# Patient Record
Sex: FEMALE | Race: WHITE | NOT HISPANIC OR LATINO | Employment: OTHER | URBAN - METROPOLITAN AREA
[De-identification: names, ages, dates, MRNs, and addresses within clinical notes are randomized per-mention and may not be internally consistent; named-entity substitution may affect disease eponyms.]

---

## 2017-02-13 ENCOUNTER — OFFICE VISIT (OUTPATIENT)
Dept: CARDIOLOGY | Facility: CLINIC | Age: 57
End: 2017-02-13

## 2017-02-13 VITALS
HEIGHT: 69 IN | SYSTOLIC BLOOD PRESSURE: 164 MMHG | HEART RATE: 84 BPM | BODY MASS INDEX: 34.36 KG/M2 | WEIGHT: 232 LBS | DIASTOLIC BLOOD PRESSURE: 98 MMHG | RESPIRATION RATE: 18 BRPM

## 2017-02-13 DIAGNOSIS — E78.5 HYPERLIPIDEMIA, UNSPECIFIED HYPERLIPIDEMIA TYPE: ICD-10-CM

## 2017-02-13 DIAGNOSIS — I10 ESSENTIAL HYPERTENSION: ICD-10-CM

## 2017-02-13 DIAGNOSIS — I48.21 PERMANENT ATRIAL FIBRILLATION (HCC): Primary | ICD-10-CM

## 2017-02-13 PROCEDURE — 99204 OFFICE O/P NEW MOD 45 MIN: CPT | Performed by: INTERNAL MEDICINE

## 2017-02-13 PROCEDURE — 93000 ELECTROCARDIOGRAM COMPLETE: CPT | Performed by: INTERNAL MEDICINE

## 2017-02-13 RX ORDER — ATORVASTATIN CALCIUM 20 MG/1
20 TABLET, FILM COATED ORAL DAILY
COMMUNITY
End: 2017-11-07 | Stop reason: SDUPTHER

## 2017-02-13 RX ORDER — METOPROLOL SUCCINATE 50 MG/1
50 TABLET, EXTENDED RELEASE ORAL NIGHTLY
Qty: 90 TABLET | Refills: 3 | Status: SHIPPED | OUTPATIENT
Start: 2017-02-13 | End: 2017-05-12

## 2017-02-13 RX ORDER — FLUTICASONE PROPIONATE 50 MCG
2 SPRAY, SUSPENSION (ML) NASAL DAILY
COMMUNITY
End: 2017-07-03 | Stop reason: SDUPTHER

## 2017-02-13 RX ORDER — ERGOCALCIFEROL 1.25 MG/1
50000 CAPSULE ORAL WEEKLY
COMMUNITY
End: 2018-12-10

## 2017-02-13 RX ORDER — FEXOFENADINE HCL 180 MG/1
180 TABLET ORAL DAILY
COMMUNITY
End: 2017-11-20 | Stop reason: ALTCHOICE

## 2017-02-13 NOTE — PROGRESS NOTES
Date of Office Visit: 2017  Encounter Provider: Mayte Vu MD  Place of Service: [facility] CARDIOLOGY  Patient Name: Claudia Gage  :1960      Patient ID:  Claudia Gage is a 56 y.o. female is here for atrial fibrillation.           History of Present Illness     She is here today to establish care.      She has a history of hypertension and hyperlipidemia and is on treatment for this.  She also has obstructive sleep apnea for which she uses a CPAP.  She has recurrent ear infections in the right ear but the other issue she has is some paroxysmal atrial fibrillation.  She is in atrial fibrillation today.  She is not on any blood thinner.      She used to follow with Dr. Gonzalez.  She had an echocardiogram done there in 2015, which showed ejection fraction of 55% with mild left atrial dilation and normal cardiac follow-up.      She is not exercising and she has gained weight.  She has followed with Dr. Briceno for her medical treatment.      Normally, her blood pressure is better controlled than it is today.  She has had kind of a rough day and so it is high.      She is noted to have a left thyroid nodule.      She has never had heart failure, diabetes, coronary artery disease, myocardial infarction, or rheumatic fever.  She does not smoke.  She is  and works part-time and has four children who are grown.  She has alcohol about once or twice per year.  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      Her mother  with coronary artery disease and she had hypertension and diabetes mellitus.          Past Medical History   Diagnosis Date   • Hyperlipidemia    • Hypertension    • Left thyroid nodule 2016   • Sleep apnea          Past Surgical History   Procedure Laterality Date   •  section     • Appendectomy         Current Outpatient Prescriptions on File Prior to Visit   Medication Sig Dispense Refill   • aspirin 81 MG EC tablet  Take 81 mg by mouth daily.     • losartan-hydrochlorothiazide (HYZAAR) 50-12.5 MG per tablet Take 1 tablet by mouth daily. 90 tablet 2   • sotalol (BETAPACE) 120 MG tablet Take 120 mg by mouth 2 (Two) Times a Day.     • [DISCONTINUED] atorvastatin (LIPITOR) 10 MG tablet      • [DISCONTINUED] ciprofloxacin (CIPRO) 500 MG tablet Take 1 tablet by mouth 2 (two) times a day. 20 tablet 0   • [DISCONTINUED] lisinopril (PRINIVIL,ZESTRIL) 20 MG tablet Take 1 tablet by mouth Daily. 90 tablet 1     Current Facility-Administered Medications on File Prior to Visit   Medication Dose Route Frequency Provider Last Rate Last Dose   • [DISCONTINUED] neomycin-polymyxin-hydrocortisone (CORTISPORIN) 1 % otic solution solution 4 drop  4 drop Both Ears 4x Daily Patrice Briceno MD           Social History     Social History   • Marital status:      Spouse name: N/A   • Number of children: N/A   • Years of education: N/A     Occupational History   • Not on file.     Social History Main Topics   • Smoking status: Never Smoker   • Smokeless tobacco: Never Used   • Alcohol use Yes      Comment: 1-2 yearly   • Drug use: No   • Sexual activity: No     Other Topics Concern   • Not on file     Social History Narrative           Review of Systems   Constitution: Negative.   HENT: Negative for congestion and headaches.    Eyes: Negative for vision loss in left eye and vision loss in right eye.   Respiratory: Negative.  Negative for cough, hemoptysis, shortness of breath, sleep disturbances due to breathing, snoring, sputum production and wheezing.    Endocrine: Negative.    Hematologic/Lymphatic: Negative.    Skin: Negative for poor wound healing and rash.   Musculoskeletal: Negative for falls, gout, muscle cramps and myalgias.   Gastrointestinal: Negative for abdominal pain, diarrhea, dysphagia, hematemesis, melena, nausea and vomiting.   Neurological: Negative for excessive daytime sleepiness, dizziness, light-headedness, loss of balance,  "seizures and vertigo.   Psychiatric/Behavioral: Negative for depression and substance abuse. The patient is not nervous/anxious.        Procedures    ECG 12 Lead  Date/Time: 2/13/2017 3:01 PM  Performed by: RASHAUN JOYCE  Authorized by: RASHAUN JOYCE   Comparison: not compared with previous ECG   Rhythm: atrial fibrillation  Clinical impression: abnormal ECG               Objective:      Vitals:    02/13/17 1435 02/13/17 1438   BP: 158/100 164/98   BP Location: Right arm Left arm   Pulse: 84    Resp: 18    Weight: 232 lb (105 kg)    Height: 68.5\" (174 cm)      Body mass index is 34.76 kg/(m^2).    Physical Exam   Constitutional: She is oriented to person, place, and time. She appears well-developed and well-nourished. No distress.   HENT:   Head: Normocephalic and atraumatic.   Eyes: Conjunctivae are normal. No scleral icterus.   Neck: Neck supple. No JVD present. Carotid bruit is not present. No thyromegaly present.   Cardiovascular: Normal rate, S1 normal, S2 normal, normal heart sounds and intact distal pulses.  An irregularly irregular rhythm present.  No extrasystoles are present. PMI is not displaced.  Exam reveals no gallop.    No murmur heard.  Pulses:       Carotid pulses are 2+ on the right side, and 2+ on the left side.       Radial pulses are 2+ on the right side, and 2+ on the left side.        Dorsalis pedis pulses are 2+ on the right side, and 2+ on the left side.        Posterior tibial pulses are 2+ on the right side, and 2+ on the left side.   Pulmonary/Chest: Effort normal and breath sounds normal. No respiratory distress. She has no wheezes. She has no rhonchi. She has no rales. She exhibits no tenderness.   Abdominal: Soft. Bowel sounds are normal. She exhibits no distension, no abdominal bruit and no mass. There is no tenderness.   Musculoskeletal: She exhibits no edema or deformity.   Lymphadenopathy:     She has no cervical adenopathy.   Neurological: She is alert and oriented " to person, place, and time. No cranial nerve deficit.   Skin: Skin is warm and dry. No rash noted. She is not diaphoretic. No cyanosis. No pallor. Nails show no clubbing.   Psychiatric: She has a normal mood and affect. Judgment normal.   Vitals reviewed.      Lab Review:       Assessment:      Diagnosis Plan   1. Essential hypertension     2. Hyperlipidemia, unspecified hyperlipidemia type        1. Paroxysmal atrial fibrillation.  She is currently in atrial fibrillation.  We will start her on Xarelto 20 mg daily, and discontinue the Sotalol and start her on Toprol XL 50 mg q hs.     2. Hypertension.  We will use Toprol for her hypertension.     3. Hyperlipidemia.   On Atorvastatin.,     4. Obstructive sleep apnea. On CPAP.     5. Recurrent right ear infections.      6. Obesity and sedentary lifestyle.     7. Family history of coronary artery of coronary artery disease in her mother.            Plan:       See back in 3 months.    Atrial Fibrillation and Atrial Flutter  Assessment  • The patient has paroxysmal atrial fibrillation  • This is non-valvular in etiology  • The patient's CHADS2-VASc score is 2  • A [id number] score of 2 or more is considered a high risk for a thromboembolic event  • Rivaroxaban prescribed    Plan  • Continue in atrial fibrillation with rate control  • Add rivaroxaban for antithrombotic therapy  • Continue beta blocker for rate control

## 2017-05-04 DIAGNOSIS — I10 ESSENTIAL HYPERTENSION: ICD-10-CM

## 2017-05-04 RX ORDER — LOSARTAN POTASSIUM AND HYDROCHLOROTHIAZIDE 12.5; 5 MG/1; MG/1
TABLET ORAL
Qty: 90 TABLET | Refills: 1 | Status: SHIPPED | OUTPATIENT
Start: 2017-05-04 | End: 2017-10-11 | Stop reason: SDUPTHER

## 2017-05-12 ENCOUNTER — OFFICE VISIT (OUTPATIENT)
Dept: CARDIOLOGY | Facility: CLINIC | Age: 57
End: 2017-05-12

## 2017-05-12 VITALS
SYSTOLIC BLOOD PRESSURE: 140 MMHG | BODY MASS INDEX: 34.56 KG/M2 | HEART RATE: 117 BPM | HEIGHT: 68 IN | DIASTOLIC BLOOD PRESSURE: 80 MMHG | WEIGHT: 228 LBS

## 2017-05-12 DIAGNOSIS — E78.5 HYPERLIPIDEMIA, UNSPECIFIED HYPERLIPIDEMIA TYPE: ICD-10-CM

## 2017-05-12 DIAGNOSIS — I48.21 PERMANENT ATRIAL FIBRILLATION (HCC): Primary | ICD-10-CM

## 2017-05-12 DIAGNOSIS — I10 ESSENTIAL HYPERTENSION: ICD-10-CM

## 2017-05-12 PROCEDURE — 99214 OFFICE O/P EST MOD 30 MIN: CPT | Performed by: INTERNAL MEDICINE

## 2017-05-12 PROCEDURE — 93000 ELECTROCARDIOGRAM COMPLETE: CPT | Performed by: INTERNAL MEDICINE

## 2017-05-12 RX ORDER — METOPROLOL SUCCINATE 100 MG/1
100 TABLET, EXTENDED RELEASE ORAL NIGHTLY
Qty: 90 TABLET | Refills: 3 | Status: SHIPPED | OUTPATIENT
Start: 2017-05-12 | End: 2017-11-20

## 2017-06-20 RX ORDER — CLOBETASOL PROPIONATE 0.5 MG/G
CREAM TOPICAL
Qty: 60 G | Refills: 1 | Status: SHIPPED | OUTPATIENT
Start: 2017-06-20 | End: 2020-03-10 | Stop reason: SDUPTHER

## 2017-07-03 RX ORDER — FLUTICASONE PROPIONATE 50 MCG
2 SPRAY, SUSPENSION (ML) NASAL DAILY
Qty: 1 EACH | Refills: 11 | Status: SHIPPED | OUTPATIENT
Start: 2017-07-03

## 2017-09-06 ENCOUNTER — OFFICE VISIT (OUTPATIENT)
Dept: INTERNAL MEDICINE | Facility: CLINIC | Age: 57
End: 2017-09-06

## 2017-09-06 VITALS
HEIGHT: 68 IN | DIASTOLIC BLOOD PRESSURE: 80 MMHG | RESPIRATION RATE: 18 BRPM | OXYGEN SATURATION: 99 % | WEIGHT: 228 LBS | SYSTOLIC BLOOD PRESSURE: 150 MMHG | BODY MASS INDEX: 34.56 KG/M2 | HEART RATE: 74 BPM

## 2017-09-06 DIAGNOSIS — I10 ESSENTIAL HYPERTENSION: ICD-10-CM

## 2017-09-06 DIAGNOSIS — H92.02 OTALGIA, LEFT EAR: Primary | ICD-10-CM

## 2017-09-06 PROBLEM — H60.8X3 CHRONIC REACTIVE OTITIS EXTERNA OF BOTH EARS: Status: ACTIVE | Noted: 2017-09-06

## 2017-09-06 PROCEDURE — 99213 OFFICE O/P EST LOW 20 MIN: CPT | Performed by: INTERNAL MEDICINE

## 2017-09-06 RX ORDER — NEOMYCIN SULFATE, POLYMYXIN B SULFATE, HYDROCORTISONE 3.5; 10000; 1 MG/ML; [USP'U]/ML; MG/ML
4 SOLUTION/ DROPS AURICULAR (OTIC) 4 TIMES DAILY
Status: DISCONTINUED | OUTPATIENT
Start: 2017-09-06 | End: 2019-10-15

## 2017-09-06 NOTE — PROGRESS NOTES
Subjective   Claudia Gage is a 57 y.o. female.     History of Present Illness   56 yo female with L otalgia.  She has HTN, HL, Atrial fibrilllation.  She was previously on sotalol.  Now on toprol xl. HTN well contributed. Now seeing Dr. Vu.  She is doing well from CV standpoint.  At home BP is running good. She has not had well exam in a while.        Here for acute Left ear pain starting yesterday after shampoo and conditioner.  She denies fever, but having white debris.  No fever or chills.      No current cp.  Overdue for her routine care.     The following portions of the patient's history were reviewed and updated as appropriate: allergies, current medications, past family history, past medical history, past social history, past surgical history and problem list.    Review of Systems   Constitutional: Negative for appetite change, fatigue, fever and unexpected weight change.   HENT: Positive for ear discharge and ear pain. Negative for congestion, facial swelling, sinus pressure and trouble swallowing.    Respiratory: Negative.  Negative for cough and chest tightness.    Cardiovascular: Negative.  Negative for chest pain, palpitations and leg swelling.   Gastrointestinal: Negative.  Negative for constipation and diarrhea.   Genitourinary: Negative.  Negative for dysuria, frequency, hematuria, pelvic pain and vaginal discharge.   Musculoskeletal: Negative.    Skin: Negative.    Neurological: Negative for dizziness, light-headedness and headaches.   Hematological: Negative.    Psychiatric/Behavioral: Negative.    All other systems reviewed and are negative.      Objective   Physical Exam   Constitutional: She is oriented to person, place, and time. She appears well-developed and well-nourished.   HENT:   Head: Normocephalic and atraumatic.   Right Ear: External ear normal.   Left Ear: External ear normal.   Eyes: EOM are normal. Pupils are equal, round, and reactive to light.   Neck: Normal range of motion.  Neck supple. No JVD present. No tracheal deviation present. No thyromegaly present.   Cardiovascular: Normal rate, regular rhythm and normal heart sounds.  Exam reveals no friction rub.    No murmur heard.  Pulmonary/Chest: Effort normal and breath sounds normal.   Lymphadenopathy:     She has no cervical adenopathy.   Neurological: She is alert and oriented to person, place, and time.   Skin: Skin is warm and dry.   Psychiatric: She has a normal mood and affect. Her behavior is normal.   Vitals reviewed.      Assessment/Plan   Claudia was seen today for ear fullness.    Diagnoses and all orders for this visit:    Otalgia, left ear  -     neomycin-polymyxin-hydrocortisone (CORTISPORIN) 1 % otic solution solution 4 drop; Administer 4 drops into both ears 4 (Four) Times a Day.    Essential hypertension          Wear plugs in the shower  Reiterate eustachian tube dysfunction  Call or return if not better, may need ENT input if continues.

## 2017-09-07 NOTE — TELEPHONE ENCOUNTER
Rx was not sent at time of visit.  Called and left on Tami's VM.  LM on patient's VM advising.    ----- Message from Alycia Schaefer sent at 9/7/2017  3:54 PM EDT -----  Regarding: REFILL  Contact: [phone]  Pt was seen 9/6/17 and was prescribed cortisporin 1% otic solution 4 drops but pharmacy has not received script. Please fax to [facility] [phone]

## 2017-10-11 DIAGNOSIS — I10 ESSENTIAL HYPERTENSION: ICD-10-CM

## 2017-10-11 RX ORDER — LOSARTAN POTASSIUM AND HYDROCHLOROTHIAZIDE 12.5; 5 MG/1; MG/1
TABLET ORAL
Qty: 90 TABLET | Refills: 0 | Status: SHIPPED | OUTPATIENT
Start: 2017-10-11 | End: 2018-01-03 | Stop reason: SDUPTHER

## 2017-11-07 ENCOUNTER — OFFICE VISIT (OUTPATIENT)
Dept: INTERNAL MEDICINE | Facility: CLINIC | Age: 57
End: 2017-11-07

## 2017-11-07 VITALS
OXYGEN SATURATION: 97 % | SYSTOLIC BLOOD PRESSURE: 140 MMHG | BODY MASS INDEX: 34.4 KG/M2 | RESPIRATION RATE: 18 BRPM | HEART RATE: 80 BPM | HEIGHT: 68 IN | DIASTOLIC BLOOD PRESSURE: 78 MMHG | WEIGHT: 227 LBS

## 2017-11-07 DIAGNOSIS — E78.5 HYPERLIPIDEMIA, UNSPECIFIED HYPERLIPIDEMIA TYPE: ICD-10-CM

## 2017-11-07 DIAGNOSIS — Z00.00 ROUTINE ADULT HEALTH MAINTENANCE: Primary | ICD-10-CM

## 2017-11-07 LAB
ALBUMIN SERPL-MCNC: 4.2 G/DL (ref 3.5–5.2)
ALBUMIN/GLOB SERPL: 1.3 G/DL
ALP SERPL-CCNC: 69 U/L (ref 40–129)
ALT SERPL-CCNC: 20 U/L (ref 5–33)
AST SERPL-CCNC: 18 U/L (ref 5–32)
BASOPHILS # BLD AUTO: 0.04 10*3/MM3 (ref 0–0.2)
BASOPHILS NFR BLD AUTO: 0.7 % (ref 0–2)
BILIRUB BLD-MCNC: NEGATIVE MG/DL
BILIRUB SERPL-MCNC: 0.9 MG/DL (ref 0.2–1.2)
BUN SERPL-MCNC: 16 MG/DL (ref 6–20)
BUN/CREAT SERPL: 20.8 (ref 7–25)
CALCIUM SERPL-MCNC: 9.6 MG/DL (ref 8.6–10.5)
CHLORIDE SERPL-SCNC: 100 MMOL/L (ref 98–107)
CHOLEST SERPL-MCNC: 216 MG/DL (ref 0–200)
CLARITY, POC: CLEAR
CO2 SERPL-SCNC: 28.7 MMOL/L (ref 22–29)
COLOR UR: YELLOW
CREAT SERPL-MCNC: 0.77 MG/DL (ref 0.57–1)
EOSINOPHIL # BLD AUTO: 0.11 10*3/MM3 (ref 0.1–0.3)
EOSINOPHIL NFR BLD AUTO: 2 % (ref 0–4)
ERYTHROCYTE [DISTWIDTH] IN BLOOD BY AUTOMATED COUNT: 13.2 % (ref 11.5–14.5)
GFR SERPLBLD CREATININE-BSD FMLA CKD-EPI: 77 ML/MIN/1.73
GFR SERPLBLD CREATININE-BSD FMLA CKD-EPI: 94 ML/MIN/1.73
GLOBULIN SER CALC-MCNC: 3.2 GM/DL
GLUCOSE SERPL-MCNC: 92 MG/DL (ref 65–99)
GLUCOSE UR STRIP-MCNC: NEGATIVE MG/DL
HBA1C MFR BLD: 6.3 % (ref 4.8–5.6)
HCT VFR BLD AUTO: 41.7 % (ref 37–47)
HDLC SERPL-MCNC: 47 MG/DL (ref 40–60)
HGB BLD-MCNC: 13.7 G/DL (ref 12–16)
IMM GRANULOCYTES # BLD: 0.01 10*3/MM3 (ref 0–0.03)
IMM GRANULOCYTES NFR BLD: 0.2 % (ref 0–0.5)
KETONES UR QL: NEGATIVE
LDLC SERPL CALC-MCNC: 143 MG/DL (ref 0–100)
LDLC/HDLC SERPL: 3.04 {RATIO}
LEUKOCYTE EST, POC: NEGATIVE
LYMPHOCYTES # BLD AUTO: 1.82 10*3/MM3 (ref 0.6–4.8)
LYMPHOCYTES NFR BLD AUTO: 32.6 % (ref 20–45)
MCH RBC QN AUTO: 28.4 PG (ref 27–31)
MCHC RBC AUTO-ENTMCNC: 32.9 G/DL (ref 31–37)
MCV RBC AUTO: 86.5 FL (ref 81–99)
MONOCYTES # BLD AUTO: 0.52 10*3/MM3 (ref 0–1)
MONOCYTES NFR BLD AUTO: 9.3 % (ref 3–8)
NEUTROPHILS # BLD AUTO: 3.09 10*3/MM3 (ref 1.5–8.3)
NEUTROPHILS NFR BLD AUTO: 55.2 % (ref 45–70)
NITRITE UR-MCNC: NEGATIVE MG/ML
NRBC BLD AUTO-RTO: 0 /100 WBC (ref 0–0)
PH UR: 5.5 [PH] (ref 5–8)
PLATELET # BLD AUTO: 302 10*3/MM3 (ref 140–500)
POTASSIUM SERPL-SCNC: 4.1 MMOL/L (ref 3.5–5.2)
PROT SERPL-MCNC: 7.4 G/DL (ref 6–8.5)
PROT UR STRIP-MCNC: NEGATIVE MG/DL
RBC # BLD AUTO: 4.82 10*6/MM3 (ref 4.2–5.4)
RBC # UR STRIP: ABNORMAL /UL
SODIUM SERPL-SCNC: 139 MMOL/L (ref 136–145)
SP GR UR: 1.01 (ref 1–1.03)
TRIGL SERPL-MCNC: 130 MG/DL (ref 0–150)
TSH SERPL DL<=0.005 MIU/L-ACNC: 1.31 MIU/ML (ref 0.27–4.2)
UROBILINOGEN UR QL: NORMAL
VLDLC SERPL CALC-MCNC: 26 MG/DL (ref 7–27)
WBC # BLD AUTO: 5.59 10*3/MM3 (ref 4.8–10.8)

## 2017-11-07 PROCEDURE — 99396 PREV VISIT EST AGE 40-64: CPT | Performed by: INTERNAL MEDICINE

## 2017-11-07 PROCEDURE — 81003 URINALYSIS AUTO W/O SCOPE: CPT | Performed by: INTERNAL MEDICINE

## 2017-11-07 RX ORDER — ATORVASTATIN CALCIUM 20 MG/1
20 TABLET, FILM COATED ORAL DAILY
Qty: 90 TABLET | Refills: 2 | Status: SHIPPED | OUTPATIENT
Start: 2017-11-07 | End: 2018-10-02 | Stop reason: SDUPTHER

## 2017-11-07 NOTE — PROGRESS NOTES
Melchor Gage is a 57 y.o. female.     History of Present Illness   58yo female with ppmhx HTN, HL, AF, SIMIN  She is doing well on xarelto, but twisted her ankle and is noticing it is going down.  Fell a few days ago, did have lateral swelling that is getting better.Immediately bore weight.  Using ice and elevation with some relief.    She is rate controlled, seeing dr. Vu, doing well with no cp or palpitations.  She denies any dizziness.   She is taking care of grandchildren.  She just got back from vacation, was an active time, but did not limit her diet. Twisted her ankle at the [place]. Able to bear weight.     The following portions of the patient's history were reviewed and updated as appropriate: allergies, current medications, past family history, past medical history, past social history, past surgical history and problem list.    Review of Systems   HENT: Negative.    Respiratory: Negative.    Cardiovascular: Negative.    Gastrointestinal: Negative.    Genitourinary: Negative.    Neurological: Negative.    Hematological: Negative.    Psychiatric/Behavioral: Negative.    All other systems reviewed and are negative.      Objective   Physical Exam   Constitutional: She is oriented to person, place, and time. She appears well-developed and well-nourished.   HENT:   Head: Normocephalic and atraumatic.   Right Ear: External ear normal.   Left Ear: External ear normal.   Eyes: EOM are normal. Pupils are equal, round, and reactive to light.   Neck: Normal range of motion. Neck supple. No thyromegaly present.   Cardiovascular: Normal rate, regular rhythm and normal heart sounds.    No murmur heard.  Pulmonary/Chest: Effort normal and breath sounds normal.   Abdominal: She exhibits no distension.   Neurological: She is alert and oriented to person, place, and time.   Skin: Skin is warm and dry.   Psychiatric: She has a normal mood and affect. Her behavior is normal.   Vitals  reviewed.      Assessment/Plan      Claudia was seen today for annual exam.    Diagnoses and all orders for this visit:    Routine adult health maintenance  -     Comprehensive metabolic panel  -     Lipid Panel With LDL/HDL Ratio  -     Hemoglobin A1c  -     TSH  -     CBC w AUTO Differential    Hyperlipidemia, unspecified hyperlipidemia type  -     atorvastatin (LIPITOR) 20 MG tablet; Take 1 tablet by mouth Daily.      Work on low carb lifestyle.  She is motivated to lose weight  BP is well controlled. COntinue her losartan hctz and toprol.  AF rate controlled, anticoagulated on xarelto, tolerating well.   HL - stable on lipitor, without side effects.   HM  Flu shot done at pharmacy  She is going to make her mammogram appt.  Thyroid nodule, US in 9/17 - will make appt with Dr. Amin after first of the year.    Reassurance about R ankle sprain, doubt fracture.  Ice and elevate, wrap when out walking. Avoid flip flops, wear supportive shoes.

## 2017-11-17 ENCOUNTER — TELEPHONE (OUTPATIENT)
Dept: INTERNAL MEDICINE | Facility: CLINIC | Age: 57
End: 2017-11-17

## 2017-11-17 NOTE — TELEPHONE ENCOUNTER
----- Message from Patrice Briceno MD sent at 11/15/2017  4:14 PM EST -----  Labs are great, ldl still not at goal, will increase her statin if not better in 6 months. Want her to think about HMR diet at milestone or on line.  Diabetes number controlled.    Pt given lab results info.dg

## 2017-11-20 ENCOUNTER — OFFICE VISIT (OUTPATIENT)
Dept: CARDIOLOGY | Facility: CLINIC | Age: 57
End: 2017-11-20

## 2017-11-20 VITALS
HEIGHT: 68 IN | SYSTOLIC BLOOD PRESSURE: 120 MMHG | DIASTOLIC BLOOD PRESSURE: 70 MMHG | BODY MASS INDEX: 34.56 KG/M2 | HEART RATE: 94 BPM | WEIGHT: 228 LBS

## 2017-11-20 DIAGNOSIS — I10 ESSENTIAL HYPERTENSION: Primary | ICD-10-CM

## 2017-11-20 DIAGNOSIS — E78.5 HYPERLIPIDEMIA, UNSPECIFIED HYPERLIPIDEMIA TYPE: ICD-10-CM

## 2017-11-20 DIAGNOSIS — I48.21 PERMANENT ATRIAL FIBRILLATION (HCC): ICD-10-CM

## 2017-11-20 PROCEDURE — 93000 ELECTROCARDIOGRAM COMPLETE: CPT | Performed by: INTERNAL MEDICINE

## 2017-11-20 PROCEDURE — 99214 OFFICE O/P EST MOD 30 MIN: CPT | Performed by: INTERNAL MEDICINE

## 2017-11-20 RX ORDER — METOPROLOL SUCCINATE 100 MG/1
150 TABLET, EXTENDED RELEASE ORAL NIGHTLY
Qty: 135 TABLET | Refills: 3 | Status: SHIPPED | OUTPATIENT
Start: 2017-11-20 | End: 2018-12-10

## 2017-11-20 NOTE — PROGRESS NOTES
Date of Office Visit: 2017  Encounter Provider: Mayte Vu MD  Place of Service: [facility] CARDIOLOGY  Patient Name: Claudia Gage  :1960      Patient ID:  Claudia Gage is a 57 y.o. female is here for  followup for hypertension and atrial fibrillation.         History of Present Illness    She has a history of hypertension and hyperlipidemia and is on treatment for this. She also has obstructive sleep apnea for which she uses a CPAP. She has recurrent ear infections in the right ear but the other issue she has is some paroxysmal atrial fibrillation. She is in atrial fibrillation today. She is not on any blood thinner.       She used to follow with Dr. Gonzalez. She had an echocardiogram done there in 2015, which showed ejection fraction of 55% with mild left atrial dilation and normal cardiac valves.       She is not exercising and she has gained weight. She has followed with Dr. Briceno for her medical treatment.       She is noted to have a left thyroid nodule.       She does not smoke. She is  and works part-time and has four children who are grown. She has alcohol about once or twice per year. 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.       Her mother  with coronary artery disease and she had hypertension and diabetes mellitus.       She is doing well.  Her heart rate is high here today.  She did a lot of hiking this summer.  She's had no tachycardia that she can feel but she does have dyspnea with exertion.  She's had no dizziness or syncope.  She has no chest pain or pressure.  She is tolerating her medications.  She's taking them as directed.    Past Medical History:   Diagnosis Date   • Hyperlipidemia    • Hypertension    • Left thyroid nodule 2016   • Otalgia, left ear 2017   • Routine adult health maintenance 2017   • Sleep apnea          Past Surgical History:   Procedure Laterality Date   • APPENDECTOMY     •   SECTION         Current Outpatient Prescriptions on File Prior to Visit   Medication Sig Dispense Refill   • atorvastatin (LIPITOR) 20 MG tablet Take 1 tablet by mouth Daily. 90 tablet 2   • clobetasol (TEMOVATE) 0.05 % cream APPLY TWICE DAILY AS DIRECTED 60 g 1   • fluticasone (FLONASE) 50 MCG/ACT nasal spray 2 sprays into each nostril Daily. 1 each 11   • losartan-hydrochlorothiazide (HYZAAR) 50-12.5 MG per tablet TAKE ONE TABLET BY MOUTH DAILY 90 tablet 0   • metoprolol succinate XL (TOPROL-XL) 100 MG 24 hr tablet Take 1 tablet by mouth Every Night. 90 tablet 3   • Nutritional Supplements (ESTROVEN PO) Take  by mouth Daily.     • rivaroxaban (XARELTO) 20 MG tablet Take 1 tablet by mouth Daily. 90 tablet 3   • vitamin D (ERGOCALCIFEROL) 46550 UNITS capsule capsule Take 50,000 Units by mouth 1 (One) Time Per Week.     • [DISCONTINUED] aspirin 81 MG EC tablet Take 81 mg by mouth daily.     • [DISCONTINUED] fexofenadine (ALLEGRA) 180 MG tablet Take 180 mg by mouth Daily.       Current Facility-Administered Medications on File Prior to Visit   Medication Dose Route Frequency Provider Last Rate Last Dose   • neomycin-polymyxin-hydrocortisone (CORTISPORIN) 1 % otic solution solution 4 drop  4 drop Both Ears 4x Daily Patrice Briceno MD           Social History     Social History   • Marital status:      Spouse name: N/A   • Number of children: N/A   • Years of education: N/A     Occupational History   • Not on file.     Social History Main Topics   • Smoking status: Never Smoker   • Smokeless tobacco: Never Used   • Alcohol use Yes      Comment: 1-2 yearly   • Drug use: No   • Sexual activity: No     Other Topics Concern   • Not on file     Social History Narrative           Review of Systems   Constitution: Negative.   HENT: Negative for congestion.    Eyes: Negative for vision loss in left eye and vision loss in right eye.   Respiratory: Negative.  Negative for cough, hemoptysis, shortness of breath,  "sleep disturbances due to breathing, snoring, sputum production and wheezing.    Endocrine: Negative.    Hematologic/Lymphatic: Negative.    Skin: Negative for poor wound healing and rash.   Musculoskeletal: Negative for falls, gout, muscle cramps and myalgias.   Gastrointestinal: Negative for abdominal pain, diarrhea, dysphagia, hematemesis, melena, nausea and vomiting.   Neurological: Negative for excessive daytime sleepiness, dizziness, headaches, light-headedness, loss of balance, seizures and vertigo.   Psychiatric/Behavioral: Negative for depression and substance abuse. The patient is not nervous/anxious.        Procedures    ECG 12 Lead  Date/Time: 11/20/2017 2:21 PM  Performed by: RASHAUN JOYCE  Authorized by: RASHAUN JOYCE   Comparison: compared with previous ECG   Similar to previous ECG  Rhythm: atrial fibrillation  T flattening: all  Clinical impression: abnormal ECG               Objective:      Vitals:    11/20/17 1415   BP: 120/70   BP Location: Right arm   Patient Position: Sitting   Pulse: 94   Weight: 228 lb (103 kg)   Height: 68\" (172.7 cm)     Body mass index is 34.67 kg/(m^2).    Physical Exam   Constitutional: She is oriented to person, place, and time. She appears well-developed and well-nourished. No distress.   HENT:   Head: Normocephalic and atraumatic.   Eyes: Conjunctivae are normal. No scleral icterus.   Neck: Neck supple. No JVD present. Carotid bruit is not present. No thyromegaly present.   Cardiovascular: Normal rate, S1 normal, S2 normal, normal heart sounds and intact distal pulses.  An irregularly irregular rhythm present.  No extrasystoles are present. PMI is not displaced.  Exam reveals no gallop.    No murmur heard.  Pulses:       Carotid pulses are 2+ on the right side, and 2+ on the left side.       Radial pulses are 2+ on the right side, and 2+ on the left side.        Dorsalis pedis pulses are 2+ on the right side, and 2+ on the left side.        Posterior " tibial pulses are 2+ on the right side, and 2+ on the left side.   Pulmonary/Chest: Effort normal and breath sounds normal. No respiratory distress. She has no wheezes. She has no rhonchi. She has no rales. She exhibits no tenderness.   Abdominal: Soft. Bowel sounds are normal. She exhibits no distension, no abdominal bruit and no mass. There is no tenderness.   Musculoskeletal: She exhibits no edema or deformity.   Lymphadenopathy:     She has no cervical adenopathy.   Neurological: She is alert and oriented to person, place, and time. No cranial nerve deficit.   Skin: Skin is warm and dry. No rash noted. She is not diaphoretic. No cyanosis. No pallor. Nails show no clubbing.   Psychiatric: She has a normal mood and affect. Judgment normal.   Vitals reviewed.      Lab Review:       Assessment:      Diagnosis Plan   1. Essential hypertension     2. Permanent atrial fibrillation     3. Hyperlipidemia, unspecified hyperlipidemia type       1. Paroxysmal atrial fibrillation, on Xarelto 20 mg daily, and increase Toprol  mg q hs.   2. Hypertension. We will use Toprol for her hypertension.   3. Hyperlipidemia. On Atorvastatin.,   4. Obstructive sleep apnea. On CPAP.   5. Recurrent right ear infections.   6. Obesity and sedentary lifestyle.   7. Family history of coronary artery of coronary artery disease in her mother.          Plan:       See back in 6 months. Heart rate high so increase meds.    Atrial Fibrillation and Atrial Flutter  Assessment  • The patient has permanent atrial fibrillation  • This is non-valvular in etiology  • The patient's CHADS2-VASc score is 2  • A [id number] score of 2 or more is considered a high risk for a thromboembolic event  • Rivaroxaban prescribed    Plan  • Continue in atrial fibrillation with rate control  • Continue rivaroxaban for antithrombotic therapy, bleeding issues discussed  • Continue beta blocker for rate control

## 2018-01-03 DIAGNOSIS — I10 ESSENTIAL HYPERTENSION: ICD-10-CM

## 2018-01-04 RX ORDER — LOSARTAN POTASSIUM AND HYDROCHLOROTHIAZIDE 12.5; 5 MG/1; MG/1
TABLET ORAL
Qty: 90 TABLET | Refills: 0 | Status: SHIPPED | OUTPATIENT
Start: 2018-01-04 | End: 2018-04-02 | Stop reason: SDUPTHER

## 2018-01-04 RX ORDER — RIVAROXABAN 20 MG/1
TABLET, FILM COATED ORAL
Qty: 90 TABLET | Refills: 2 | Status: SHIPPED | OUTPATIENT
Start: 2018-01-04 | End: 2018-10-17 | Stop reason: SDUPTHER

## 2018-01-09 ENCOUNTER — OFFICE VISIT (OUTPATIENT)
Dept: SLEEP MEDICINE | Facility: HOSPITAL | Age: 58
End: 2018-01-09
Attending: INTERNAL MEDICINE

## 2018-01-09 VITALS
HEART RATE: 73 BPM | WEIGHT: 230 LBS | DIASTOLIC BLOOD PRESSURE: 76 MMHG | SYSTOLIC BLOOD PRESSURE: 124 MMHG | HEIGHT: 67 IN | BODY MASS INDEX: 36.1 KG/M2

## 2018-01-09 DIAGNOSIS — G47.33 OBSTRUCTIVE SLEEP APNEA: Primary | ICD-10-CM

## 2018-01-09 DIAGNOSIS — E66.9 OBESITY (BMI 30-39.9): ICD-10-CM

## 2018-01-09 PROCEDURE — G0463 HOSPITAL OUTPT CLINIC VISIT: HCPCS

## 2018-01-09 NOTE — PROGRESS NOTES
[facility] SLEEP MEDICINE  [address]  3rd Floor  [facility] [address]  [phone]    PCP: Patrice Briceno MD    Reason for visit:  Sleep disorders: SIMIN    Claudia Gage is a 57 y.o.female who was seen in the Sleep Disorders Center today. Last seen Jan 2016. She had mild SIMIN (RDI criteria only) and setup with CPAP. She was doing well with it on last check. She feels the device is very helpful. Her compliance is 94%. Using nasal mask, changes regularly. Sleeps from 12MN to 8AM. Wakes up rested in AM. No EDS. ESS is 9.    Current Medications:    Current Outpatient Prescriptions:   •  atorvastatin (LIPITOR) 20 MG tablet, Take 1 tablet by mouth Daily., Disp: 90 tablet, Rfl: 2  •  clobetasol (TEMOVATE) 0.05 % cream, APPLY TWICE DAILY AS DIRECTED, Disp: 60 g, Rfl: 1  •  fluticasone (FLONASE) 50 MCG/ACT nasal spray, 2 sprays into each nostril Daily., Disp: 1 each, Rfl: 11  •  Loratadine (CLARITIN PO), Take 1 tablet by mouth Daily., Disp: , Rfl:   •  losartan-hydrochlorothiazide (HYZAAR) 50-12.5 MG per tablet, TAKE ONE TABLET BY MOUTH DAILY, Disp: 90 tablet, Rfl: 0  •  metoprolol succinate XL (TOPROL-XL) 100 MG 24 hr tablet, Take 1.5 tablets by mouth Every Night., Disp: 135 tablet, Rfl: 3  •  Nutritional Supplements (ESTROVEN PO), Take  by mouth Daily., Disp: , Rfl:   •  vitamin D (ERGOCALCIFEROL) 35024 UNITS capsule capsule, Take 50,000 Units by mouth 1 (One) Time Per Week., Disp: , Rfl:   •  XARELTO 20 MG tablet, TAKE 1 TABLET EVERY DAY, Disp: 90 tablet, Rfl: 2    Current Facility-Administered Medications:   •  neomycin-polymyxin-hydrocortisone (CORTISPORIN) 1 % otic solution solution 4 drop, 4 drop, Both Ears, 4x Daily, Patrice Briceno MD   also entered in Sleep Questionnaire    Patient  has a past medical history of Hyperlipidemia; Hypertension; Left thyroid nodule (9/13/2016); Otalgia, left ear (9/6/2017); Routine adult health maintenance (11/7/2017); and Sleep apnea.   I have reviewed the Past  "Medical History, Past Surgical History, Social History and Family History in EPIC and Sleep Questionnaire.    Pertinent Positive Review of Systems of nasal congestion, ear pain  Rest of Review of Systems was negative as recorded in Sleep Questionnaire.        Vital Signs: /76  Pulse 73  Ht 170.2 cm (67\")  Wt 104 kg (230 lb)  BMI 36.02 kg/m2        General: Alert. Cooperative. Well developed. No acute distress.             Head:  Normocephalic. Symmetrical. Atraumatic.              Nose: No septal deviation. No drainage.          Throat: No oral lesions. No thrush. Moist mucous membranes.    Tongue is normal and dentition is intact       Pharynx: Posterior pharyngeal pillars are narrow with Mallampati score of Class III     Chest Wall:  Normal shape. Symmetric expansion with respiration. No tenderness.             Neck:  Trachea midline.           Lungs:  Clear to auscultation bilaterally. No wheezes. No rhonchi. No rales. Respirations regular, even and unlabored.            Heart:  Regular rhythm and normal rate. Normal S1 and S2. No murmur.     Abdomen:  Soft, non-tender and non-distended. Normal bowel sounds. No masses.  Extremities:  Moves all extremities well. No edema.    Psychiatric: Normal mood and affect.    Study:  · 10/12/15 diagnostic  Diagnostic study from 10:38 p.m. to 5:55 a.m. Sleep efficiency was  poor at 63%. Per patient's request, Ambien 5 mg administered at lights out.  Total sleep time was 4.63 hours. Despite the Ambien, sleep efficiency was poor  and patient had a very prolonged awake after 4 a.m. Sleep distribution was  normal for sleep time at 4% stage I sleep, 48% stage II sleep, 28% slow-wave  sleep and 19% REM sleep. The apnea/hypopnea index 0.9 with RDI of 6.5. In 25  minutes of supine sleep RDI 21.6 and in 53 minutes of REM sleep, RDI 5.6.  Oxygen saturation remained above 90%. Arousal index 17 and patient had 40% time  snoring.     Testing:  · 90 day compliance is 94%.  Average " usage 6 hours.  AHI 3.5.  Average CPAP pressure is 9-11 cm.  Set auto CPAP pressure is 5-15 cm.    [facility] [employer]: "[employer]"    Impression:  1. Obstructive sleep apnea    2. Obesity (BMI 30-39.9)        Plan:  Patient has only mild obstructive sleep apnea by RDI criteria for study done in 2015.  Despite this she benefits greatly from the machine.  She has underlying atrial fibrillation.  It is therefore important for her to continue to use the same.  She is benefiting from regular usage and change his supplies regularly.    I reiterated the importance of effective treatment of obstructive sleep apnea with PAP machine.  Cardiovascular health risks of untreated sleep apnea were again reviewed.  Patient was asked to remain cautious if there is persistent hypersomnolence.    Change of PAP supplies regularly is important for effective use.  Change of cushion on the mask or plugs on nasal pillows along with disposable filters once every month and change of mask frame, tubing, headgear and Velcro straps every 6 months at the minimum was reiterated.    This patient is compliant with PAP machine and benefits from its use.  Apnea hypopneas index is corrected/improved.  Daytime hypersomnolence has resolved.     Patient will follow up in this clinic in 1 year    Thank you for allowing me to participate in your patient's care.    Honorio Tavares MD  [facility] SLEEP MEDICINE  [address] [place] [address]  3rd Floor  [facility] [address]  Dept: [phone]  Dept Fax: [phone]  Loc: [phone]    Part of this note may be an electronic transcription/translation of spoken language to printed text using the Dragon Dictation System.

## 2018-04-02 DIAGNOSIS — I10 ESSENTIAL HYPERTENSION: ICD-10-CM

## 2018-04-02 RX ORDER — LOSARTAN POTASSIUM AND HYDROCHLOROTHIAZIDE 12.5; 5 MG/1; MG/1
TABLET ORAL
Qty: 90 TABLET | Refills: 0 | Status: SHIPPED | OUTPATIENT
Start: 2018-04-02 | End: 2018-04-13 | Stop reason: SDUPTHER

## 2018-04-13 DIAGNOSIS — I10 ESSENTIAL HYPERTENSION: ICD-10-CM

## 2018-04-16 RX ORDER — LOSARTAN POTASSIUM AND HYDROCHLOROTHIAZIDE 12.5; 5 MG/1; MG/1
TABLET ORAL
Qty: 90 TABLET | Refills: 0 | Status: SHIPPED | OUTPATIENT
Start: 2018-04-16 | End: 2018-10-23 | Stop reason: SDUPTHER

## 2018-10-02 DIAGNOSIS — E78.5 HYPERLIPIDEMIA, UNSPECIFIED HYPERLIPIDEMIA TYPE: ICD-10-CM

## 2018-10-02 RX ORDER — ATORVASTATIN CALCIUM 20 MG/1
TABLET, FILM COATED ORAL
Qty: 90 TABLET | Refills: 0 | Status: SHIPPED | OUTPATIENT
Start: 2018-10-02 | End: 2018-12-30 | Stop reason: SDUPTHER

## 2018-10-17 RX ORDER — RIVAROXABAN 20 MG/1
TABLET, FILM COATED ORAL
Qty: 90 TABLET | Refills: 0 | Status: SHIPPED | OUTPATIENT
Start: 2018-10-17 | End: 2019-01-14 | Stop reason: SDUPTHER

## 2018-10-23 DIAGNOSIS — I10 ESSENTIAL HYPERTENSION: ICD-10-CM

## 2018-10-23 RX ORDER — LOSARTAN POTASSIUM AND HYDROCHLOROTHIAZIDE 12.5; 5 MG/1; MG/1
TABLET ORAL
Qty: 90 TABLET | Refills: 0 | Status: SHIPPED | OUTPATIENT
Start: 2018-10-23 | End: 2019-03-05 | Stop reason: SDUPTHER

## 2018-12-10 ENCOUNTER — OFFICE VISIT (OUTPATIENT)
Dept: CARDIOLOGY | Facility: CLINIC | Age: 58
End: 2018-12-10

## 2018-12-10 VITALS
BODY MASS INDEX: 35.04 KG/M2 | SYSTOLIC BLOOD PRESSURE: 112 MMHG | DIASTOLIC BLOOD PRESSURE: 70 MMHG | WEIGHT: 231.2 LBS | HEART RATE: 63 BPM | HEIGHT: 68 IN

## 2018-12-10 DIAGNOSIS — E78.5 HYPERLIPIDEMIA, UNSPECIFIED HYPERLIPIDEMIA TYPE: ICD-10-CM

## 2018-12-10 DIAGNOSIS — I48.0 PAROXYSMAL ATRIAL FIBRILLATION (HCC): Primary | ICD-10-CM

## 2018-12-10 DIAGNOSIS — I10 ESSENTIAL HYPERTENSION: ICD-10-CM

## 2018-12-10 PROCEDURE — 99214 OFFICE O/P EST MOD 30 MIN: CPT | Performed by: INTERNAL MEDICINE

## 2018-12-10 PROCEDURE — 93000 ELECTROCARDIOGRAM COMPLETE: CPT | Performed by: INTERNAL MEDICINE

## 2018-12-10 RX ORDER — METOPROLOL SUCCINATE 100 MG/1
150 TABLET, EXTENDED RELEASE ORAL NIGHTLY
Qty: 135 TABLET | Refills: 3 | Status: SHIPPED | OUTPATIENT
Start: 2018-12-10 | End: 2019-12-16 | Stop reason: SDUPTHER

## 2018-12-10 RX ORDER — ERGOCALCIFEROL 1.25 MG/1
50000 CAPSULE ORAL WEEKLY
Qty: 10 CAPSULE | Refills: 0 | COMMUNITY
Start: 2018-12-10 | End: 2020-03-13

## 2018-12-10 NOTE — PROGRESS NOTES
Date of Office Visit: 12/10/2018  Encounter Provider: Mayte Vu MD  Place of Service: [facility] CARDIOLOGY  Patient Name: Claudia Gage  :1960      Patient ID:  Claudia Gage is a 58 y.o. female is here for  followup for PAF.         History of Present Illness    She has a history of hypertension and hyperlipidemia and is on treatment for this. She also has obstructive sleep apnea for which she uses a CPAP. She has recurrent ear infections in the right ear but the other issue she has is some paroxysmal atrial fibrillation. She is in atrial fibrillation today. She is not on any blood thinner.       She used to follow with Dr. Gonzalez. She had an echocardiogram done there in 2015, which showed ejection fraction of 55% with mild left atrial dilation and normal cardiac valves.       She is not exercising and she has gained weight. She has followed with Dr. Briceno for her medical treatment.       She is noted to have a left thyroid nodule.       She does not smoke. She is  and works part-time and has four children who are grown. She has alcohol about once or twice per year. 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       Her mother  with coronary artery disease and she had hypertension and diabetes mellitus.     She is doing well.  She doesn't feel her heart racing or skipping.  She has no chest pain or pressure.  She has no exertional dyspnea.  She has not lost any weight.  She is taking her medications as directed.  Overall, things are stable.    Past Medical History:   Diagnosis Date   • Hyperlipidemia    • Hypertension    • Left thyroid nodule 2016   • Otalgia, left ear 2017   • Routine adult health maintenance 2017   • Sleep apnea          Past Surgical History:   Procedure Laterality Date   • APPENDECTOMY     •  SECTION         Current Outpatient Medications on File Prior to Visit   Medication Sig Dispense Refill   •  atorvastatin (LIPITOR) 20 MG tablet TAKE 1 TABLET BY MOUTH ONE TIME A DAY  90 tablet 0   • clobetasol (TEMOVATE) 0.05 % cream APPLY TWICE DAILY AS DIRECTED 60 g 1   • fluticasone (FLONASE) 50 MCG/ACT nasal spray 2 sprays into each nostril Daily. 1 each 11   • Loratadine (CLARITIN PO) Take 1 tablet by mouth Daily.     • losartan-hydrochlorothiazide (HYZAAR) 50-12.5 MG per tablet TAKE ONE TABLET BY MOUTH DAILY 90 tablet 0   • vitamin D (ERGOCALCIFEROL) 16676 units capsule capsule Take 1 capsule by mouth 1 (One) Time Per Week. 10 capsule 0   • XARELTO 20 MG tablet TAKE 1 TABLET EVERY DAY 90 tablet 0   • [DISCONTINUED] metoprolol succinate XL (TOPROL-XL) 100 MG 24 hr tablet Take 1.5 tablets by mouth Every Night. 135 tablet 3   • [DISCONTINUED] vitamin D (ERGOCALCIFEROL) 33811 UNITS capsule capsule Take 50,000 Units by mouth 1 (One) Time Per Week.     • [DISCONTINUED] Nutritional Supplements (ESTROVEN PO) Take  by mouth Daily.       Current Facility-Administered Medications on File Prior to Visit   Medication Dose Route Frequency Provider Last Rate Last Dose   • neomycin-polymyxin-hydrocortisone (CORTISPORIN) 1 % otic solution solution 4 drop  4 drop Both Ears 4x Daily Patrice Briceno MD           Social History     Socioeconomic History   • Marital status:      Spouse name: Not on file   • Number of children: Not on file   • Years of education: Not on file   • Highest education level: Not on file   Social Needs   • Financial resource strain: Not on file   • Food insecurity - worry: Not on file   • Food insecurity - inability: Not on file   • Transportation needs - medical: Not on file   • Transportation needs - non-medical: Not on file   Occupational History   • Not on file   Tobacco Use   • Smoking status: Never Smoker   • Smokeless tobacco: Never Used   Substance and Sexual Activity   • Alcohol use: Yes     Comment: 1-2 yearly   • Drug use: No   • Sexual activity: No   Other Topics Concern   • Not on file  "  Social History Narrative   • Not on file           Review of Systems   Constitution: Negative.   HENT: Negative for congestion.    Eyes: Negative for vision loss in left eye and vision loss in right eye.   Respiratory: Negative.  Negative for cough, hemoptysis, shortness of breath, sleep disturbances due to breathing, snoring, sputum production and wheezing.    Endocrine: Negative.    Hematologic/Lymphatic: Negative.    Skin: Negative for poor wound healing and rash.   Musculoskeletal: Negative for falls, gout, muscle cramps and myalgias.   Gastrointestinal: Negative for abdominal pain, diarrhea, dysphagia, hematemesis, melena, nausea and vomiting.   Neurological: Negative for excessive daytime sleepiness, dizziness, headaches, light-headedness, loss of balance, seizures and vertigo.   Psychiatric/Behavioral: Negative for depression and substance abuse. The patient is not nervous/anxious.        Procedures    ECG 12 Lead  Date/Time: 12/10/2018 2:20 PM  Performed by: Mayte Vu MD  Authorized by: Mayte Vu MD   Comparison: compared with previous ECG   Comparison to previous ECG: No a fib  Rhythm: sinus rhythm  Clinical impression: normal ECG                Objective:      Vitals:    12/10/18 1403   BP: 112/70   BP Location: Right arm   Patient Position: Sitting   Pulse: 63   Weight: 105 kg (231 lb 3.2 oz)   Height: 172.7 cm (68\")     Body mass index is 35.15 kg/m².    Physical Exam   Constitutional: She is oriented to person, place, and time. She appears well-developed and well-nourished. No distress.   HENT:   Head: Normocephalic and atraumatic.   Eyes: Conjunctivae are normal. No scleral icterus.   Neck: Neck supple. No JVD present. Carotid bruit is not present. No thyromegaly present.   Cardiovascular: Normal rate, regular rhythm, S1 normal, S2 normal, normal heart sounds and intact distal pulses.  No extrasystoles are present. PMI is not displaced. Exam reveals no gallop.   No murmur " heard.  Pulses:       Carotid pulses are 2+ on the right side, and 2+ on the left side.       Radial pulses are 2+ on the right side, and 2+ on the left side.        Dorsalis pedis pulses are 2+ on the right side, and 2+ on the left side.        Posterior tibial pulses are 2+ on the right side, and 2+ on the left side.   Pulmonary/Chest: Effort normal and breath sounds normal. No respiratory distress. She has no wheezes. She has no rhonchi. She has no rales. She exhibits no tenderness.   Abdominal: Soft. Bowel sounds are normal. She exhibits no distension, no abdominal bruit and no mass. There is no tenderness.   Musculoskeletal: She exhibits no edema or deformity.   Lymphadenopathy:     She has no cervical adenopathy.   Neurological: She is alert and oriented to person, place, and time. No cranial nerve deficit.   Skin: Skin is warm and dry. No rash noted. She is not diaphoretic. No cyanosis. No pallor. Nails show no clubbing.   Psychiatric: She has a normal mood and affect. Judgment normal.   Vitals reviewed.      Lab Review:       Assessment:      Diagnosis Plan   1. Paroxysmal atrial fibrillation (CMS/HCC)     2. Essential hypertension  ECG 12 Lead   3. Hyperlipidemia, unspecified hyperlipidemia type       1. Paroxysmal atrial fibrillation, on Xarelto 20 mg daily, and Toprol  mg q hs.   2. Hypertension. Controlled.   3. Hyperlipidemia. On Atorvastatin.,   4. Obstructive sleep apnea. On CPAP.   5. Recurrent right ear infections.   6. Obesity and sedentary lifestyle.   7. Family history of coronary artery of coronary artery disease in her mother.      Plan:       See back in 1 year, no chagnes.  Doing well.     Atrial Fibrillation and Atrial Flutter  Assessment  • The patient has paroxysmal atrial fibrillation  • This is non-valvular in etiology  • The patient's CHADS2-VASc score is 2  • A [id number] score of 2 or more is considered a high risk for a thromboembolic event  • Rivaroxaban  prescribed    Plan  • Attempt to maintain sinus rhythm  • Continue rivaroxaban for antithrombotic therapy, bleeding issues discussed  • Continue beta blocker for rate control

## 2018-12-26 DIAGNOSIS — E78.5 HYPERLIPIDEMIA, UNSPECIFIED HYPERLIPIDEMIA TYPE: ICD-10-CM

## 2018-12-27 RX ORDER — ATORVASTATIN CALCIUM 20 MG/1
TABLET, FILM COATED ORAL
Qty: 90 TABLET | Refills: 0 | OUTPATIENT
Start: 2018-12-27

## 2018-12-30 DIAGNOSIS — E78.5 HYPERLIPIDEMIA, UNSPECIFIED HYPERLIPIDEMIA TYPE: ICD-10-CM

## 2018-12-31 RX ORDER — ATORVASTATIN CALCIUM 20 MG/1
TABLET, FILM COATED ORAL
Qty: 90 TABLET | Refills: 0 | Status: SHIPPED | OUTPATIENT
Start: 2018-12-31 | End: 2019-03-29 | Stop reason: SDUPTHER

## 2019-01-14 RX ORDER — RIVAROXABAN 20 MG/1
TABLET, FILM COATED ORAL
Qty: 90 TABLET | Refills: 2 | Status: SHIPPED | OUTPATIENT
Start: 2019-01-14 | End: 2019-08-09 | Stop reason: SDUPTHER

## 2019-01-22 DIAGNOSIS — I10 ESSENTIAL HYPERTENSION: ICD-10-CM

## 2019-01-22 RX ORDER — LOSARTAN POTASSIUM AND HYDROCHLOROTHIAZIDE 12.5; 5 MG/1; MG/1
TABLET ORAL
Qty: 90 TABLET | Refills: 0 | OUTPATIENT
Start: 2019-01-22

## 2019-01-26 DIAGNOSIS — I10 ESSENTIAL HYPERTENSION: ICD-10-CM

## 2019-01-28 RX ORDER — LOSARTAN POTASSIUM AND HYDROCHLOROTHIAZIDE 12.5; 5 MG/1; MG/1
TABLET ORAL
Qty: 90 TABLET | Refills: 0 | OUTPATIENT
Start: 2019-01-28

## 2019-03-05 DIAGNOSIS — I10 ESSENTIAL HYPERTENSION: ICD-10-CM

## 2019-03-05 RX ORDER — LOSARTAN POTASSIUM AND HYDROCHLOROTHIAZIDE 12.5; 5 MG/1; MG/1
1 TABLET ORAL DAILY
Qty: 90 TABLET | Refills: 2 | Status: SHIPPED | OUTPATIENT
Start: 2019-03-05 | End: 2019-06-14

## 2019-03-29 DIAGNOSIS — E78.5 HYPERLIPIDEMIA, UNSPECIFIED HYPERLIPIDEMIA TYPE: ICD-10-CM

## 2019-03-29 RX ORDER — ATORVASTATIN CALCIUM 20 MG/1
TABLET, FILM COATED ORAL
Qty: 90 TABLET | Refills: 0 | Status: SHIPPED | OUTPATIENT
Start: 2019-03-29 | End: 2019-10-15 | Stop reason: SDUPTHER

## 2019-06-14 ENCOUNTER — TELEPHONE (OUTPATIENT)
Dept: CARDIOLOGY | Facility: CLINIC | Age: 59
End: 2019-06-14

## 2019-06-14 RX ORDER — VALSARTAN AND HYDROCHLOROTHIAZIDE 160; 12.5 MG/1; MG/1
1 TABLET, FILM COATED ORAL DAILY
Qty: 90 TABLET | Refills: 3 | Status: SHIPPED | OUTPATIENT
Start: 2019-06-14 | End: 2020-05-22

## 2019-06-14 NOTE — TELEPHONE ENCOUNTER
Pt called. She said that  Losartan-hydrochlorothiazide was on the recall list. She would like to know if you would send in a different Rx for her to the [facility] in [place].     Thanks

## 2019-06-27 DIAGNOSIS — E78.5 HYPERLIPIDEMIA, UNSPECIFIED HYPERLIPIDEMIA TYPE: ICD-10-CM

## 2019-06-27 RX ORDER — ATORVASTATIN CALCIUM 20 MG/1
TABLET, FILM COATED ORAL
Qty: 90 TABLET | Refills: 0 | OUTPATIENT
Start: 2019-06-27

## 2019-08-09 RX ORDER — RIVAROXABAN 20 MG/1
TABLET, FILM COATED ORAL
Qty: 90 TABLET | Refills: 2 | Status: SHIPPED | OUTPATIENT
Start: 2019-08-09 | End: 2020-05-22

## 2019-08-31 DIAGNOSIS — E78.5 HYPERLIPIDEMIA, UNSPECIFIED HYPERLIPIDEMIA TYPE: ICD-10-CM

## 2019-09-03 RX ORDER — ATORVASTATIN CALCIUM 20 MG/1
TABLET, FILM COATED ORAL
Qty: 90 TABLET | Refills: 0 | OUTPATIENT
Start: 2019-09-03

## 2019-10-15 ENCOUNTER — HOSPITAL ENCOUNTER (OUTPATIENT)
Dept: GENERAL RADIOLOGY | Facility: HOSPITAL | Age: 59
Discharge: HOME | End: 2019-10-15
Admitting: NURSE PRACTITIONER

## 2019-10-15 ENCOUNTER — OFFICE VISIT (OUTPATIENT)
Dept: INTERNAL MEDICINE | Facility: CLINIC | Age: 59
End: 2019-10-15

## 2019-10-15 VITALS
TEMPERATURE: 98.6 F | BODY MASS INDEX: 35.16 KG/M2 | HEIGHT: 68 IN | RESPIRATION RATE: 16 BRPM | OXYGEN SATURATION: 98 % | WEIGHT: 232 LBS | DIASTOLIC BLOOD PRESSURE: 80 MMHG | SYSTOLIC BLOOD PRESSURE: 124 MMHG | HEART RATE: 69 BPM

## 2019-10-15 DIAGNOSIS — I48.0 PAROXYSMAL ATRIAL FIBRILLATION (HCC): ICD-10-CM

## 2019-10-15 DIAGNOSIS — G47.30 SLEEP APNEA, UNSPECIFIED TYPE: ICD-10-CM

## 2019-10-15 DIAGNOSIS — E04.9 GOITER: ICD-10-CM

## 2019-10-15 DIAGNOSIS — E55.9 VITAMIN D DEFICIENCY: ICD-10-CM

## 2019-10-15 DIAGNOSIS — Z12.39 SCREENING FOR MALIGNANT NEOPLASM OF BREAST: ICD-10-CM

## 2019-10-15 DIAGNOSIS — E78.5 HYPERLIPIDEMIA, UNSPECIFIED HYPERLIPIDEMIA TYPE: ICD-10-CM

## 2019-10-15 DIAGNOSIS — J31.0 CHRONIC RHINITIS: ICD-10-CM

## 2019-10-15 DIAGNOSIS — M25.512 ACUTE PAIN OF LEFT SHOULDER: Primary | ICD-10-CM

## 2019-10-15 DIAGNOSIS — I10 ESSENTIAL HYPERTENSION: ICD-10-CM

## 2019-10-15 DIAGNOSIS — E04.1 LEFT THYROID NODULE: ICD-10-CM

## 2019-10-15 PROCEDURE — 99214 OFFICE O/P EST MOD 30 MIN: CPT | Performed by: NURSE PRACTITIONER

## 2019-10-15 PROCEDURE — 73030 X-RAY EXAM OF SHOULDER: CPT

## 2019-10-15 RX ORDER — ATORVASTATIN CALCIUM 20 MG/1
20 TABLET, FILM COATED ORAL DAILY
Qty: 90 TABLET | Refills: 3 | Status: SHIPPED | OUTPATIENT
Start: 2019-10-15 | End: 2020-08-06

## 2019-10-15 RX ORDER — PREDNISONE 20 MG/1
40 TABLET ORAL DAILY
Qty: 10 TABLET | Refills: 0 | Status: SHIPPED | OUTPATIENT
Start: 2019-10-15 | End: 2019-10-20

## 2019-10-15 NOTE — PROGRESS NOTES
"Subjective    Claudia Gage is a 59 y.o. female presenting today for   Chief Complaint   Patient presents with   • Establish Care   • Hypertension   • Shoulder Pain     left       History of Present Illness     Claudia Gage presents today as a new patient to me to establish care.   Prior PCP was Patrice Briceno and her current/chronic health conditions include:    Patient Active Problem List   Diagnosis   • HTN (hypertension)   • HLD (hyperlipidemia)   • Left thyroid nodule   • Paroxysmal atrial fibrillation (CMS/HCC)   • Goiter   • Vitamin D deficiency   • Sleep apnea   • Chronic rhinitis     She sees Dr. Vu for A fib and tolerates Xarelto w/o complications.    HTN - appears to be under good control w/ current therapy. She denies CP, SOB, HA, or dizziness.    HLD - out of statin for a couple of days but tolerating this well w/o myalgias.    Sleep apnea - tolerating  Cpap but must take antihistamine and nasal spray    Thyroid nodule and goiter - had previously been seeing Ирина, she would like to establish with a different Endo, last US and FNA was 2016, FNA was neg, she reports a sense of \"fullness\" in her neck and occas discomfort with swallowing    New complaints today include:  L shoulder pain - new, onset 2 weeks ago, unchanged, worse with movement, radiating down the LUE, denies injury, no numbness or tingling, constant ache w/ intermittent sharper pain, OTCs include Ibuprofen and Tylenol with little to mild relief    The following portions of the patient's history were reviewed and updated as appropriate: allergies, current medications, past family history, past medical history, past social history, past surgical history and problem list.    Review of Systems   Respiratory: Negative for shortness of breath.    Cardiovascular: Negative for chest pain and palpitations.   Gastrointestinal: Negative for diarrhea, nausea and vomiting.   Musculoskeletal: Positive for arthralgias. Negative for myalgias. " "  Neurological: Negative for dizziness, light-headedness and headache.   All other systems reviewed and are negative.      Objective    Vitals:    10/15/19 1129   BP: 124/80   BP Location: Left arm   Patient Position: Sitting   Cuff Size: Adult   Pulse: 69   Resp: 16   Temp: 98.6 °F (37 °C)   TempSrc: Oral   SpO2: 98%   Weight: 105 kg (232 lb)   Height: 172.7 cm (68\")     Nursing notes and vitals reviewed.    Physical Exam   Constitutional: She is oriented to person, place, and time. She appears well-developed and well-nourished.   HENT:   Head: Normocephalic.   Right Ear: Hearing, tympanic membrane, external ear and ear canal normal.   Left Ear: Hearing, tympanic membrane, external ear and ear canal normal.   Nose: Nose normal. No mucosal edema or rhinorrhea.   Mouth/Throat: Uvula is midline, oropharynx is clear and moist and mucous membranes are normal. No tonsillar exudate.   Eyes: Conjunctivae, EOM and lids are normal. Pupils are equal, round, and reactive to light.   Neck: Normal range of motion. Neck supple. No thyroid mass and no thyromegaly present.   Cardiovascular: Regular rhythm, S1 normal, S2 normal and normal pulses. Exam reveals no gallop and no friction rub.   No murmur heard.  Pulmonary/Chest: Effort normal and breath sounds normal. She has no wheezes. She has no rhonchi. She has no rales.   Abdominal: Soft. Normal appearance and bowel sounds are normal. There is no hepatosplenomegaly. There is no tenderness. There is no guarding. No hernia.   Musculoskeletal: Normal range of motion. She exhibits no edema or deformity.        Left shoulder: She exhibits tenderness. She exhibits no crepitus and normal strength.   Lymphadenopathy:     She has no cervical adenopathy.   Neurological: She is alert and oriented to person, place, and time. She has normal strength and normal reflexes. No cranial nerve deficit or sensory deficit.   Skin: Skin is warm, dry and intact. No lesion and no rash noted. "   Psychiatric: She has a normal mood and affect. Her speech is normal and behavior is normal. Thought content normal. She is attentive.       Assessment and Plan    Claudia was seen today for establish care, hypertension and shoulder pain.    Diagnoses and all orders for this visit:    Acute pain of left shoulder  -     XR Shoulder 2+ View Left  -     predniSONE (DELTASONE) 20 MG tablet; Take 2 tablets by mouth Daily for 5 days.    Essential hypertension  -     CBC & Differential; Future  -     Comprehensive Metabolic Panel; Future    Hyperlipidemia, unspecified hyperlipidemia type  -     atorvastatin (LIPITOR) 20 MG tablet; Take 1 tablet by mouth Daily.  -     CBC & Differential; Future  -     Comprehensive Metabolic Panel; Future  -     Lipid Panel With / Chol / HDL Ratio; Future    Paroxysmal atrial fibrillation (CMS/HCC)    Sleep apnea, unspecified type    Chronic rhinitis    Vitamin D deficiency  -     Vitamin D 25 Hydroxy; Future    Left thyroid nodule  -     Thyroid Cascade Profile; Future    Goiter  -     Thyroid Cascade Profile; Future    Screening for malignant neoplasm of breast  -     Mammo Screening Bilateral With CAD      Return in about 3 weeks (around 11/5/2019) for Annual Physical with PAP.

## 2019-10-20 ENCOUNTER — RESULTS ENCOUNTER (OUTPATIENT)
Dept: INTERNAL MEDICINE | Facility: CLINIC | Age: 59
End: 2019-10-20

## 2019-10-20 DIAGNOSIS — I10 ESSENTIAL HYPERTENSION: ICD-10-CM

## 2019-10-20 DIAGNOSIS — E78.5 HYPERLIPIDEMIA, UNSPECIFIED HYPERLIPIDEMIA TYPE: ICD-10-CM

## 2019-10-20 DIAGNOSIS — E04.9 GOITER: ICD-10-CM

## 2019-10-20 DIAGNOSIS — E55.9 VITAMIN D DEFICIENCY: ICD-10-CM

## 2019-10-20 DIAGNOSIS — E04.1 LEFT THYROID NODULE: ICD-10-CM

## 2019-10-29 ENCOUNTER — HOSPITAL ENCOUNTER (OUTPATIENT)
Dept: MAMMOGRAPHY | Facility: HOSPITAL | Age: 59
Discharge: HOME | End: 2019-10-29
Admitting: NURSE PRACTITIONER

## 2019-10-29 PROCEDURE — 77063 BREAST TOMOSYNTHESIS BI: CPT

## 2019-10-29 PROCEDURE — 77067 SCR MAMMO BI INCL CAD: CPT

## 2019-10-30 LAB
25(OH)D3+25(OH)D2 SERPL-MCNC: 37 NG/ML (ref 30–100)
ALBUMIN SERPL-MCNC: 4.1 G/DL (ref 3.5–5.5)
ALBUMIN/GLOB SERPL: 1.5 {RATIO} (ref 1.2–2.2)
ALP SERPL-CCNC: 62 IU/L (ref 39–117)
ALT SERPL-CCNC: 23 IU/L (ref 0–32)
AST SERPL-CCNC: 21 IU/L (ref 0–40)
BASOPHILS # BLD AUTO: 0 X10E3/UL (ref 0–0.2)
BASOPHILS NFR BLD AUTO: 1 %
BILIRUB SERPL-MCNC: 0.6 MG/DL (ref 0–1.2)
BUN SERPL-MCNC: 14 MG/DL (ref 6–24)
BUN/CREAT SERPL: 16 (ref 9–23)
CALCIUM SERPL-MCNC: 9.4 MG/DL (ref 8.7–10.2)
CHLORIDE SERPL-SCNC: 102 MMOL/L (ref 96–106)
CHOLEST SERPL-MCNC: 259 MG/DL (ref 100–199)
CHOLEST/HDLC SERPL: 6.3 RATIO (ref 0–4.4)
CO2 SERPL-SCNC: 23 MMOL/L (ref 20–29)
CREAT SERPL-MCNC: 0.88 MG/DL (ref 0.57–1)
EOSINOPHIL # BLD AUTO: 0.1 X10E3/UL (ref 0–0.4)
EOSINOPHIL NFR BLD AUTO: 2 %
ERYTHROCYTE [DISTWIDTH] IN BLOOD BY AUTOMATED COUNT: 13.8 % (ref 12.3–15.4)
GLOBULIN SER CALC-MCNC: 2.8 G/DL (ref 1.5–4.5)
GLUCOSE SERPL-MCNC: 123 MG/DL (ref 65–99)
HCT VFR BLD AUTO: 39.9 % (ref 34–46.6)
HDLC SERPL-MCNC: 41 MG/DL
HGB BLD-MCNC: 13.3 G/DL (ref 11.1–15.9)
IMM GRANULOCYTES # BLD AUTO: 0 X10E3/UL (ref 0–0.1)
IMM GRANULOCYTES NFR BLD AUTO: 0 %
LDLC SERPL CALC-MCNC: 172 MG/DL (ref 0–99)
LYMPHOCYTES # BLD AUTO: 1.7 X10E3/UL (ref 0.7–3.1)
LYMPHOCYTES NFR BLD AUTO: 28 %
MCH RBC QN AUTO: 28.1 PG (ref 26.6–33)
MCHC RBC AUTO-ENTMCNC: 33.3 G/DL (ref 31.5–35.7)
MCV RBC AUTO: 84 FL (ref 79–97)
MONOCYTES # BLD AUTO: 0.7 X10E3/UL (ref 0.1–0.9)
MONOCYTES NFR BLD AUTO: 11 %
NEUTROPHILS # BLD AUTO: 3.5 X10E3/UL (ref 1.4–7)
NEUTROPHILS NFR BLD AUTO: 58 %
PLATELET # BLD AUTO: 331 X10E3/UL (ref 150–450)
POTASSIUM SERPL-SCNC: 4.9 MMOL/L (ref 3.5–5.2)
PROT SERPL-MCNC: 6.9 G/DL (ref 6–8.5)
RBC # BLD AUTO: 4.73 X10E6/UL (ref 3.77–5.28)
SODIUM SERPL-SCNC: 142 MMOL/L (ref 134–144)
TRIGL SERPL-MCNC: 230 MG/DL (ref 0–149)
TSH SERPL DL<=0.005 MIU/L-ACNC: 0.79 UIU/ML (ref 0.45–4.5)
VLDLC SERPL CALC-MCNC: 46 MG/DL (ref 5–40)
WBC # BLD AUTO: 6 X10E3/UL (ref 3.4–10.8)

## 2019-10-31 LAB
HBA1C MFR BLD: 6.7 % (ref 4.8–5.6)
Lab: NORMAL
WRITTEN AUTHORIZATION: NORMAL

## 2019-11-12 ENCOUNTER — OFFICE VISIT (OUTPATIENT)
Dept: INTERNAL MEDICINE | Facility: CLINIC | Age: 59
End: 2019-11-12

## 2019-11-12 VITALS
DIASTOLIC BLOOD PRESSURE: 88 MMHG | RESPIRATION RATE: 16 BRPM | WEIGHT: 231 LBS | HEIGHT: 68 IN | OXYGEN SATURATION: 96 % | HEART RATE: 116 BPM | TEMPERATURE: 98.3 F | BODY MASS INDEX: 35.01 KG/M2 | SYSTOLIC BLOOD PRESSURE: 140 MMHG

## 2019-11-12 DIAGNOSIS — E78.2 MIXED HYPERLIPIDEMIA: ICD-10-CM

## 2019-11-12 DIAGNOSIS — Z00.01 ENCOUNTER FOR ANNUAL GENERAL MEDICAL EXAMINATION WITH ABNORMAL FINDINGS IN ADULT: Primary | ICD-10-CM

## 2019-11-12 DIAGNOSIS — E04.1 LEFT THYROID NODULE: ICD-10-CM

## 2019-11-12 DIAGNOSIS — E55.9 VITAMIN D DEFICIENCY: ICD-10-CM

## 2019-11-12 DIAGNOSIS — I10 ESSENTIAL HYPERTENSION: ICD-10-CM

## 2019-11-12 DIAGNOSIS — I48.0 PAROXYSMAL ATRIAL FIBRILLATION (HCC): ICD-10-CM

## 2019-11-12 DIAGNOSIS — E04.9 GOITER: ICD-10-CM

## 2019-11-12 DIAGNOSIS — E11.9 TYPE 2 DIABETES MELLITUS WITHOUT COMPLICATION, WITHOUT LONG-TERM CURRENT USE OF INSULIN (HCC): ICD-10-CM

## 2019-11-12 PROCEDURE — 99396 PREV VISIT EST AGE 40-64: CPT | Performed by: NURSE PRACTITIONER

## 2019-11-12 PROCEDURE — 93000 ELECTROCARDIOGRAM COMPLETE: CPT | Performed by: NURSE PRACTITIONER

## 2019-11-12 NOTE — PROGRESS NOTES
Procedure     ECG 12 Lead  Date/Time: 11/12/2019 10:13 AM  Performed by: Darlin Kirkland APRN  Authorized by: Darlin Kirkland APRN   Comparison: compared with previous ECG   Rhythm: atrial fibrillation

## 2019-11-12 NOTE — PROGRESS NOTES
"NEGRA Taylor is a 59 y.o. female presenting for Physical With Pap    Her current/chronic health conditions include:  Patient Active Problem List   Diagnosis   • HTN (hypertension)   • HLD (hyperlipidemia)   • Left thyroid nodule   • Paroxysmal atrial fibrillation (CMS/HCC)   • Goiter   • Vitamin D deficiency   • Sleep apnea   • Chronic rhinitis   • Type 2 diabetes mellitus without complication, without long-term current use of insulin (CMS/HCC)     Afib - needs f/u w/ Vu in January. EKG w/ fib today. Denies CP, SOB, HA, or dizziness.    L shoulder pain resolved w/ steroid.    HLD - w/o statin x3 mo, restarted about 2 weeks ago. No SEs w/ med.    Thyroid nodule/Goiter - has contact information for Endo but has not called yet      Screenings:  Last pap date: uncertain  Abnormal pap? none  Mammogram: 10/19  Dexa: n/a  Colonoscopy: past due, she received reminder to schedule w/  but has not called yet  Tob use: never  Qualifies for lung Ca screening? no    Complaints today include: none      The following portions of the patient's history were reviewed and updated as appropriate: allergies, current medications, problem list, past medical history, past family history, past medical history, and past social history.    Review of Systems   Respiratory: Negative for shortness of breath.    Cardiovascular: Positive for palpitations. Negative for chest pain.   Neurological: Negative for dizziness and headaches.   All other systems reviewed and are negative.      OBJECTIVE    /88 (BP Location: Left arm, Patient Position: Sitting, Cuff Size: Adult)   Pulse 116   Temp 98.3 °F (36.8 °C) (Oral)   Resp 16   Ht 172.7 cm (68\")   Wt 105 kg (231 lb)   SpO2 96%   Breastfeeding? No   BMI 35.12 kg/m²   Body mass index is 35.12 kg/m².  Nursing notes and vital signs reviewed.    Physical Exam   Constitutional: She is oriented to person, place, and time. She appears well-developed and well-nourished. No " distress.   HENT:   Head: Normocephalic.   Right Ear: Hearing, tympanic membrane, external ear and ear canal normal.   Left Ear: Hearing, tympanic membrane, external ear and ear canal normal.   Nose: Nose normal. No mucosal edema or rhinorrhea.   Mouth/Throat: Uvula is midline, oropharynx is clear and moist and mucous membranes are normal. No tonsillar exudate.   Eyes: Conjunctivae, EOM and lids are normal. Pupils are equal, round, and reactive to light.   Neck: Normal range of motion. Neck supple. No thyroid mass and no thyromegaly present.   Cardiovascular: Regular rhythm, S1 normal, S2 normal and normal pulses. Exam reveals no gallop and no friction rub.   No murmur heard.  Pulmonary/Chest: Effort normal and breath sounds normal. She has no wheezes. She has no rhonchi. She has no rales. Right breast exhibits no inverted nipple, no mass, no nipple discharge and no skin change. Left breast exhibits no inverted nipple, no mass, no nipple discharge and no skin change.   Abdominal: Soft. Normal appearance and bowel sounds are normal. There is no hepatosplenomegaly. There is no tenderness. There is no guarding. No hernia.   Genitourinary: Vagina normal and uterus normal. There is no rash or lesion on the right labia. There is no rash or lesion on the left labia. Cervix exhibits no motion tenderness, no discharge and no friability. Right adnexum displays no mass, no tenderness and no fullness. Left adnexum displays no mass, no tenderness and no fullness.   Musculoskeletal: Normal range of motion. She exhibits no edema, tenderness or deformity.   Lymphadenopathy:     She has no cervical adenopathy.   Neurological: She is alert and oriented to person, place, and time. She has normal strength and normal reflexes. No cranial nerve deficit or sensory deficit.   Skin: Skin is warm, dry and intact. No lesion and no rash noted.   Psychiatric: She has a normal mood and affect. Her speech is normal and behavior is normal. She is  attentive.       ASSESSMENT AND PLAN    Claudia was seen today for physical with pap.    Diagnoses and all orders for this visit:    Encounter for annual general medical examination with abnormal findings in adult  -     Pap IG, Rfx HPV ASCU    Type 2 diabetes mellitus without complication, without long-term current use of insulin (CMS/HCC)    Paroxysmal atrial fibrillation (CMS/HCC)    Essential hypertension    Mixed hyperlipidemia    Vitamin D deficiency    Left thyroid nodule    Goiter      DM is a new diagnosis today. Lengthy counseling regarding disease process, sequela, dietary changes, increased exercise, and wgt loss. Will start Metformin and titrate up to 1000mg BID.    She will f/u w/ Cardiology as scheduled.     She has contact info GI and Endo.    Advised to get Shingles vaccine at pharmacy.    Labs reviewed. Preventative counseling completed.    Return in about 12 weeks (around 2/4/2020)., or sooner if needed.

## 2019-11-12 NOTE — PATIENT INSTRUCTIONS
Start Metformin with one tablets at nighttime for one week. Then two tablets at nighttime for one week. Then one tablet in the morning and two tablets at night for one week. Then two tablets in the morning and at night.

## 2019-11-14 LAB
CONV .: NORMAL
CYTOLOGIST CVX/VAG CYTO: NORMAL
CYTOLOGY CVX/VAG DOC CYTO: NORMAL
CYTOLOGY CVX/VAG DOC THIN PREP: NORMAL
DX ICD CODE: NORMAL
HIV 1 & 2 AB SER-IMP: NORMAL
OTHER STN SPEC: NORMAL
STAT OF ADQ CVX/VAG CYTO-IMP: NORMAL

## 2019-11-17 ENCOUNTER — RESULTS ENCOUNTER (OUTPATIENT)
Dept: INTERNAL MEDICINE | Facility: CLINIC | Age: 59
End: 2019-11-17

## 2019-11-17 DIAGNOSIS — E11.9 TYPE 2 DIABETES MELLITUS WITHOUT COMPLICATION, WITHOUT LONG-TERM CURRENT USE OF INSULIN (HCC): ICD-10-CM

## 2019-11-17 DIAGNOSIS — E78.2 MIXED HYPERLIPIDEMIA: ICD-10-CM

## 2019-11-17 DIAGNOSIS — Z00.01 ENCOUNTER FOR ANNUAL GENERAL MEDICAL EXAMINATION WITH ABNORMAL FINDINGS IN ADULT: ICD-10-CM

## 2019-11-17 DIAGNOSIS — I10 ESSENTIAL HYPERTENSION: ICD-10-CM

## 2019-12-17 RX ORDER — METOPROLOL SUCCINATE 100 MG/1
TABLET, EXTENDED RELEASE ORAL
Qty: 135 TABLET | Refills: 0 | Status: SHIPPED | OUTPATIENT
Start: 2019-12-17 | End: 2020-03-02

## 2019-12-23 ENCOUNTER — OFFICE VISIT (OUTPATIENT)
Dept: CARDIOLOGY | Facility: CLINIC | Age: 59
End: 2019-12-23

## 2019-12-23 VITALS
RESPIRATION RATE: 16 BRPM | WEIGHT: 229 LBS | HEIGHT: 68 IN | SYSTOLIC BLOOD PRESSURE: 120 MMHG | DIASTOLIC BLOOD PRESSURE: 78 MMHG | HEART RATE: 85 BPM | BODY MASS INDEX: 34.71 KG/M2 | OXYGEN SATURATION: 96 %

## 2019-12-23 DIAGNOSIS — G47.30 SLEEP APNEA, UNSPECIFIED TYPE: ICD-10-CM

## 2019-12-23 DIAGNOSIS — E11.9 TYPE 2 DIABETES MELLITUS WITHOUT COMPLICATION, WITHOUT LONG-TERM CURRENT USE OF INSULIN (HCC): ICD-10-CM

## 2019-12-23 DIAGNOSIS — I10 ESSENTIAL HYPERTENSION: ICD-10-CM

## 2019-12-23 DIAGNOSIS — E78.2 MIXED HYPERLIPIDEMIA: ICD-10-CM

## 2019-12-23 DIAGNOSIS — I48.0 PAROXYSMAL ATRIAL FIBRILLATION (HCC): Primary | ICD-10-CM

## 2019-12-23 PROCEDURE — 93000 ELECTROCARDIOGRAM COMPLETE: CPT | Performed by: NURSE PRACTITIONER

## 2019-12-23 PROCEDURE — 99214 OFFICE O/P EST MOD 30 MIN: CPT | Performed by: NURSE PRACTITIONER

## 2019-12-23 NOTE — PROGRESS NOTES
Date of Office Visit: 2019  Encounter Provider: QUIN Cordero  Place of Service: [facility] CARDIOLOGY  Patient Name: Claudia Gage  :1960  Primary Cardiologist: Dr. Vu    CC:  Annual cardiac evaluation    Dear Dr. Kirkland    HPI: Claudia Gage is a pleasant 59 y.o. female who presents 2019 for cardiac follow up. She is a new patient to me and I have reviewed her past medical records.  She is a patient of Dr. Vu.    She has a history of hypertension and hyperlipidemia and treated.  She also has obstructive sleep apnea for which she uses a CPAP. She has recurrent ear infections in the right ear.  She also had paroxysmal atrial fibrillation and is on Xarelto.       She used to follow with Dr. Gonzalez. She had an echocardiogram done there in 2015, which showed ejection fraction of 55% with mild left atrial dilation and normal cardiac valves.         She is noted to have a left thyroid nodule.       She does not smoke. She is  and works part-time and has four children who are grown. She has alcohol about once or twice per year. 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       Her mother  with coronary artery disease and she had hypertension and diabetes mellitus.     She denies any palpitations, shortness of air, lower extremity edema.  She is had no episode of chest pain or chest tightness, lightheadedness dizziness.  She denies any fatigue.  She has had no unexplained leg pain, numbness or tingling her upper or lower extremities.  She does have obstructive sleep apnea and uses a CPAP machine.  She is taking her medications as directed.  She denies any unexplained bleeding.  Past Medical History:   Diagnosis Date   • Chronic rhinitis 10/15/2019   • Hyperlipidemia    • Hypertension    • Left thyroid nodule 2016   • Sleep apnea    • Type 2 diabetes mellitus without complication, without long-term current use of insulin (CMS/[facility])  2019       Past Surgical History:   Procedure Laterality Date   • APPENDECTOMY  1971   •  SECTION  1998       Social History     Socioeconomic History   • Marital status:      Spouse name: Not on file   • Number of children: Not on file   • Years of education: Not on file   • Highest education level: Not on file   Tobacco Use   • Smoking status: Never Smoker   • Smokeless tobacco: Never Used   Substance and Sexual Activity   • Alcohol use: Yes     Comment: 1-2 yearly   • Drug use: No   • Sexual activity: Never       Family History   Problem Relation Age of Onset   • Heart disease Mother    • Hypertension Mother    • Diabetes Mother    • Hypertension Father    • Melanoma Father    • Breast cancer Paternal Aunt        The following portion of the patient's history were reviewed and updated as appropriate: past medical history, past surgical history, past social history, past family history, allergies, current medications, and problem list.    Review of Systems   Constitution: Negative for diaphoresis, fever and malaise/fatigue.   HENT: Negative for congestion, hearing loss, hoarse voice, nosebleeds and sore throat.    Eyes: Negative for photophobia, vision loss in left eye, vision loss in right eye and visual disturbance.   Cardiovascular: Positive for irregular heartbeat and palpitations. Negative for chest pain, dyspnea on exertion, leg swelling, near-syncope, orthopnea, paroxysmal nocturnal dyspnea and syncope.   Respiratory: Positive for snoring. Negative for cough, hemoptysis, shortness of breath, sleep disturbances due to breathing, sputum production and wheezing.    Endocrine: Negative for cold intolerance, heat intolerance, polydipsia, polyphagia and polyuria.   Hematologic/Lymphatic: Negative for bleeding problem. Does not bruise/bleed easily.   Skin: Negative for color change, dry skin, poor wound healing, rash and suspicious lesions.   Musculoskeletal: Negative for arthritis, back  "pain, falls, gout, joint pain, joint swelling, muscle cramps, muscle weakness and myalgias.   Gastrointestinal: Negative for bloating, abdominal pain, constipation, diarrhea, dysphagia, melena, nausea and vomiting.   Neurological: Negative for excessive daytime sleepiness, dizziness, headaches, light-headedness, loss of balance, numbness, paresthesias, seizures, vertigo and weakness.   Psychiatric/Behavioral: Negative for depression, memory loss and substance abuse. The patient is not nervous/anxious.        Allergies   Allergen Reactions   • Latex    • Lisinopril Cough   • Sulfa Antibiotics          Current Outpatient Medications:   •  atorvastatin (LIPITOR) 20 MG tablet, Take 1 tablet by mouth Daily., Disp: 90 tablet, Rfl: 3  •  clobetasol (TEMOVATE) 0.05 % cream, APPLY TWICE DAILY AS DIRECTED, Disp: 60 g, Rfl: 1  •  fluticasone (FLONASE) 50 MCG/ACT nasal spray, 2 sprays into each nostril Daily., Disp: 1 each, Rfl: 11  •  Loratadine (CLARITIN PO), Take 1 tablet by mouth Daily., Disp: , Rfl:   •  metFORMIN (GLUCOPHAGE) 500 MG tablet, Take 2 tablets by mouth 2 (Two) Times a Day., Disp: 120 tablet, Rfl: 3  •  metoprolol succinate XL (TOPROL-XL) 100 MG 24 hr tablet, TAKE 1.5 TABLETS BY MOUTH ONCE NIGHTLY, Disp: 135 tablet, Rfl: 0  •  valsartan-hydrochlorothiazide (DIOVAN HCT) 160-12.5 MG per tablet, Take 1 tablet by mouth Daily., Disp: 90 tablet, Rfl: 3  •  vitamin D (ERGOCALCIFEROL) 91138 units capsule capsule, Take 1 capsule by mouth 1 (One) Time Per Week., Disp: 10 capsule, Rfl: 0  •  XARELTO 20 MG tablet, TAKE 1 TABLET BY MOUTH EVERY DAY, Disp: 90 tablet, Rfl: 2        Objective:     Vitals:    12/23/19 1416   BP: 120/78   BP Location: Left arm   Patient Position: Sitting   Cuff Size: Large Adult   Pulse: 85   Resp: 16   SpO2: 96%   Weight: 104 kg (229 lb)   Height: 172.7 cm (68\")     Body mass index is 34.82 kg/m².      Physical Exam   Constitutional: She is oriented to person, place, and time. She appears " well-developed and well-nourished. No distress.   HENT:   Head: Normocephalic and atraumatic.   Right Ear: External ear normal.   Left Ear: External ear normal.   Nose: Nose normal.   Eyes: Pupils are equal, round, and reactive to light. Conjunctivae are normal. Right eye exhibits no discharge. Left eye exhibits no discharge.   Neck: Normal range of motion. Neck supple. No JVD present. No tracheal deviation present. No thyromegaly present.   Cardiovascular: Normal rate, regular rhythm, normal heart sounds and intact distal pulses. Exam reveals no gallop and no friction rub.   No murmur heard.  Pulmonary/Chest: Effort normal and breath sounds normal. No respiratory distress. She has no wheezes. She has no rales. She exhibits no tenderness.   Abdominal: Soft. Bowel sounds are normal. She exhibits no distension. There is no tenderness.   Musculoskeletal: Normal range of motion. She exhibits no edema, tenderness or deformity.   Lymphadenopathy:     She has no cervical adenopathy.   Neurological: She is alert and oriented to person, place, and time. Coordination normal.   Skin: Skin is warm and dry. No rash noted. No erythema.   Psychiatric: She has a normal mood and affect. Her behavior is normal. Judgment and thought content normal.             ECG 12 Lead  Date/Time: 12/23/2019 2:24 PM  Performed by: Mayte Miranda APRN  Authorized by: Mayte Miranda APRN   Comparison: compared with previous ECG from 11/12/2019  Rhythm: sinus rhythm  Rate: normal  Conduction: conduction normal  ST Segments: ST segments normal  T Waves: T waves normal  QRS axis: normal    Clinical impression: non-specific ECG              Assessment:       Diagnosis Plan   1. Paroxysmal atrial fibrillation (CMS/HCC)  Adult Transthoracic Echo Complete W/ Cont if Necessary Per Protocol   2. Essential hypertension     3. Mixed hyperlipidemia     4. Sleep apnea, unspecified type     5. Type 2 diabetes mellitus without complication, without long-term  "current use of insulin (CMS/[facility])            Plan:       1. PAF - SR at time of visit, but states she was in Afib earlier today. On BB and Xarelto.  Check echo, last 6/15.    2. HTN - controlled    3.  Hyperlipidemia - continue lipid lowering therapy.  She is on atorvastatin 20 mg.    4. SIMIN - states she uses her CPAP \"almost all the time\".  Encouraged compliance.    5. DM Type II - per primary    6.  Obesity - she would benefit from a weight loss program.     Echo    RTO 1 year with RM     As always, it has been a pleasure to participate in your patient's care. Thank you.       Sincerely,       QUIN Cordero        Current Outpatient Medications:   •  atorvastatin (LIPITOR) 20 MG tablet, Take 1 tablet by mouth Daily., Disp: 90 tablet, Rfl: 3  •  clobetasol (TEMOVATE) 0.05 % cream, APPLY TWICE DAILY AS DIRECTED, Disp: 60 g, Rfl: 1  •  fluticasone (FLONASE) 50 MCG/ACT nasal spray, 2 sprays into each nostril Daily., Disp: 1 each, Rfl: 11  •  Loratadine (CLARITIN PO), Take 1 tablet by mouth Daily., Disp: , Rfl:   •  metFORMIN (GLUCOPHAGE) 500 MG tablet, Take 2 tablets by mouth 2 (Two) Times a Day., Disp: 120 tablet, Rfl: 3  •  metoprolol succinate XL (TOPROL-XL) 100 MG 24 hr tablet, TAKE 1.5 TABLETS BY MOUTH ONCE NIGHTLY, Disp: 135 tablet, Rfl: 0  •  valsartan-hydrochlorothiazide (DIOVAN HCT) 160-12.5 MG per tablet, Take 1 tablet by mouth Daily., Disp: 90 tablet, Rfl: 3  •  vitamin D (ERGOCALCIFEROL) 88944 units capsule capsule, Take 1 capsule by mouth 1 (One) Time Per Week., Disp: 10 capsule, Rfl: 0  •  XARELTO 20 MG tablet, TAKE 1 TABLET BY MOUTH EVERY DAY, Disp: 90 tablet, Rfl: 2  Dictated utilizing Dragon dictation        "

## 2020-02-18 DIAGNOSIS — I48.0 PAROXYSMAL ATRIAL FIBRILLATION (HCC): Primary | ICD-10-CM

## 2020-02-19 LAB
ALBUMIN SERPL-MCNC: 4.1 G/DL (ref 3.5–5.2)
ALBUMIN/GLOB SERPL: 1.4 G/DL
ALP SERPL-CCNC: 65 U/L (ref 39–117)
ALT SERPL-CCNC: 20 U/L (ref 1–33)
AST SERPL-CCNC: 17 U/L (ref 1–32)
BASOPHILS # BLD AUTO: 0.08 10*3/MM3 (ref 0–0.2)
BASOPHILS NFR BLD AUTO: 1.3 % (ref 0–1.5)
BILIRUB SERPL-MCNC: 0.7 MG/DL (ref 0.2–1.2)
BUN SERPL-MCNC: 14 MG/DL (ref 6–20)
BUN/CREAT SERPL: 16.1 (ref 7–25)
CALCIUM SERPL-MCNC: 9.4 MG/DL (ref 8.6–10.5)
CHLORIDE SERPL-SCNC: 101 MMOL/L (ref 98–107)
CHOLEST SERPL-MCNC: 188 MG/DL (ref 0–200)
CHOLEST/HDLC SERPL: 3.84 {RATIO}
CO2 SERPL-SCNC: 27.6 MMOL/L (ref 22–29)
CREAT SERPL-MCNC: 0.87 MG/DL (ref 0.57–1)
EOSINOPHIL # BLD AUTO: 0.29 10*3/MM3 (ref 0–0.4)
EOSINOPHIL NFR BLD AUTO: 4.7 % (ref 0.3–6.2)
ERYTHROCYTE [DISTWIDTH] IN BLOOD BY AUTOMATED COUNT: 13.1 % (ref 12.3–15.4)
GLOBULIN SER CALC-MCNC: 2.9 GM/DL
GLUCOSE SERPL-MCNC: 115 MG/DL (ref 65–99)
HBA1C MFR BLD: 6.5 % (ref 4.8–5.6)
HCT VFR BLD AUTO: 41.6 % (ref 34–46.6)
HCV AB S/CO SERPL IA: 0.1 S/CO RATIO (ref 0–0.9)
HDLC SERPL-MCNC: 49 MG/DL (ref 40–60)
HGB BLD-MCNC: 13.9 G/DL (ref 12–15.9)
IMM GRANULOCYTES # BLD AUTO: 0 10*3/MM3 (ref 0–0.05)
IMM GRANULOCYTES NFR BLD AUTO: 0 % (ref 0–0.5)
LDLC SERPL CALC-MCNC: 99 MG/DL (ref 0–100)
LYMPHOCYTES # BLD AUTO: 1.83 10*3/MM3 (ref 0.7–3.1)
LYMPHOCYTES NFR BLD AUTO: 29.8 % (ref 19.6–45.3)
MCH RBC QN AUTO: 28.5 PG (ref 26.6–33)
MCHC RBC AUTO-ENTMCNC: 33.4 G/DL (ref 31.5–35.7)
MCV RBC AUTO: 85.2 FL (ref 79–97)
MONOCYTES # BLD AUTO: 0.55 10*3/MM3 (ref 0.1–0.9)
MONOCYTES NFR BLD AUTO: 9 % (ref 5–12)
NEUTROPHILS # BLD AUTO: 3.39 10*3/MM3 (ref 1.7–7)
NEUTROPHILS NFR BLD AUTO: 55.2 % (ref 42.7–76)
NRBC BLD AUTO-RTO: 0 /100 WBC (ref 0–0.2)
PLATELET # BLD AUTO: 283 10*3/MM3 (ref 140–450)
POTASSIUM SERPL-SCNC: 4.4 MMOL/L (ref 3.5–5.2)
PROT SERPL-MCNC: 7 G/DL (ref 6–8.5)
RBC # BLD AUTO: 4.88 10*6/MM3 (ref 3.77–5.28)
SODIUM SERPL-SCNC: 140 MMOL/L (ref 136–145)
TRIGL SERPL-MCNC: 200 MG/DL (ref 0–150)
VLDLC SERPL CALC-MCNC: 40 MG/DL
WBC # BLD AUTO: 6.14 10*3/MM3 (ref 3.4–10.8)

## 2020-03-02 RX ORDER — METOPROLOL SUCCINATE 100 MG/1
TABLET, EXTENDED RELEASE ORAL
Qty: 135 TABLET | Refills: 2 | Status: SHIPPED | OUTPATIENT
Start: 2020-03-02 | End: 2020-11-05

## 2020-03-10 ENCOUNTER — OFFICE VISIT (OUTPATIENT)
Dept: INTERNAL MEDICINE | Facility: CLINIC | Age: 60
End: 2020-03-10

## 2020-03-10 ENCOUNTER — HOSPITAL ENCOUNTER (OUTPATIENT)
Dept: CARDIOLOGY | Facility: HOSPITAL | Age: 60
Discharge: HOME | End: 2020-03-10
Admitting: NURSE PRACTITIONER

## 2020-03-10 VITALS
BODY MASS INDEX: 34.1 KG/M2 | HEIGHT: 68 IN | HEART RATE: 75 BPM | WEIGHT: 225 LBS | SYSTOLIC BLOOD PRESSURE: 137 MMHG | OXYGEN SATURATION: 100 % | DIASTOLIC BLOOD PRESSURE: 62 MMHG

## 2020-03-10 VITALS
TEMPERATURE: 98.2 F | DIASTOLIC BLOOD PRESSURE: 72 MMHG | HEART RATE: 82 BPM | RESPIRATION RATE: 16 BRPM | WEIGHT: 226 LBS | SYSTOLIC BLOOD PRESSURE: 122 MMHG | OXYGEN SATURATION: 99 % | BODY MASS INDEX: 34.25 KG/M2 | HEIGHT: 68 IN

## 2020-03-10 DIAGNOSIS — I10 ESSENTIAL HYPERTENSION: Primary | ICD-10-CM

## 2020-03-10 DIAGNOSIS — E11.9 TYPE 2 DIABETES MELLITUS WITHOUT COMPLICATION, WITHOUT LONG-TERM CURRENT USE OF INSULIN (HCC): ICD-10-CM

## 2020-03-10 DIAGNOSIS — I48.0 PAROXYSMAL ATRIAL FIBRILLATION (HCC): ICD-10-CM

## 2020-03-10 DIAGNOSIS — E04.9 GOITER: ICD-10-CM

## 2020-03-10 DIAGNOSIS — E04.1 LEFT THYROID NODULE: ICD-10-CM

## 2020-03-10 DIAGNOSIS — E55.9 VITAMIN D DEFICIENCY: ICD-10-CM

## 2020-03-10 DIAGNOSIS — M53.3 PAIN OF RIGHT SACROILIAC JOINT: ICD-10-CM

## 2020-03-10 DIAGNOSIS — G47.30 SLEEP APNEA, UNSPECIFIED TYPE: ICD-10-CM

## 2020-03-10 DIAGNOSIS — E78.2 MIXED HYPERLIPIDEMIA: ICD-10-CM

## 2020-03-10 LAB
AORTIC DIMENSIONLESS INDEX: 0.5 (DI)
BH CV ECHO MEAS - ACS: 1.6 CM
BH CV ECHO MEAS - AO ARCH DIAM (PROXIMAL TRANS.): 2.4 CM
BH CV ECHO MEAS - AO MAX PG: 20 MMHG
BH CV ECHO MEAS - AO MEAN PG (FULL): 5.8 MMHG
BH CV ECHO MEAS - AO MEAN PG: 7.8 MMHG
BH CV ECHO MEAS - AO ROOT AREA (BSA CORRECTED): 1.3
BH CV ECHO MEAS - AO ROOT AREA: 6.6 CM^2
BH CV ECHO MEAS - AO ROOT DIAM: 2.9 CM
BH CV ECHO MEAS - AO V2 MAX: 224 CM/SEC
BH CV ECHO MEAS - AO V2 MEAN: 127.6 CM/SEC
BH CV ECHO MEAS - AO V2 VTI: 39.5 CM
BH CV ECHO MEAS - ASC AORTA: 2.5 CM
BH CV ECHO MEAS - AVA(I,A): 1.7 CM^2
BH CV ECHO MEAS - AVA(I,D): 1.7 CM^2
BH CV ECHO MEAS - BSA(HAYCOCK): 2.3 M^2
BH CV ECHO MEAS - BSA: 2.1 M^2
BH CV ECHO MEAS - BZI_BMI: 34.2 KILOGRAMS/M^2
BH CV ECHO MEAS - BZI_METRIC_HEIGHT: 172.7 CM
BH CV ECHO MEAS - BZI_METRIC_WEIGHT: 102.1 KG
BH CV ECHO MEAS - DESC AORTA: 1.6 CM
BH CV ECHO MEAS - EDV(CUBED): 104.5 ML
BH CV ECHO MEAS - EDV(MOD-SP2): 98.8 ML
BH CV ECHO MEAS - EDV(MOD-SP4): 56.5 ML
BH CV ECHO MEAS - EDV(TEICH): 102.9 ML
BH CV ECHO MEAS - EF(CUBED): 63.7 %
BH CV ECHO MEAS - EF(MOD-BP): 69 %
BH CV ECHO MEAS - EF(MOD-SP2): 69.1 %
BH CV ECHO MEAS - EF(MOD-SP4): 67.4 %
BH CV ECHO MEAS - EF(TEICH): 55.2 %
BH CV ECHO MEAS - ESV(CUBED): 37.9 ML
BH CV ECHO MEAS - ESV(MOD-SP2): 30.5 ML
BH CV ECHO MEAS - ESV(MOD-SP4): 18.4 ML
BH CV ECHO MEAS - ESV(TEICH): 46.1 ML
BH CV ECHO MEAS - FS: 28.7 %
BH CV ECHO MEAS - IVS/LVPW: 1.2
BH CV ECHO MEAS - IVSD: 0.99 CM
BH CV ECHO MEAS - LA DIMENSION: 3.7 CM
BH CV ECHO MEAS - LA/AO: 1.3
BH CV ECHO MEAS - LAT PEAK E' VEL: 10 CM/SEC
BH CV ECHO MEAS - LV DIASTOLIC VOL/BSA (35-75): 26.3 ML/M^2
BH CV ECHO MEAS - LV MASS(C)D: 147.5 GRAMS
BH CV ECHO MEAS - LV MASS(C)DI: 68.6 GRAMS/M^2
BH CV ECHO MEAS - LV MAX PG: 4 MMHG
BH CV ECHO MEAS - LV MEAN PG: 2 MMHG
BH CV ECHO MEAS - LV SYSTOLIC VOL/BSA (12-30): 8.6 ML/M^2
BH CV ECHO MEAS - LV V1 MAX: 101 CM/SEC
BH CV ECHO MEAS - LV V1 MEAN: 69 CM/SEC
BH CV ECHO MEAS - LV V1 VTI: 24.1 CM
BH CV ECHO MEAS - LVIDD: 4.7 CM
BH CV ECHO MEAS - LVIDS: 3.4 CM
BH CV ECHO MEAS - LVLD AP2: 8 CM
BH CV ECHO MEAS - LVLD AP4: 7.8 CM
BH CV ECHO MEAS - LVLS AP2: 6.3 CM
BH CV ECHO MEAS - LVLS AP4: 6.2 CM
BH CV ECHO MEAS - LVOT AREA (M): 2.8 CM^2
BH CV ECHO MEAS - LVOT AREA: 2.8 CM^2
BH CV ECHO MEAS - LVOT DIAM: 1.9 CM
BH CV ECHO MEAS - LVPWD: 0.86 CM
BH CV ECHO MEAS - MED PEAK E' VEL: 9 CM/SEC
BH CV ECHO MEAS - MV A DUR: 0.15 SEC
BH CV ECHO MEAS - MV A MAX VEL: 60.4 CM/SEC
BH CV ECHO MEAS - MV DEC SLOPE: 569 CM/SEC^2
BH CV ECHO MEAS - MV DEC TIME: 190 SEC
BH CV ECHO MEAS - MV E MAX VEL: 94.7 CM/SEC
BH CV ECHO MEAS - MV E/A: 1.6
BH CV ECHO MEAS - MV MAX PG: 4 MMHG
BH CV ECHO MEAS - MV MEAN PG: 1 MMHG
BH CV ECHO MEAS - MV P1/2T MAX VEL: 106 CM/SEC
BH CV ECHO MEAS - MV P1/2T: 54.6 MSEC
BH CV ECHO MEAS - MV V2 MEAN: 55.6 CM/SEC
BH CV ECHO MEAS - MV V2 VTI: 27.1 CM
BH CV ECHO MEAS - MVA P1/2T LCG: 2.1 CM^2
BH CV ECHO MEAS - MVA(P1/2T): 4 CM^2
BH CV ECHO MEAS - MVA(VTI): 2.5 CM^2
BH CV ECHO MEAS - PA ACC TIME: 0.07 SEC
BH CV ECHO MEAS - PA MAX PG: 5.2 MMHG
BH CV ECHO MEAS - PA PR(ACCEL): 47.1 MMHG
BH CV ECHO MEAS - PA V2 MAX: 114 CM/SEC
BH CV ECHO MEAS - PULM A REVS DUR: 0.13 SEC
BH CV ECHO MEAS - PULM A REVS VEL: 45.4 CM/SEC
BH CV ECHO MEAS - PULM DIAS VEL: 50.1 CM/SEC
BH CV ECHO MEAS - PULM S/D: 0.71
BH CV ECHO MEAS - PULM SYS VEL: 35.6 CM/SEC
BH CV ECHO MEAS - QP/QS: 0.62
BH CV ECHO MEAS - RAP SYSTOLE: 3 MMHG
BH CV ECHO MEAS - RV MAX PG: 2 MMHG
BH CV ECHO MEAS - RV MEAN PG: 1 MMHG
BH CV ECHO MEAS - RV V1 MAX: 0.7 CM/SEC
BH CV ECHO MEAS - RV V1 MEAN: 41.5 CM/SEC
BH CV ECHO MEAS - RV V1 VTI: 13.4 CM
BH CV ECHO MEAS - RVOT AREA: 3.1 CM^2
BH CV ECHO MEAS - RVOT DIAM: 2 CM
BH CV ECHO MEAS - RVSP: 21 MMHG
BH CV ECHO MEAS - SI(AO): 121.4 ML/M^2
BH CV ECHO MEAS - SI(CUBED): 31 ML/M^2
BH CV ECHO MEAS - SI(LVOT): 31.8 ML/M^2
BH CV ECHO MEAS - SI(MOD-SP2): 31.8 ML/M^2
BH CV ECHO MEAS - SI(MOD-SP4): 17.7 ML/M^2
BH CV ECHO MEAS - SI(TEICH): 26.4 ML/M^2
BH CV ECHO MEAS - SV(AO): 260.8 ML
BH CV ECHO MEAS - SV(CUBED): 66.6 ML
BH CV ECHO MEAS - SV(LVOT): 68.3 ML
BH CV ECHO MEAS - SV(MOD-SP2): 68.3 ML
BH CV ECHO MEAS - SV(MOD-SP4): 38.1 ML
BH CV ECHO MEAS - SV(RVOT): 42.1 ML
BH CV ECHO MEAS - SV(TEICH): 56.8 ML
BH CV ECHO MEAS - TAPSE (>1.6): 2.4 CM
BH CV ECHO MEAS - TR MAX VEL: 213 CM/SEC
BH CV ECHO MEASUREMENTS AVERAGE E/E' RATIO: 9.97
BH CV VAS BP RIGHT ARM: NORMAL MMHG
BH CV XLRA - RV BASE: 3.5 CM
BH CV XLRA - RV LENGTH: 8.2 CM
BH CV XLRA - RV MID: 2.1 CM
BH CV XLRA - TDI S': 13 CM/SEC
LEFT ATRIUM VOLUME INDEX: 17 ML/M2
LEFT ATRIUM VOLUME: 36 CM3
MAXIMAL PREDICTED HEART RATE: 161 BPM
STRESS TARGET HR: 137 BPM

## 2020-03-10 PROCEDURE — 93306 TTE W/DOPPLER COMPLETE: CPT

## 2020-03-10 PROCEDURE — 99214 OFFICE O/P EST MOD 30 MIN: CPT | Performed by: NURSE PRACTITIONER

## 2020-03-10 PROCEDURE — 93306 TTE W/DOPPLER COMPLETE: CPT | Performed by: INTERNAL MEDICINE

## 2020-03-10 RX ORDER — CLOBETASOL PROPIONATE 0.5 MG/G
CREAM TOPICAL 2 TIMES DAILY
Qty: 60 G | Refills: 1 | Status: SHIPPED | OUTPATIENT
Start: 2020-03-10

## 2020-03-10 NOTE — PROGRESS NOTES
"Subjective   Claudia Gage is a 59 y.o. female presenting today for follow up of   Chief Complaint   Patient presents with   • Follow-up     3 x month f/u, lab results   • Hypertension   • Hyperlipidemia   • Hip Pain     off/on, R hip to R pelvis       History of Present Illness     Claudia presents for f/u of her chronic health conditions including HTN, HLD, aFib, DM, Vit D def, SIMIN.    She is feeling well and is tolerating her medication regimen w/o difficulty.    Saw Cardiology in Jan. She is scheduled to get an updated echo later today. Last echo was approx 5 yrs ago.    Has not set up an appt w/ Endo r/t thyroid nodules.    Has not called GI to set up colonoscopy.    She is not able to do much in terms of walking or other exercise d/t a subjective limb discrepancy and R sided SI joint pain. She takes Ibu 800mg at least once each day.    The following portions of the patient's history were reviewed and updated as appropriate: allergies, current medications, past family history, past medical history, past social history, past surgical history and problem list.    Review of Systems   Constitutional: Negative.    HENT: Negative.    Eyes: Negative.    Respiratory: Negative.    Cardiovascular: Negative.    Gastrointestinal: Negative.    Endocrine: Negative.    Genitourinary: Negative.    Musculoskeletal: Positive for arthralgias.   Skin: Negative.    Neurological: Negative.    Hematological: Negative.    Psychiatric/Behavioral: Negative.        Objective   Vitals:    03/10/20 1005   BP: 122/72   BP Location: Left arm   Patient Position: Sitting   Cuff Size: Large Adult   Pulse: 82   Resp: 16   Temp: 98.2 °F (36.8 °C)   TempSrc: Oral   SpO2: 99%   Weight: 103 kg (226 lb)   Height: 172.7 cm (68\")     Body mass index is 34.36 kg/m².  Nursing notes and vital reviewed.    Physical Exam   Constitutional: She is oriented to person, place, and time. She appears well-developed and well-nourished. No distress.   Neck: Neck supple. " No thyroid mass and no thyromegaly present.   Cardiovascular: An irregular rhythm present.   No murmur heard.  Pulmonary/Chest: Effort normal and breath sounds normal.   Lymphadenopathy:     She has no cervical adenopathy.   Neurological: She is alert and oriented to person, place, and time.   Psychiatric: She has a normal mood and affect. Her behavior is normal. Thought content normal. She is attentive.         Assessment/Plan   Diagnoses and all orders for this visit:    1. Essential hypertension (Primary)    2. Mixed hyperlipidemia    3. Paroxysmal atrial fibrillation (CMS/HCC)    4. Sleep apnea, unspecified type    5. Vitamin D deficiency    6. Left thyroid nodule    7. Goiter    8. Type 2 diabetes mellitus without complication, without long-term current use of insulin (CMS/HCC)    Other orders  -     clobetasol (TEMOVATE) 0.05 % cream; Apply  topically to the appropriate area as directed 2 (Two) Times a Day.  Dispense: 60 g; Refill: 1    Labs reviewed.  Except as noted above, pt will continue current medications as noted in the medication list.  Patient counseled regarding therapeutic lifestyle changes including improved nutrition, increased exercise, and weight loss.  Again gave contact info and encouraged to set up colonoscopy.  Gave contact info for ortho.   Discussed appropriate Ibuprofen use.    Return in about 6 months (around 9/10/2020).

## 2020-03-12 DIAGNOSIS — E11.9 TYPE 2 DIABETES MELLITUS WITHOUT COMPLICATION, WITHOUT LONG-TERM CURRENT USE OF INSULIN (HCC): ICD-10-CM

## 2020-03-13 RX ORDER — ERGOCALCIFEROL 1.25 MG/1
CAPSULE ORAL
Qty: 15 CAPSULE | Refills: 3 | Status: SHIPPED | OUTPATIENT
Start: 2020-03-13 | End: 2021-05-04

## 2020-03-15 ENCOUNTER — RESULTS ENCOUNTER (OUTPATIENT)
Dept: INTERNAL MEDICINE | Facility: CLINIC | Age: 60
End: 2020-03-15

## 2020-03-15 DIAGNOSIS — E11.9 TYPE 2 DIABETES MELLITUS WITHOUT COMPLICATION, WITHOUT LONG-TERM CURRENT USE OF INSULIN (HCC): ICD-10-CM

## 2020-03-15 DIAGNOSIS — E55.9 VITAMIN D DEFICIENCY: ICD-10-CM

## 2020-03-15 DIAGNOSIS — E78.2 MIXED HYPERLIPIDEMIA: ICD-10-CM

## 2020-03-15 DIAGNOSIS — I10 ESSENTIAL HYPERTENSION: ICD-10-CM

## 2020-05-22 RX ORDER — RIVAROXABAN 20 MG/1
TABLET, FILM COATED ORAL
Qty: 90 TABLET | Refills: 2 | Status: SHIPPED | OUTPATIENT
Start: 2020-05-22 | End: 2021-01-07

## 2020-05-22 RX ORDER — VALSARTAN AND HYDROCHLOROTHIAZIDE 160; 12.5 MG/1; MG/1
TABLET, FILM COATED ORAL
Qty: 90 TABLET | Refills: 2 | Status: SHIPPED | OUTPATIENT
Start: 2020-05-22 | End: 2021-01-07

## 2020-08-06 DIAGNOSIS — E78.5 HYPERLIPIDEMIA, UNSPECIFIED HYPERLIPIDEMIA TYPE: ICD-10-CM

## 2020-08-06 RX ORDER — ATORVASTATIN CALCIUM 20 MG/1
TABLET, FILM COATED ORAL
Qty: 90 TABLET | Refills: 1 | Status: SHIPPED | OUTPATIENT
Start: 2020-08-06 | End: 2021-01-07

## 2020-11-05 RX ORDER — METOPROLOL SUCCINATE 100 MG/1
TABLET, EXTENDED RELEASE ORAL
Qty: 135 TABLET | Refills: 0 | Status: SHIPPED | OUTPATIENT
Start: 2020-11-05 | End: 2020-12-21 | Stop reason: SDUPTHER

## 2020-11-05 NOTE — TELEPHONE ENCOUNTER
"Next appointment 12/21/2020.    Assessment:        Diagnosis Plan   1. Paroxysmal atrial fibrillation (CMS/[facility])  Adult Transthoracic Echo Complete W/ Cont if Necessary Per Protocol   2. Essential hypertension      3. Mixed hyperlipidemia      4. Sleep apnea, unspecified type      5. Type 2 diabetes mellitus without complication, without long-term current use of insulin (CMS/[facility])               Plan:       1. PAF - SR at time of visit, but states she was in Afib earlier today. On BB and Xarelto.  Check echo, last 6/15.     2. HTN - controlled     3.  Hyperlipidemia - continue lipid lowering therapy.  She is on atorvastatin 20 mg.     4. SIMIN - states she uses her CPAP \"almost all the time\".  Encouraged compliance.     5. DM Type II - per primary     6.  Obesity - she would benefit from a weight loss program.     Echo     RTO 1 year with RM     "

## 2020-12-21 ENCOUNTER — OFFICE VISIT (OUTPATIENT)
Dept: CARDIOLOGY | Facility: CLINIC | Age: 60
End: 2020-12-21

## 2020-12-21 VITALS
HEIGHT: 68 IN | SYSTOLIC BLOOD PRESSURE: 142 MMHG | DIASTOLIC BLOOD PRESSURE: 80 MMHG | BODY MASS INDEX: 34.24 KG/M2 | WEIGHT: 225.9 LBS | HEART RATE: 90 BPM

## 2020-12-21 DIAGNOSIS — E78.2 MIXED HYPERLIPIDEMIA: ICD-10-CM

## 2020-12-21 DIAGNOSIS — I48.0 PAROXYSMAL ATRIAL FIBRILLATION (HCC): ICD-10-CM

## 2020-12-21 DIAGNOSIS — E11.9 TYPE 2 DIABETES MELLITUS WITHOUT COMPLICATION, WITHOUT LONG-TERM CURRENT USE OF INSULIN (HCC): ICD-10-CM

## 2020-12-21 DIAGNOSIS — I10 ESSENTIAL HYPERTENSION: Primary | ICD-10-CM

## 2020-12-21 PROCEDURE — 99214 OFFICE O/P EST MOD 30 MIN: CPT | Performed by: INTERNAL MEDICINE

## 2020-12-21 PROCEDURE — 93000 ELECTROCARDIOGRAM COMPLETE: CPT | Performed by: INTERNAL MEDICINE

## 2020-12-21 RX ORDER — METOPROLOL SUCCINATE 200 MG/1
200 TABLET, EXTENDED RELEASE ORAL NIGHTLY
Qty: 90 TABLET | Refills: 3 | Status: SHIPPED | OUTPATIENT
Start: 2020-12-21 | End: 2022-01-24

## 2020-12-21 NOTE — PROGRESS NOTES
Date of Office Visit: 2020  Encounter Provider: Mayte Vu MD  Place of Service: [facility] CARDIOLOGY  Patient Name: Claudia Gage  :1960      Patient ID:  Claudia Gage is a 60 y.o. female is here for  followup for PAF.         History of Present Illness    She has a history of hypertension and hyperlipidemia and is on treatment for this. She also has obstructive sleep apnea for which she uses a CPAP. She has recurrent ear infections in the right ear but the other issue she has is some paroxysmal atrial fibrillation. She is in atrial fibrillation today. She is not on any blood thinner.       She used to follow with Dr. Gonzalez. She had an echocardiogram done there in 2015, which showed ejection fraction of 55% with mild left atrial dilation and normal cardiac valves.       She is not exercising and she has gained weight. She has followed with Dr. Briceno for her medical treatment.       She is noted to have a left thyroid nodule.       She does not smoke. She is  and works part-time and has four children who are grown. She has alcohol about once or twice per year. 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       Her mother  with coronary artery disease and she had hypertension and diabetes mellitus.     labs done 2020 showed glucose 115, otherwise normal CMP, hemoglobin A1c 6.5, HDL 49, LDL 99, triglycerides 200, total cholesterol 188, normal CBC.  Cardiogram done 3/10/2020 showed ejection fraction 69%, normal diastolic function, normal cardiac chamber sizes, aortic valve calcification but no significant insufficiency or stenosis.  He saw Donell Ruth in 2019 and was doing well.    Last week, she started to feel her heart skipping.  She thought she was back in atrial fibrillation.  She had no dizziness or syncope.  She has had no chest pain or pressure.  She has no orthopnea or PND.  She is taking her medications as directed without  difficulty.  She has had no fevers, chills or cough.    Past Medical History:   Diagnosis Date   • Chronic rhinitis 10/15/2019   • Hyperlipidemia    • Hypertension    • Left thyroid nodule 2016   • Sleep apnea    • Type 2 diabetes mellitus without complication, without long-term current use of insulin (CMS/[facility]) 2019         Past Surgical History:   Procedure Laterality Date   • APPENDECTOMY  1971   •  SECTION  1998       Current Outpatient Medications on File Prior to Visit   Medication Sig Dispense Refill   • atorvastatin (LIPITOR) 20 MG tablet TAKE 1 TABLET BY MOUTH EVERY DAY 90 tablet 1   • clobetasol (TEMOVATE) 0.05 % cream Apply  topically to the appropriate area as directed 2 (Two) Times a Day. 60 g 1   • fluticasone (FLONASE) 50 MCG/ACT nasal spray 2 sprays into each nostril Daily. 1 each 11   • Loratadine (CLARITIN PO) Take 1 tablet by mouth Daily.     • metFORMIN (GLUCOPHAGE) 500 MG tablet TAKE 2 TABLETS BY MOUTH TWICE DAILY 360 tablet 3   • metoprolol succinate XL (TOPROL-XL) 100 MG 24 hr tablet TAKE 1.5 TABLETS BY MOUTH ONCE NIGHTLY 135 tablet 0   • valsartan-hydrochlorothiazide (DIOVAN-HCT) 160-12.5 MG per tablet TAKE 1 TABLET BY MOUTH EVERY DAY 90 tablet 2   • vitamin D (ERGOCALCIFEROL) 1.25 MG (10984 UT) capsule capsule TAKE ONE CAPSULE BY MOUTH ONE TIME PER WEEK 15 capsule 3   • XARELTO 20 MG tablet TAKE 1 TABLET BY MOUTH EVERY DAY 90 tablet 2     No current facility-administered medications on file prior to visit.        Social History     Socioeconomic History   • Marital status:      Spouse name: Not on file   • Number of children: Not on file   • Years of education: Not on file   • Highest education level: Not on file   Tobacco Use   • Smoking status: Never Smoker   • Smokeless tobacco: Never Used   Substance and Sexual Activity   • Alcohol use: Not Currently     Comment: Caffeine use: None   • Drug use: No   • Sexual activity: Never           Review of Systems  "  Constitution: Negative.   HENT: Negative for congestion.    Eyes: Negative for vision loss in left eye and vision loss in right eye.   Respiratory: Negative.  Negative for cough, hemoptysis, shortness of breath, sleep disturbances due to breathing, snoring, sputum production and wheezing.    Endocrine: Negative.    Hematologic/Lymphatic: Negative.    Skin: Negative for poor wound healing and rash.   Musculoskeletal: Negative for falls, gout, muscle cramps and myalgias.   Gastrointestinal: Negative for abdominal pain, diarrhea, dysphagia, hematemesis, melena, nausea and vomiting.   Neurological: Negative for excessive daytime sleepiness, dizziness, headaches, light-headedness, loss of balance, seizures and vertigo.   Psychiatric/Behavioral: Negative for depression and substance abuse. The patient is not nervous/anxious.        Procedures    ECG 12 Lead    Date/Time: 12/21/2020 11:42 AM  Performed by: Mayte Vu MD  Authorized by: Mayte Vu MD   Comparison: compared with previous ECG   Comparison to previous ECG: Now a fib  Rhythm: atrial fibrillation  T flattening: all    Clinical impression: abnormal EKG                Objective:      Vitals:    12/21/20 1126   BP: 142/80   BP Location: Right arm   Pulse: 90   Weight: 102 kg (225 lb 14.4 oz)   Height: 172.7 cm (68\")     Body mass index is 34.35 kg/m².    Vitals signs reviewed.   Constitutional:       General: Not in acute distress.     Appearance: Well-developed. Not diaphoretic.   Eyes:      General: No scleral icterus.     Conjunctiva/sclera: Conjunctivae normal.   HENT:      Head: Normocephalic and atraumatic.   Neck:      Musculoskeletal: Neck supple.      Thyroid: No thyromegaly.      Vascular: No carotid bruit or JVD.      Lymphadenopathy: No cervical adenopathy.   Pulmonary:      Effort: Pulmonary effort is normal. No respiratory distress.      Breath sounds: Normal breath sounds. No wheezing. No rhonchi. No rales.   Chest:      Chest " wall: Not tender to palpatation.   Cardiovascular:      Normal rate. Irregularly irregular rhythm.      Murmurs: There is no murmur.      No gallop.   Pulses:     Intact distal pulses.   Edema:     Peripheral edema absent.   Abdominal:      General: Bowel sounds are normal. There is no distension or abdominal bruit.      Palpations: Abdomen is soft. There is no abdominal mass.      Tenderness: There is no abdominal tenderness.   Musculoskeletal:         General: No deformity.      Extremities: No clubbing present.  Skin:     General: Skin is warm and dry. There is no cyanosis.      Coloration: Skin is not pale.      Findings: No rash.   Neurological:      Mental Status: Alert and oriented to person, place, and time.      Cranial Nerves: No cranial nerve deficit.   Psychiatric:         Judgment: Judgment normal.         Lab Review:       Assessment:      Diagnosis Plan   1. Essential hypertension     2. Mixed hyperlipidemia     3. Paroxysmal atrial fibrillation (CMS/[facility])     4. Type 2 diabetes mellitus without complication, without long-term current use of insulin (CMS/[facility])          1. Paroxysmal atrial fibrillation, on Xarelto 20 mg daily, increase Toprol-XL to 200 mg nightly.  2. Hypertension. Controlled.   3. Hyperlipidemia. On Atorvastatin.,   4. Obstructive sleep apnea. On CPAP.   5. Recurrent right ear infections.   6. Obesity and sedentary lifestyle.   7. Family history of coronary artery of coronary artery disease in her mother.   8.  Diabetes mellitus type 2.     Plan:       See Donell in 1 year, no other testing at this time.

## 2021-01-06 DIAGNOSIS — E78.5 HYPERLIPIDEMIA, UNSPECIFIED HYPERLIPIDEMIA TYPE: ICD-10-CM

## 2021-01-06 DIAGNOSIS — E11.9 TYPE 2 DIABETES MELLITUS WITHOUT COMPLICATION, WITHOUT LONG-TERM CURRENT USE OF INSULIN (HCC): ICD-10-CM

## 2021-01-07 RX ORDER — VALSARTAN AND HYDROCHLOROTHIAZIDE 160; 12.5 MG/1; MG/1
TABLET, FILM COATED ORAL
Qty: 90 TABLET | Refills: 3 | Status: SHIPPED | OUTPATIENT
Start: 2021-01-07 | End: 2022-01-24

## 2021-01-07 RX ORDER — ATORVASTATIN CALCIUM 20 MG/1
TABLET, FILM COATED ORAL
Qty: 30 TABLET | Refills: 0 | Status: SHIPPED | OUTPATIENT
Start: 2021-01-07 | End: 2021-01-18

## 2021-01-07 RX ORDER — RIVAROXABAN 20 MG/1
TABLET, FILM COATED ORAL
Qty: 90 TABLET | Refills: 3 | Status: SHIPPED | OUTPATIENT
Start: 2021-01-07 | End: 2022-01-24

## 2021-01-07 NOTE — TELEPHONE ENCOUNTER
Please call pt. I have refilled for 30 days. I have not seen her since last March. We need to get her on the schedule for fasting labs and CPE.

## 2021-01-15 DIAGNOSIS — E78.5 HYPERLIPIDEMIA, UNSPECIFIED HYPERLIPIDEMIA TYPE: ICD-10-CM

## 2021-01-18 RX ORDER — ATORVASTATIN CALCIUM 20 MG/1
TABLET, FILM COATED ORAL
Qty: 90 TABLET | Refills: 1 | Status: SHIPPED | OUTPATIENT
Start: 2021-01-18 | End: 2021-02-10 | Stop reason: SDUPTHER

## 2021-02-03 RX ORDER — METOPROLOL SUCCINATE 100 MG/1
TABLET, EXTENDED RELEASE ORAL
Qty: 135 TABLET | Refills: 0 | OUTPATIENT
Start: 2021-02-03

## 2021-02-10 DIAGNOSIS — E78.5 HYPERLIPIDEMIA, UNSPECIFIED HYPERLIPIDEMIA TYPE: ICD-10-CM

## 2021-02-11 RX ORDER — ATORVASTATIN CALCIUM 20 MG/1
20 TABLET, FILM COATED ORAL DAILY
Qty: 30 TABLET | Refills: 0 | Status: SHIPPED | OUTPATIENT
Start: 2021-02-11 | End: 2021-03-08

## 2021-02-21 DIAGNOSIS — E11.9 TYPE 2 DIABETES MELLITUS WITHOUT COMPLICATION, WITHOUT LONG-TERM CURRENT USE OF INSULIN (HCC): ICD-10-CM

## 2021-02-22 NOTE — TELEPHONE ENCOUNTER
Please call patient. She has not had OV since March and no labs in over one yr. Will approve 30 day RF for now but will not be able further refills. Please schedule fasting labs and CPE.

## 2021-03-06 DIAGNOSIS — E78.5 HYPERLIPIDEMIA, UNSPECIFIED HYPERLIPIDEMIA TYPE: ICD-10-CM

## 2021-03-08 ENCOUNTER — TELEPHONE (OUTPATIENT)
Dept: INTERNAL MEDICINE | Facility: CLINIC | Age: 61
End: 2021-03-08

## 2021-03-08 RX ORDER — ATORVASTATIN CALCIUM 20 MG/1
TABLET, FILM COATED ORAL
Qty: 14 TABLET | Refills: 0 | Status: SHIPPED | OUTPATIENT
Start: 2021-03-08 | End: 2022-01-22

## 2021-03-08 NOTE — TELEPHONE ENCOUNTER
LM requesting patient contact office to schedule a physical appt with PCP and fasting labs the week prior to physical.  Left info on message that a 14-day fill had been approved but cannot fill again appts are set.

## 2021-03-08 NOTE — TELEPHONE ENCOUNTER
I approved a 30 day supply last month w/ message to MA to get pt scheduled for fasting labs and CPE. I do not see that pt is schedule for either. I am approving a 14 day supply. This will be the last refill I can approve w/ fasting labs and CPE. Please call pt to schedule. Julissa, I am copying you just as an FYI.

## 2021-04-03 DIAGNOSIS — E11.9 TYPE 2 DIABETES MELLITUS WITHOUT COMPLICATION, WITHOUT LONG-TERM CURRENT USE OF INSULIN (HCC): ICD-10-CM

## 2021-05-04 RX ORDER — ERGOCALCIFEROL 1.25 MG/1
CAPSULE ORAL
Qty: 12 CAPSULE | Refills: 4 | Status: SHIPPED | OUTPATIENT
Start: 2021-05-04 | End: 2022-07-21

## 2021-05-06 LAB
25(OH)D3+25(OH)D2 SERPL-MCNC: 53.3 NG/ML (ref 30–100)
ALBUMIN SERPL-MCNC: 4.4 G/DL (ref 3.5–5.2)
ALBUMIN/GLOB SERPL: 1.6 G/DL
ALP SERPL-CCNC: 75 U/L (ref 39–117)
ALT SERPL-CCNC: 17 U/L (ref 1–33)
AST SERPL-CCNC: 21 U/L (ref 1–32)
BILIRUB SERPL-MCNC: 0.9 MG/DL (ref 0–1.2)
BUN SERPL-MCNC: 17 MG/DL (ref 8–23)
BUN/CREAT SERPL: 17.7 (ref 7–25)
CALCIUM SERPL-MCNC: 10.4 MG/DL (ref 8.6–10.5)
CHLORIDE SERPL-SCNC: 99 MMOL/L (ref 98–107)
CHOLEST SERPL-MCNC: 203 MG/DL (ref 0–200)
CHOLEST/HDLC SERPL: 3.69 {RATIO}
CO2 SERPL-SCNC: 27.2 MMOL/L (ref 22–29)
CREAT SERPL-MCNC: 0.96 MG/DL (ref 0.57–1)
GLOBULIN SER CALC-MCNC: 2.8 GM/DL
GLUCOSE SERPL-MCNC: 117 MG/DL (ref 65–99)
HBA1C MFR BLD: 6.1 % (ref 4.8–5.6)
HDLC SERPL-MCNC: 55 MG/DL (ref 40–60)
LDLC SERPL CALC-MCNC: 114 MG/DL (ref 0–100)
POTASSIUM SERPL-SCNC: 4.4 MMOL/L (ref 3.5–5.2)
PROT SERPL-MCNC: 7.2 G/DL (ref 6–8.5)
SODIUM SERPL-SCNC: 138 MMOL/L (ref 136–145)
TRIGL SERPL-MCNC: 196 MG/DL (ref 0–150)
VLDLC SERPL CALC-MCNC: 34 MG/DL (ref 5–40)

## 2021-05-11 DIAGNOSIS — E11.9 TYPE 2 DIABETES MELLITUS WITHOUT COMPLICATION, WITHOUT LONG-TERM CURRENT USE OF INSULIN (HCC): ICD-10-CM

## 2021-05-14 ENCOUNTER — OFFICE VISIT (OUTPATIENT)
Dept: INTERNAL MEDICINE | Facility: CLINIC | Age: 61
End: 2021-05-14

## 2021-05-14 VITALS
OXYGEN SATURATION: 99 % | DIASTOLIC BLOOD PRESSURE: 66 MMHG | BODY MASS INDEX: 34.1 KG/M2 | HEIGHT: 68 IN | TEMPERATURE: 98.6 F | WEIGHT: 225 LBS | HEART RATE: 70 BPM | SYSTOLIC BLOOD PRESSURE: 108 MMHG | RESPIRATION RATE: 16 BRPM

## 2021-05-14 DIAGNOSIS — E11.9 TYPE 2 DIABETES MELLITUS WITHOUT COMPLICATION, WITHOUT LONG-TERM CURRENT USE OF INSULIN (HCC): ICD-10-CM

## 2021-05-14 DIAGNOSIS — Z12.4 ENCOUNTER FOR PAPANICOLAOU SMEAR FOR CERVICAL CANCER SCREENING: ICD-10-CM

## 2021-05-14 DIAGNOSIS — I10 ESSENTIAL HYPERTENSION: ICD-10-CM

## 2021-05-14 DIAGNOSIS — G89.29 CHRONIC RIGHT HIP PAIN: ICD-10-CM

## 2021-05-14 DIAGNOSIS — Z12.31 ENCOUNTER FOR SCREENING MAMMOGRAM FOR MALIGNANT NEOPLASM OF BREAST: ICD-10-CM

## 2021-05-14 DIAGNOSIS — Z00.00 ENCOUNTER FOR WELLNESS EXAMINATION IN ADULT: Primary | ICD-10-CM

## 2021-05-14 DIAGNOSIS — Z12.11 SCREENING FOR MALIGNANT NEOPLASM OF COLON: ICD-10-CM

## 2021-05-14 DIAGNOSIS — I48.0 PAROXYSMAL ATRIAL FIBRILLATION (HCC): ICD-10-CM

## 2021-05-14 DIAGNOSIS — E55.9 VITAMIN D DEFICIENCY: ICD-10-CM

## 2021-05-14 DIAGNOSIS — R20.0 NUMBNESS OF RIGHT LOWER EXTREMITY: ICD-10-CM

## 2021-05-14 DIAGNOSIS — E78.2 MIXED HYPERLIPIDEMIA: ICD-10-CM

## 2021-05-14 DIAGNOSIS — M25.551 CHRONIC RIGHT HIP PAIN: ICD-10-CM

## 2021-05-14 LAB
BILIRUB BLD-MCNC: NEGATIVE MG/DL
CLARITY, POC: ABNORMAL
COLOR UR: YELLOW
GLUCOSE UR STRIP-MCNC: NEGATIVE MG/DL
KETONES UR QL: NEGATIVE
LEUKOCYTE EST, POC: NEGATIVE
NITRITE UR-MCNC: NEGATIVE MG/ML
PH UR: 6 [PH] (ref 5–8)
POC CREATININE URINE: 200
POC MICROALBUMIN URINE: 10
PROT UR STRIP-MCNC: NEGATIVE MG/DL
RBC # UR STRIP: ABNORMAL /UL
SP GR UR: 1.01 (ref 1–1.03)
UROBILINOGEN UR QL: NORMAL

## 2021-05-14 PROCEDURE — 81003 URINALYSIS AUTO W/O SCOPE: CPT | Performed by: NURSE PRACTITIONER

## 2021-05-14 PROCEDURE — 82044 UR ALBUMIN SEMIQUANTITATIVE: CPT | Performed by: NURSE PRACTITIONER

## 2021-05-14 PROCEDURE — 99396 PREV VISIT EST AGE 40-64: CPT | Performed by: NURSE PRACTITIONER

## 2021-05-14 PROCEDURE — 99213 OFFICE O/P EST LOW 20 MIN: CPT | Performed by: NURSE PRACTITIONER

## 2021-05-14 NOTE — PROGRESS NOTES
NEGRA Taylor is a 60 y.o. female presenting for Annual Exam, Hypertension, Hyperlipidemia, Atrial Fibrillation, Diabetes, and Vitamin D Deficiency    Her current/chronic health conditions include:  Patient Active Problem List   Diagnosis   • HTN (hypertension)   • HLD (hyperlipidemia)   • Left thyroid nodule   • Paroxysmal atrial fibrillation (CMS/HCC)   • Goiter   • Vitamin D deficiency   • Sleep apnea   • Chronic rhinitis   • Type 2 diabetes mellitus without complication, without long-term current use of insulin (CMS/HCC)       Current Outpatient Medications on File Prior to Visit   Medication Sig   • atorvastatin (LIPITOR) 20 MG tablet TAKE 1 TABLET BY MOUTH EVERY DAY   • clobetasol (TEMOVATE) 0.05 % cream Apply  topically to the appropriate area as directed 2 (Two) Times a Day.   • fluticasone (FLONASE) 50 MCG/ACT nasal spray 2 sprays into each nostril Daily.   • Loratadine (CLARITIN PO) Take 1 tablet by mouth Daily.   • metFORMIN (GLUCOPHAGE) 500 MG tablet TAKE 2 TABLETS BY MOUTH TWICE A DAY   • metoprolol succinate XL (TOPROL-XL) 200 MG 24 hr tablet Take 1 tablet by mouth Every Night.   • valsartan-hydrochlorothiazide (DIOVAN-HCT) 160-12.5 MG per tablet TAKE 1 TABLET BY MOUTH EVERY DAY   • vitamin D (ERGOCALCIFEROL) 1.25 MG (25261 UT) capsule capsule TAKE 1 CAPSULE BY MOUTH ONE TIME PER WEEK   • Xarelto 20 MG tablet TAKE 1 TABLET BY MOUTH EVERY DAY     No current facility-administered medications on file prior to visit.     I last saw  Ms. Gage 03/20/2020. She has been strictly quarantining due to COVID-19 pandemic.    Afib - this is f/b Dr. Harris. She is UTD to f/u. She is consistent w/ Xarelto.    HTN - she is currently taking Diovan and Toprol. She does not self monitor. Denies CP, palpitations, SOB, HA, or dizziness.    HLD - she continues to take Lipitor and tolerates this well.    DM - She does not self monitor. She continues to take Metformin.    Vit D def - she takes weekly supp    Health  "Habits:  Nutrition:  Exercise: 2 times/week.  Current exercise activities include: walking    Screenings:  Dental: UTD  Eye Exam: She sts \"I need to make an appt for that.\"  PAP: today  Mammogram: will order today  Dexa: n/a  Colon Cancer: last CLS was 20 years ago, no polyps, prevalent FH of polyps  Tob use: never      Complaints today include:  Pt notes R hip pain. This has been going on for approx 2 years. She notes an alteration in her gait. She has never had x-rays. She takes Ibu or Tylenol PRN. There is intermittent numbness throughout her RLE      The following portions of the patient's history were reviewed and updated as appropriate: allergies, current medications, problem list, past medical history, past family history, past medical history, and past social history.    Review of Systems   Constitutional: Negative.    HENT: Negative.    Eyes: Negative.    Respiratory: Negative.    Cardiovascular: Negative.    Gastrointestinal: Negative.    Endocrine: Negative.    Genitourinary: Negative.    Musculoskeletal: Negative.    Skin: Negative.    Allergic/Immunologic: Negative.    Neurological: Negative.    Hematological: Negative.    Psychiatric/Behavioral: Negative.        OBJECTIVE    Vitals:    05/14/21 1023   BP: 108/66   BP Location: Left arm   Patient Position: Sitting   Cuff Size: Adult   Pulse: 70   Resp: 16   Temp: 98.6 °F (37 °C)   TempSrc: Temporal   SpO2: 99%   Weight: 102 kg (225 lb)   Height: 172.7 cm (68\")       BP Readings from Last 3 Encounters:   05/14/21 108/66   12/21/20 142/80   03/10/20 137/62        Wt Readings from Last 3 Encounters:   05/14/21 102 kg (225 lb)   12/21/20 102 kg (225 lb 14.4 oz)   03/10/20 102 kg (225 lb)        Body mass index is 34.21 kg/m².  Nursing notes and vital signs reviewed.      Physical Exam  Constitutional:       General: She is not in acute distress.     Appearance: Normal appearance. She is well-developed.   HENT:      Head: Normocephalic.      Right Ear: " Hearing, tympanic membrane, ear canal and external ear normal.      Left Ear: Hearing, tympanic membrane, ear canal and external ear normal.      Nose: Nose normal. No mucosal edema or rhinorrhea.      Mouth/Throat:      Mouth: Mucous membranes are moist.      Pharynx: Oropharynx is clear. Uvula midline.   Eyes:      General: Lids are normal.      Extraocular Movements: Extraocular movements intact.      Conjunctiva/sclera: Conjunctivae normal.      Pupils: Pupils are equal, round, and reactive to light.   Neck:      Thyroid: No thyroid mass or thyromegaly.   Cardiovascular:      Rate and Rhythm: Rhythm irregular.      Pulses: Normal pulses.      Heart sounds: S1 normal and S2 normal. No murmur heard.   No friction rub. No gallop.    Pulmonary:      Effort: Pulmonary effort is normal.      Breath sounds: Normal breath sounds. No wheezing, rhonchi or rales.   Chest:      Breasts: Breasts are symmetrical.         Right: Normal.         Left: Normal.   Abdominal:      General: Bowel sounds are normal.      Palpations: Abdomen is soft.      Tenderness: There is no abdominal tenderness. There is no guarding.      Hernia: No hernia is present. There is no hernia in the left inguinal area or right inguinal area.   Genitourinary:     Pubic Area: No rash.       Labia:         Right: No rash, tenderness or lesion.         Left: No rash, tenderness or lesion.       Vagina: Normal.      Cervix: Normal.      Uterus: Normal.       Adnexa: Right adnexa normal and left adnexa normal.   Musculoskeletal:         General: No deformity. Normal range of motion.      Cervical back: Normal range of motion and neck supple.   Lymphadenopathy:      Cervical: No cervical adenopathy.      Upper Body:      Right upper body: No supraclavicular, axillary or pectoral adenopathy.      Left upper body: No supraclavicular, axillary or pectoral adenopathy.      Lower Body: No right inguinal adenopathy. No left inguinal adenopathy.   Skin:     General:  Skin is warm and dry.      Findings: No lesion or rash.   Neurological:      General: No focal deficit present.      Mental Status: She is alert and oriented to person, place, and time.      Cranial Nerves: No cranial nerve deficit.      Sensory: No sensory deficit.      Motor: Motor function is intact.      Coordination: Coordination is intact.      Gait: Gait normal.      Deep Tendon Reflexes: Reflexes are normal and symmetric.   Psychiatric:         Attention and Perception: She is attentive.         Mood and Affect: Mood and affect normal.         Speech: Speech normal.         Behavior: Behavior normal.         Thought Content: Thought content normal.         Recent Results (from the past 672 hour(s))   Comprehensive Metabolic Panel    Collection Time: 05/06/21  8:52 AM   Result Value Ref Range    Glucose 117 (H) 65 - 99 mg/dL    BUN 17 8 - 23 mg/dL    Creatinine 0.96 0.57 - 1.00 mg/dL    eGFR Non African Am 59 (L) >60 mL/min/1.73    eGFR African Am 72 >60 mL/min/1.73    BUN/Creatinine Ratio 17.7 7.0 - 25.0    Sodium 138 136 - 145 mmol/L    Potassium 4.4 3.5 - 5.2 mmol/L    Chloride 99 98 - 107 mmol/L    Total CO2 27.2 22.0 - 29.0 mmol/L    Calcium 10.4 8.6 - 10.5 mg/dL    Total Protein 7.2 6.0 - 8.5 g/dL    Albumin 4.40 3.50 - 5.20 g/dL    Globulin 2.8 gm/dL    A/G Ratio 1.6 g/dL    Total Bilirubin 0.9 0.0 - 1.2 mg/dL    Alkaline Phosphatase 75 39 - 117 U/L    AST (SGOT) 21 1 - 32 U/L    ALT (SGPT) 17 1 - 33 U/L   Lipid Panel With / Chol / HDL Ratio    Collection Time: 05/06/21  8:52 AM   Result Value Ref Range    Total Cholesterol 203 (H) 0 - 200 mg/dL    Triglycerides 196 (H) 0 - 150 mg/dL    HDL Cholesterol 55 40 - 60 mg/dL    VLDL Cholesterol Jean 34 5 - 40 mg/dL    LDL Chol Calc (NIH) 114 (H) 0 - 100 mg/dL    Chol/HDL Ratio 3.69    Hemoglobin A1c    Collection Time: 05/06/21  8:52 AM   Result Value Ref Range    Hemoglobin A1C 6.10 (H) 4.80 - 5.60 %   Vitamin D 25 Hydroxy    Collection Time: 05/06/21  8:52  AM   Result Value Ref Range    25 Hydroxy, Vitamin D 53.3 30.0 - 100.0 ng/ml   POCT urinalysis dipstick, automated    Collection Time: 05/14/21 10:57 AM    Specimen: Urine   Result Value Ref Range    Color Yellow Yellow, Straw, Dark Yellow, Mireya    Clarity, UA Cloudy (A) Clear    Specific Gravity  1.015 1.005 - 1.030    pH, Urine 6.0 5.0 - 8.0    Leukocytes Negative Negative    Nitrite, UA Negative Negative    Protein, POC Negative Negative mg/dL    Glucose, UA Negative Negative, 1000 mg/dL (3+) mg/dL    Ketones, UA Negative Negative    Urobilinogen, UA Normal Normal    Bilirubin Negative Negative    Blood, UA Small (A) Negative   POCT microalbumin    Collection Time: 05/14/21 10:58 AM    Specimen: Urine   Result Value Ref Range    Microalbumin, Urine 10     Creatinine, Urine 200      Each of these results were discussed individually in detail with the patient.      ASSESSMENT AND PLAN    Diagnoses and all orders for this visit:    1. Encounter for wellness examination in adult (Primary)  -     POCT urinalysis dipstick, automated  -     POCT microalbumin    2. Encounter for Papanicolaou smear for cervical cancer screening  -     IgP, Aptima HPV; Future    3. Encounter for screening mammogram for malignant neoplasm of breast  -     Mammo Screening Digital Tomosynthesis Bilateral With CAD    4. Screening for malignant neoplasm of colon  -     Ambulatory Referral to Gastroenterology    5. Paroxysmal atrial fibrillation (CMS/HCC)  Comments:  - controlled and anticoagulated    6. Essential hypertension  Comments:  - controlled  Orders:  -     Comprehensive Metabolic Panel; Future    7. Mixed hyperlipidemia  Comments:  - uncontrolled  - focus on TLCs  Orders:  -     Comprehensive Metabolic Panel; Future  -     Lipid Panel With / Chol / HDL Ratio; Future    8. Type 2 diabetes mellitus without complication, without long-term current use of insulin (CMS/HCC)  Comments:  - controlled w/ A1c of 6.1  Orders:  -     POCT  urinalysis dipstick, automated  -     POCT microalbumin  -     Comprehensive Metabolic Panel; Future  -     Hemoglobin A1c; Future    9. Vitamin D deficiency  Comments:  - controlled    10. Chronic right hip pain  -     XR Hip With or Without Pelvis 2 - 3 View Right  -     Ambulatory Referral to Physical Therapy    11. Numbness of right lower extremity  -     XR Spine Lumbar 4+ View  -     Ambulatory Referral to Physical Therapy        Preventative counseling completed including relevant screenings, appropriate vaccinations, healthy nutrition, and appropriate physical activity.  Patient's Body mass index is 34.21 kg/m². indicating that she is obese (BMI >30). Obesity-related health conditions include the following: hypertension, diabetes mellitus and dyslipidemias. Obesity is unchanged. BMI is is above average; BMI management plan is completed. We discussed portion control and increasing exercise..      Except as noted above, pt will continue current medications as noted in the medication list. I will continue to authorize refills as needed.      Medications, including side effects, were discussed with the patient. Patient verbalized understanding.  The plan of care was discussed. All questions were answered. Patient verbalized understanding.        Return in about 3 months (around 8/14/2021) for w/ fasting labs prior to f/u., or sooner if needed.

## 2021-05-19 LAB
CYTOLOGIST CVX/VAG CYTO: NORMAL
CYTOLOGY CVX/VAG DOC CYTO: NORMAL
CYTOLOGY CVX/VAG DOC THIN PREP: NORMAL
DX ICD CODE: NORMAL
HIV 1 & 2 AB SER-IMP: NORMAL
HPV I/H RISK 4 DNA CVX QL PROBE+SIG AMP: NEGATIVE
OTHER STN SPEC: NORMAL
PATHOLOGIST CVX/VAG CYTO: NORMAL
STAT OF ADQ CVX/VAG CYTO-IMP: NORMAL

## 2021-05-26 ENCOUNTER — HOSPITAL ENCOUNTER (OUTPATIENT)
Dept: GENERAL RADIOLOGY | Facility: HOSPITAL | Age: 61
Discharge: HOME | End: 2021-05-26

## 2021-05-26 ENCOUNTER — HOSPITAL ENCOUNTER (OUTPATIENT)
Dept: MAMMOGRAPHY | Facility: HOSPITAL | Age: 61
Discharge: HOME | End: 2021-05-26

## 2021-05-26 PROCEDURE — 77067 SCR MAMMO BI INCL CAD: CPT

## 2021-05-26 PROCEDURE — 73502 X-RAY EXAM HIP UNI 2-3 VIEWS: CPT

## 2021-05-26 PROCEDURE — 77063 BREAST TOMOSYNTHESIS BI: CPT

## 2021-05-26 PROCEDURE — 72110 X-RAY EXAM L-2 SPINE 4/>VWS: CPT

## 2021-06-02 ENCOUNTER — APPOINTMENT (OUTPATIENT)
Dept: PHYSICAL THERAPY | Facility: HOSPITAL | Age: 61
End: 2021-06-02

## 2021-06-04 DIAGNOSIS — E11.9 TYPE 2 DIABETES MELLITUS WITHOUT COMPLICATION, WITHOUT LONG-TERM CURRENT USE OF INSULIN (HCC): ICD-10-CM

## 2021-07-02 ENCOUNTER — OFFICE VISIT (OUTPATIENT)
Dept: INTERNAL MEDICINE | Facility: CLINIC | Age: 61
End: 2021-07-02

## 2021-07-02 VITALS
SYSTOLIC BLOOD PRESSURE: 120 MMHG | TEMPERATURE: 96.6 F | OXYGEN SATURATION: 99 % | WEIGHT: 215.6 LBS | HEIGHT: 68 IN | HEART RATE: 73 BPM | BODY MASS INDEX: 32.67 KG/M2 | RESPIRATION RATE: 18 BRPM | DIASTOLIC BLOOD PRESSURE: 76 MMHG

## 2021-07-02 DIAGNOSIS — R19.7 DIARRHEA OF PRESUMED INFECTIOUS ORIGIN: Primary | ICD-10-CM

## 2021-07-02 DIAGNOSIS — R11.0 NAUSEA: ICD-10-CM

## 2021-07-02 PROCEDURE — 99214 OFFICE O/P EST MOD 30 MIN: CPT | Performed by: INTERNAL MEDICINE

## 2021-07-02 RX ORDER — ONDANSETRON 8 MG/1
8 TABLET, ORALLY DISINTEGRATING ORAL EVERY 8 HOURS PRN
Qty: 20 TABLET | Refills: 0 | Status: SHIPPED | OUTPATIENT
Start: 2021-07-02 | End: 2022-01-26

## 2021-07-02 NOTE — PROGRESS NOTES
Claudia Gage is a 60 y.o. female, who presents with a chief complaint of   Chief Complaint   Patient presents with   • Diarrhea     x week    • Abdominal Pain     x week            HPI   Pt has been having diarrhea since the end of her vacation.  She has now been having sx about 8 days.  Eating or drinking makes sx worse.  No fever.  No abd pain.  No hx diverticulitis.  She has not had a c-scope.  No past abd imaging.  No new meds.  She has bene on metformin for over a year.  No recent antibiotics.  She has had some nausea but no emesis.  She took some imodium yesterday.  bm's are loose/watery.  No blood in stool.  She is trying to stay well hydrated.        The following portions of the patient's history were reviewed and updated as appropriate: allergies, current medications, past family history, past medical history, past social history, past surgical history and problem list.    Allergies: Latex, Lisinopril, and Sulfa antibiotics    Review of Systems   Constitutional: Negative for fatigue and fever.   HENT: Negative.    Eyes: Negative.    Respiratory: Negative.  Negative for cough.    Cardiovascular: Negative for leg swelling.   Gastrointestinal: Positive for diarrhea and nausea. Negative for abdominal pain and blood in stool.   Endocrine: Negative.    Genitourinary: Negative.    Musculoskeletal: Negative.    Skin: Negative.    Allergic/Immunologic: Negative.    Neurological: Negative for dizziness.   Hematological: Negative.    Psychiatric/Behavioral: Negative.    All other systems reviewed and are negative.            Wt Readings from Last 3 Encounters:   07/02/21 97.8 kg (215 lb 9.6 oz)   05/14/21 102 kg (225 lb)   12/21/20 102 kg (225 lb 14.4 oz)     Temp Readings from Last 3 Encounters:   07/02/21 96.6 °F (35.9 °C) (Temporal)   05/14/21 98.6 °F (37 °C) (Temporal)   03/10/20 98.2 °F (36.8 °C) (Oral)     BP Readings from Last 3 Encounters:   07/02/21 120/76   05/14/21 108/66   12/21/20 142/80     Pulse  Readings from Last 3 Encounters:   07/02/21 73   05/14/21 70   12/21/20 90     Body mass index is 32.79 kg/m².  SpO2 Readings from Last 3 Encounters:   07/02/21 99%   05/14/21 99%   03/10/20 100%          Physical Exam  Vitals and nursing note reviewed.   Constitutional:       General: She is not in acute distress.     Appearance: She is well-developed.   HENT:      Head: Normocephalic and atraumatic.      Right Ear: External ear normal.      Left Ear: External ear normal.      Nose: Nose normal.   Eyes:      Conjunctiva/sclera: Conjunctivae normal.      Pupils: Pupils are equal, round, and reactive to light.   Cardiovascular:      Rate and Rhythm: Normal rate and regular rhythm.      Heart sounds: Normal heart sounds.   Pulmonary:      Effort: Pulmonary effort is normal. No respiratory distress.      Breath sounds: Normal breath sounds. No wheezing.   Abdominal:      General: There is no distension.      Palpations: Abdomen is soft. There is no mass.      Tenderness: There is no abdominal tenderness.   Musculoskeletal:         General: Normal range of motion.      Cervical back: Normal range of motion and neck supple.      Comments: Normal gait   Skin:     General: Skin is warm and dry.   Neurological:      Mental Status: She is alert and oriented to person, place, and time.   Psychiatric:         Behavior: Behavior normal.         Thought Content: Thought content normal.         Judgment: Judgment normal.         Results for orders placed or performed in visit on 05/14/21   POCT urinalysis dipstick, automated    Specimen: Urine   Result Value Ref Range    Color Yellow Yellow, Straw, Dark Yellow, Mireya    Clarity, UA Cloudy (A) Clear    Specific Gravity  1.015 1.005 - 1.030    pH, Urine 6.0 5.0 - 8.0    Leukocytes Negative Negative    Nitrite, UA Negative Negative    Protein, POC Negative Negative mg/dL    Glucose, UA Negative Negative, 1000 mg/dL (3+) mg/dL    Ketones, UA Negative Negative    Urobilinogen, UA  Normal Normal    Bilirubin Negative Negative    Blood, UA Small (A) Negative   POCT microalbumin    Specimen: Urine   Result Value Ref Range    Microalbumin, Urine 10     Creatinine, Urine 200    IgP, Aptima HPV    Specimen: Cervix; ThinPrep Vial   Result Value Ref Range    Diagnosis Comment     Specimen adequacy: Comment     Clinician Provided ICD-10: Comment     Performed by: Comment     Electronically signed by: Comment     . .     Note: Comment     Method: Comment     HPV Aptima Negative Negative     Result Review :                  Assessment and Plan    Diagnoses and all orders for this visit:    1. Diarrhea of presumed infectious origin (Primary)  -     Clostridium Difficile Toxin, PCR - Stool, Per Rectum  -     Stool Culture (Reference Lab) - Stool, Per Rectum; Future  -     Giardia / Cryptosporidium Screen - Stool, Per Rectum; Future    2. Nausea  -     ondansetron ODT (ZOFRAN-ODT) 8 MG disintegrating tablet; Place 1 tablet on the tongue Every 8 (Eight) Hours As Needed for Nausea or Vomiting.  Dispense: 20 tablet; Refill: 0                  Outpatient Medications Prior to Visit   Medication Sig Dispense Refill   • atorvastatin (LIPITOR) 20 MG tablet TAKE 1 TABLET BY MOUTH EVERY DAY 14 tablet 0   • clobetasol (TEMOVATE) 0.05 % cream Apply  topically to the appropriate area as directed 2 (Two) Times a Day. 60 g 1   • fluticasone (FLONASE) 50 MCG/ACT nasal spray 2 sprays into each nostril Daily. 1 each 11   • Loratadine (CLARITIN PO) Take 1 tablet by mouth Daily.     • metFORMIN (GLUCOPHAGE) 500 MG tablet TAKE 2 TABLETS BY MOUTH TWICE A  tablet 2   • metoprolol succinate XL (TOPROL-XL) 200 MG 24 hr tablet Take 1 tablet by mouth Every Night. 90 tablet 3   • valsartan-hydrochlorothiazide (DIOVAN-HCT) 160-12.5 MG per tablet TAKE 1 TABLET BY MOUTH EVERY DAY 90 tablet 3   • vitamin D (ERGOCALCIFEROL) 1.25 MG (16880 UT) capsule capsule TAKE 1 CAPSULE BY MOUTH ONE TIME PER WEEK 12 capsule 4   • Xarelto 20 MG  tablet TAKE 1 TABLET BY MOUTH EVERY DAY 90 tablet 3     No facility-administered medications prior to visit.     New Medications Ordered This Visit   Medications   • ondansetron ODT (ZOFRAN-ODT) 8 MG disintegrating tablet     Sig: Place 1 tablet on the tongue Every 8 (Eight) Hours As Needed for Nausea or Vomiting.     Dispense:  20 tablet     Refill:  0     [unfilled]  There are no discontinued medications.      Return if symptoms worsen or fail to improve.    Patient was given instructions and counseling regarding her condition or for health maintenance advice. Please see specific information pulled into the AVS if appropriate.

## 2021-07-06 LAB
BACTERIA SPEC CULT: NORMAL
BACTERIA SPEC CULT: NORMAL
C DIFF TOX GENS STL QL NAA+PROBE: NEGATIVE
CAMPYLOBACTER STL CULT: NORMAL
CRYPTOSP AG STL QL IA: NEGATIVE
E COLI SXT STL QL IA: NEGATIVE
G LAMBLIA AG STL QL IA: NEGATIVE
SALM + SHIG STL CULT: NORMAL

## 2021-07-07 ENCOUNTER — TELEPHONE (OUTPATIENT)
Dept: INTERNAL MEDICINE | Facility: CLINIC | Age: 61
End: 2021-07-07

## 2021-07-07 NOTE — TELEPHONE ENCOUNTER
PATIENT IS CALLING FOR LAB RESULTS FROM 07/02/21. SHE DOES NOT HAVE ACCESS TO [facility] AT THIS TIME SO COULD YOU PLEASE CALL HER.    PLEASE ADVISE   [phone]

## 2021-07-12 ENCOUNTER — OFFICE VISIT (OUTPATIENT)
Dept: INTERNAL MEDICINE | Facility: CLINIC | Age: 61
End: 2021-07-12

## 2021-07-12 VITALS
HEIGHT: 68 IN | DIASTOLIC BLOOD PRESSURE: 70 MMHG | BODY MASS INDEX: 32.16 KG/M2 | OXYGEN SATURATION: 96 % | WEIGHT: 212.2 LBS | TEMPERATURE: 96.9 F | SYSTOLIC BLOOD PRESSURE: 120 MMHG | RESPIRATION RATE: 16 BRPM | HEART RATE: 80 BPM

## 2021-07-12 DIAGNOSIS — A09 TRAVELER'S DIARRHEA: Primary | ICD-10-CM

## 2021-07-12 PROCEDURE — 99214 OFFICE O/P EST MOD 30 MIN: CPT | Performed by: NURSE PRACTITIONER

## 2021-07-12 RX ORDER — METRONIDAZOLE 500 MG/1
500 TABLET ORAL 3 TIMES DAILY
Qty: 21 TABLET | Refills: 0 | Status: SHIPPED | OUTPATIENT
Start: 2021-07-12 | End: 2021-07-19

## 2021-07-12 RX ORDER — CIPROFLOXACIN 250 MG/1
250 TABLET, FILM COATED ORAL 2 TIMES DAILY
Qty: 14 TABLET | Refills: 0 | Status: SHIPPED | OUTPATIENT
Start: 2021-07-12 | End: 2021-07-19

## 2021-07-12 NOTE — PROGRESS NOTES
"Claudia Gage is a 60 y.o. female presenting today for   Chief Complaint   Patient presents with   • Diarrhea       Subjective    Diarrhea   This is a new problem. Episode onset: 2.5 weeks ago on the evening of 06/23. The problem occurs more than 10 times per day. The problem has been unchanged. The stool consistency is described as watery (no blood or mucous). The patient states that diarrhea awakens her from sleep. Pertinent negatives include no chills, fever or vomiting. Risk factors: began while she was traveling. She has tried anti-motility drug for the symptoms. The treatment provided no relief.        The following portions of the patient's history were reviewed and updated as appropriate: allergies, current medications, problem list, past medical history, past surgical history, family history, and social history.    Review of Systems   Constitutional: Positive for diaphoresis and fatigue. Negative for chills and fever.   Gastrointestinal: Positive for diarrhea and nausea. Negative for anal bleeding, blood in stool and vomiting.   Genitourinary: Negative for dysuria.         Objective    Vitals:    07/12/21 1547   BP: 120/70   Pulse: 80   Resp: 16   Temp: 96.9 °F (36.1 °C)   TempSrc: Temporal   SpO2: 96%   Weight: 96.3 kg (212 lb 3.2 oz)   Height: 172.7 cm (67.99\")     Body mass index is 32.27 kg/m².  Nursing notes and vitals reviewed.    Physical Exam  Constitutional:       General: She is not in acute distress.     Appearance: She is well-developed.   Cardiovascular:      Rate and Rhythm: Regular rhythm.      Heart sounds: S1 normal and S2 normal.   Pulmonary:      Effort: Pulmonary effort is normal.      Breath sounds: Normal breath sounds.   Abdominal:      General: Bowel sounds are increased.      Palpations: Abdomen is soft.   Neurological:      Mental Status: She is alert and oriented to person, place, and time.   Psychiatric:         Attention and Perception: She is attentive.         Behavior: Behavior " normal.         Thought Content: Thought content normal.       Stool Culture (Reference Lab) - , (07/02/2021 12:31)  Giardia / Cryptosporidium Screen - , (07/02/2021 12:31)  Clostridium Difficile Toxin, PCR - , (07/02/2021 12:31)      Assessment and Plan    Diagnoses and all orders for this visit:    1. Traveler's diarrhea (Primary)  Comments:  - start OTC Florastor after completion of abx  Orders:  -     ciprofloxacin (Cipro) 250 MG tablet; Take 1 tablet by mouth 2 (Two) Times a Day for 7 days.  Dispense: 14 tablet; Refill: 0  -     metroNIDAZOLE (Flagyl) 500 MG tablet; Take 1 tablet by mouth 3 (Three) Times a Day for 7 days.  Dispense: 21 tablet; Refill: 0            Medications, including side effects, were discussed with the patient. Patient verbalized understanding.  The plan of care was discussed. All questions were answered. Patient verbalized understanding.        Return if symptoms worsen or fail to improve iin 2 weeks.

## 2021-07-12 NOTE — TELEPHONE ENCOUNTER
Stool studies were neg.  Ok to try imodium and a probiotic.  Increase fiber in diet.  If sx persist pt needs to f/u with GI.

## 2021-07-26 ENCOUNTER — TELEPHONE (OUTPATIENT)
Dept: INTERNAL MEDICINE | Facility: CLINIC | Age: 61
End: 2021-07-26

## 2021-07-26 NOTE — TELEPHONE ENCOUNTER
Caller: Claudia Gage    Relationship to patient: Self    Best call back number: [phone]    Chief complaint: STILL HAVING DIARRHEA     Type of visit: OFFICE VISIT    Requested date: TODAY    If rescheduling, when is the original appointment:     Additional notes: PATIENT STATES THAT THE MEDICATION ISNT WORKING AND WOULD LIKE TO SEE ELPIDIO

## 2021-07-27 ENCOUNTER — TELEPHONE (OUTPATIENT)
Dept: GASTROENTEROLOGY | Facility: CLINIC | Age: 61
End: 2021-07-27

## 2021-07-27 ENCOUNTER — OFFICE VISIT (OUTPATIENT)
Dept: INTERNAL MEDICINE | Facility: CLINIC | Age: 61
End: 2021-07-27

## 2021-07-27 VITALS
WEIGHT: 215.8 LBS | TEMPERATURE: 98.6 F | DIASTOLIC BLOOD PRESSURE: 67 MMHG | RESPIRATION RATE: 18 BRPM | OXYGEN SATURATION: 99 % | HEIGHT: 68 IN | BODY MASS INDEX: 32.71 KG/M2 | SYSTOLIC BLOOD PRESSURE: 118 MMHG | HEART RATE: 75 BPM

## 2021-07-27 DIAGNOSIS — E04.1 LEFT THYROID NODULE: ICD-10-CM

## 2021-07-27 DIAGNOSIS — R19.7 DIARRHEA, UNSPECIFIED TYPE: Primary | ICD-10-CM

## 2021-07-27 DIAGNOSIS — E11.9 TYPE 2 DIABETES MELLITUS WITHOUT COMPLICATION, WITHOUT LONG-TERM CURRENT USE OF INSULIN (HCC): ICD-10-CM

## 2021-07-27 PROCEDURE — 99214 OFFICE O/P EST MOD 30 MIN: CPT | Performed by: NURSE PRACTITIONER

## 2021-07-27 RX ORDER — DIPHENOXYLATE HYDROCHLORIDE AND ATROPINE SULFATE 2.5; .025 MG/1; MG/1
1 TABLET ORAL 4 TIMES DAILY PRN
Qty: 60 TABLET | Refills: 0 | Status: SHIPPED | OUTPATIENT
Start: 2021-07-27 | End: 2023-01-06

## 2021-07-27 RX ORDER — PREDNISONE 10 MG/1
TABLET ORAL
Qty: 42 TABLET | Refills: 0 | Status: ON HOLD | OUTPATIENT
Start: 2021-07-27 | End: 2021-11-13

## 2021-07-27 NOTE — PROGRESS NOTES
Subjective   Claudia Gage is a 60 y.o. female presenting today for follow up of   Chief Complaint   Patient presents with   • Diarrhea     Pt is still having diarrhea, some days she is better or not as bad but then most days it is not better        History of Present Illness     Patient Active Problem List   Diagnosis   • HTN (hypertension)   • HLD (hyperlipidemia)   • Left thyroid nodule   • Paroxysmal atrial fibrillation (CMS/HCC)   • Goiter   • Vitamin D deficiency   • Sleep apnea   • Chronic rhinitis   • Type 2 diabetes mellitus without complication, without long-term current use of insulin (CMS/HCC)       Current Outpatient Medications on File Prior to Visit   Medication Sig   • atorvastatin (LIPITOR) 20 MG tablet TAKE 1 TABLET BY MOUTH EVERY DAY   • clobetasol (TEMOVATE) 0.05 % cream Apply  topically to the appropriate area as directed 2 (Two) Times a Day.   • fluticasone (FLONASE) 50 MCG/ACT nasal spray 2 sprays into each nostril Daily.   • Loratadine (CLARITIN PO) Take 1 tablet by mouth Daily.   • metFORMIN (GLUCOPHAGE) 500 MG tablet TAKE 2 TABLETS BY MOUTH TWICE A DAY   • metoprolol succinate XL (TOPROL-XL) 200 MG 24 hr tablet Take 1 tablet by mouth Every Night.   • ondansetron ODT (ZOFRAN-ODT) 8 MG disintegrating tablet Place 1 tablet on the tongue Every 8 (Eight) Hours As Needed for Nausea or Vomiting.   • valsartan-hydrochlorothiazide (DIOVAN-HCT) 160-12.5 MG per tablet TAKE 1 TABLET BY MOUTH EVERY DAY   • vitamin D (ERGOCALCIFEROL) 1.25 MG (48271 UT) capsule capsule TAKE 1 CAPSULE BY MOUTH ONE TIME PER WEEK   • Xarelto 20 MG tablet TAKE 1 TABLET BY MOUTH EVERY DAY     No current facility-administered medications on file prior to visit.      Claudia returns for f/u r/t diarrhea.    My notes from her prior visit r/t diarrhea:  Diarrhea   This is a new problem. Episode onset: 2.5 weeks ago on the evening of 06/23. The problem occurs more than 10 times per day. The problem has been unchanged. The stool  "consistency is described as watery (no blood or mucous). The patient states that diarrhea awakens her from sleep. Pertinent negatives include no chills, fever or vomiting. Risk factors: began while she was traveling. She has tried anti-motility drug for the symptoms. The treatment provided no relief.     She also so my partner, Dr. Barboza on 07/02/2021:  Pt has been having diarrhea since the end of her vacation.  She has now been having sx about 8 days.  Eating or drinking makes sx worse.  No fever.  No abd pain.  No hx diverticulitis.  She has not had a c-scope.  No past abd imaging.  No new meds.  She has bene on metformin for over a year.  No recent antibiotics.  She has had some nausea but no emesis.  She took some imodium yesterday.  bm's are loose/watery.  No blood in stool.  She is trying to stay well hydrated.    Neg stool culture, c.diff. No improvement w/ Cipro or Flagyl.      The following portions of the patient's history were reviewed and updated as appropriate: allergies, current medications, past family history, past medical history, past social history, past surgical history and problem list.    Review of Systems   Gastrointestinal: Positive for abdominal pain and diarrhea.       Objective   Vitals:    07/27/21 1058   BP: 118/67   Pulse: 75   Resp: 18   Temp: 98.6 °F (37 °C)   TempSrc: Infrared   SpO2: 99%   Weight: 97.9 kg (215 lb 12.8 oz)   Height: 172.7 cm (67.99\")       BP Readings from Last 3 Encounters:   07/27/21 118/67   07/12/21 120/70   07/02/21 120/76        Wt Readings from Last 3 Encounters:   07/27/21 97.9 kg (215 lb 12.8 oz)   07/12/21 96.3 kg (212 lb 3.2 oz)   07/02/21 97.8 kg (215 lb 9.6 oz)        Body mass index is 32.82 kg/m².  Nursing notes and vitals reviewed.    Physical Exam  Constitutional:       General: She is not in acute distress.     Appearance: She is well-developed.   Cardiovascular:      Rate and Rhythm: Regular rhythm.      Heart sounds: S1 normal and S2 normal. "   Pulmonary:      Effort: Pulmonary effort is normal.      Breath sounds: Normal breath sounds.   Abdominal:      General: Bowel sounds are increased.      Palpations: Abdomen is soft.   Neurological:      Mental Status: She is alert and oriented to person, place, and time.   Psychiatric:         Attention and Perception: She is attentive.         Behavior: Behavior normal.         Thought Content: Thought content normal.         Recent Results (from the past 672 hour(s))   Stool Culture (Reference Lab) - ,    Collection Time: 07/02/21 12:31 PM    ST     CD- [id number]   Result Value Ref Range    Salmonella/Shigella Screen Final report     Result 1 Comment     Campylobacter Culture Final report     Result 1 Comment     E coli, Shiga toxin Assay Negative Negative   Giardia / Cryptosporidium Screen - ,    Collection Time: 07/02/21 12:31 PM    ST     CD- [id number]   Result Value Ref Range    Giardia Ag, Stl Negative Negative    Cryptosporidium Antigen Negative Negative   Clostridium Difficile Toxin, PCR - ,    Collection Time: 07/02/21 12:31 PM    ST     CD- [id number]   Result Value Ref Range    C difficile Toxin Gene NAAT Negative Negative         Assessment/Plan   Diagnoses and all orders for this visit:    1. Diarrhea, unspecified type (Primary)  -     Comprehensive Metabolic Panel  -     Inflammatory Bowel Disease Panel  -     Celiac Disease Panel  -     Helicobacter Pylori, IgA IgG IgM  -     TSH  -     predniSONE (DELTASONE) 10 MG tablet; 6 tabs PO QD x 2days, then 5 tabs QD x2days, then 4 tabs QD x2days, then 3 tabs QD x2days, then 2 tabs QD x2 days, then 1 tab QD x2days  Dispense: 42 tablet; Refill: 0  -     diphenoxylate-atropine (Lomotil) 2.5-0.025 MG per tablet; Take 1 tablet by mouth 4 (Four) Times a Day As Needed for Diarrhea.  Dispense: 60 tablet; Refill: 0  -     Ambulatory Referral to Gastroenterology    2. Type 2 diabetes mellitus without complication, without long-term current use of insulin  (CMS/[facility])  -     Comprehensive Metabolic Panel  -     Hemoglobin A1c    3. Left thyroid nodule  -     TSH        Push fluids.  Keep diet light and bland. No small dense particles.   Try Protein MR shake.      Medications, including side effects, were discussed with the patient. Patient verbalized understanding.  The plan of care was discussed. All questions were answered. Patient verbalized understanding.

## 2021-07-27 NOTE — TELEPHONE ENCOUNTER
FAST TRACK - REFERRAL - LAST COLONOSCOPY,, UNABLE TO LOCATE REPORT - SCREENING & FAMILY HISTORY POLYPS, FATHER - SCHEDULE AT [place].

## 2021-07-28 ENCOUNTER — OFFICE VISIT (OUTPATIENT)
Dept: GASTROENTEROLOGY | Facility: CLINIC | Age: 61
End: 2021-07-28

## 2021-07-28 VITALS
HEIGHT: 68 IN | DIASTOLIC BLOOD PRESSURE: 88 MMHG | SYSTOLIC BLOOD PRESSURE: 138 MMHG | WEIGHT: 217.2 LBS | BODY MASS INDEX: 32.92 KG/M2

## 2021-07-28 DIAGNOSIS — R19.7 DIARRHEA OF PRESUMED INFECTIOUS ORIGIN: ICD-10-CM

## 2021-07-28 DIAGNOSIS — Z12.11 ENCOUNTER FOR SCREENING FOR MALIGNANT NEOPLASM OF COLON: Primary | ICD-10-CM

## 2021-07-28 PROCEDURE — 99212 OFFICE O/P EST SF 10 MIN: CPT | Performed by: NURSE PRACTITIONER

## 2021-07-28 NOTE — PATIENT INSTRUCTIONS
"Colonoscopy in the next 2-3 months with bx. ,  Took a lomotil last night and was able to sleep and only 2 soft bms since then.      Take a daily probiotic twice a day (something with a billion cultures and more different strains is better, examples like \"Probiotic 10\" or probiotic gummies) for your gut as well.  AVOID taking NSAIDS (like ibuprofen, Aleve, Motrin, naproxen, meloxicam, etc) as much as possible and use acetaminophen (Tylenol) instead.    "

## 2021-07-28 NOTE — PROGRESS NOTES
PATIENT INFORMATION  Claudia Gage     - 1960    CHIEF COMPLAINT  Chief Complaint   Patient presents with   • Diarrhea       HISTORY OF PRESENT ILLNESS  started  in the middle of the night severe diarrhea 15-20 times a day for 3 weeks. SS and cdiff checked. All tests were negative and put her on cipro and flagyl 7/20/21 x 10 days, and otc probiotic.  For travelers diarrhea w bms 15-20 times a day.  Still 6-8 times a day until 2 days ago then had 2 soft formed stools for the first time since .  While taking the antibiotic the poop looked coffee grounds.      Her normal bowel pattern is 1-2bms a day soft and formed.  With no prior ibs type symptoms.   Saw Darlin Kirkland QUIN yesterday and was started on zpack.  Just last night felt like she turned the corner.        REVIEWED PERTINENT RESULTS/ LABS  No results found for: CASEREPORT, FINALDX  Lab Results   Component Value Date    HGB 13.9 2020    MCV 85.2 2020     2020    ALT 17 2021    AST 21 2021    HGBA1C 6.10 (H) 2021    TRIG 196 (H) 2021      No results found.    CURRENT MEDICATIONS    Current Outpatient Medications:   •  atorvastatin (LIPITOR) 20 MG tablet, TAKE 1 TABLET BY MOUTH EVERY DAY, Disp: 14 tablet, Rfl: 0  •  clobetasol (TEMOVATE) 0.05 % cream, Apply  topically to the appropriate area as directed 2 (Two) Times a Day., Disp: 60 g, Rfl: 1  •  diphenoxylate-atropine (Lomotil) 2.5-0.025 MG per tablet, Take 1 tablet by mouth 4 (Four) Times a Day As Needed for Diarrhea., Disp: 60 tablet, Rfl: 0  •  fluticasone (FLONASE) 50 MCG/ACT nasal spray, 2 sprays into each nostril Daily., Disp: 1 each, Rfl: 11  •  Loratadine (CLARITIN PO), Take 1 tablet by mouth Daily., Disp: , Rfl:   •  metFORMIN (GLUCOPHAGE) 500 MG tablet, TAKE 2 TABLETS BY MOUTH TWICE A DAY, Disp: 120 tablet, Rfl: 2  •  metoprolol succinate XL (TOPROL-XL) 200 MG 24 hr tablet, Take 1 tablet by mouth Every Night., Disp: 90 tablet, Rfl:  3  •  ondansetron ODT (ZOFRAN-ODT) 8 MG disintegrating tablet, Place 1 tablet on the tongue Every 8 (Eight) Hours As Needed for Nausea or Vomiting., Disp: 20 tablet, Rfl: 0  •  predniSONE (DELTASONE) 10 MG tablet, 6 tabs PO QD x 2days, then 5 tabs QD x2days, then 4 tabs QD x2days, then 3 tabs QD x2days, then 2 tabs QD x2 days, then 1 tab QD x2days, Disp: 42 tablet, Rfl: 0  •  valsartan-hydrochlorothiazide (DIOVAN-HCT) 160-12.5 MG per tablet, TAKE 1 TABLET BY MOUTH EVERY DAY, Disp: 90 tablet, Rfl: 3  •  vitamin D (ERGOCALCIFEROL) 1.25 MG (49912 UT) capsule capsule, TAKE 1 CAPSULE BY MOUTH ONE TIME PER WEEK, Disp: 12 capsule, Rfl: 4  •  Xarelto 20 MG tablet, TAKE 1 TABLET BY MOUTH EVERY DAY, Disp: 90 tablet, Rfl: 3    ALLERGIES  Latex, Lisinopril, and Sulfa antibiotics    REVIEW OF SYSTEMS  ROS: 14 point review of systems was performed and all other systems were reviewed and are negative except for documented findings in HPI and today's encounter.   Review of Systems   Constitutional: Positive for activity change, appetite change and fatigue.   HENT: Negative.    Eyes: Negative.    Respiratory: Negative.    Cardiovascular: Negative.    Gastrointestinal: Positive for abdominal distention, diarrhea and nausea.   Endocrine: Negative.    Genitourinary: Negative.    Musculoskeletal: Negative.    Skin: Negative.    Allergic/Immunologic: Negative.    Neurological: Negative.  Negative for dizziness.   Hematological: Bruises/bleeds easily.   Psychiatric/Behavioral: Negative.          History     Last Reviewed by Cristina Portillo APRN on 7/28/2021 at  3:59 PM    Sections Reviewed    Tobacco, Family, Medical, Surgical      Problem list reviewed by Cristina Portillo APRN on 7/28/2021 at  3:59 PM  Medicines reviewed by Cristina Portillo APRN on 7/28/2021 at  3:59 PM  Allergies reviewed by Cristina Portillo APRN on 7/28/2021 at  3:59 PM      ACTIVE PROBLEMS  Patient Active Problem List    Diagnosis    • Diarrhea of presumed infectious  origin [R19.7]    • Type 2 diabetes mellitus without complication, without long-term current use of insulin (CMS/HCC) [E11.9]    • Sleep apnea [G47.30]    • Chronic rhinitis [J31.0]    • Paroxysmal atrial fibrillation (CMS/HCC) [I48.0]    • Left thyroid nodule [E04.1]    • HTN (hypertension) [I10]    • HLD (hyperlipidemia) [E78.5]    • Vitamin D deficiency [E55.9]    • Goiter [E04.9]          PAST MEDICAL HISTORY  Past Medical History:   Diagnosis Date   • Chronic rhinitis 10/15/2019   • Hyperlipidemia    • Hypertension    • Left thyroid nodule 2016   • Sleep apnea    • Type 2 diabetes mellitus without complication, without long-term current use of insulin (CMS/HCC) 2019         SURGICAL HISTORY  Past Surgical History:   Procedure Laterality Date   • APPENDECTOMY     •  SECTION  1998   • COLONOSCOPY  2004    [facility]          FAMILY HISTORY  Family History   Problem Relation Age of Onset   • Heart disease Mother    • Hypertension Mother    • Diabetes Mother    • Hypertension Father    • Melanoma Father    • Colon polyps Father    • Colon cancer Father    • Breast cancer Paternal Aunt          SOCIAL HISTORY  Social History     Occupational History   • Not on file   Tobacco Use   • Smoking status: Never Smoker   • Smokeless tobacco: Never Used   Substance and Sexual Activity   • Alcohol use: Not Currently     Comment: Caffeine use: None   • Drug use: No   • Sexual activity: Never         LAST RESULTS  See also labs reviewed above.   Orders Only on 2021   Component Date Value Ref Range Status   • Salmonella/Shigella Screen 2021 Final report   Final   • Result 1 2021 Comment   Final    No Salmonella or Shigella recovered.   • Campylobacter Culture 2021 Final report   Final   • Result 1 2021 Comment   Final    No Campylobacter species isolated.   • E coli, Shiga toxin Assay 2021 Negative  Negative Final   • Giardia Ag, Stl 2021  "Negative  Negative Final   • Cryptosporidium Antigen 07/02/2021 Negative  Negative Final   • C difficile Toxin Gene NAAT 07/02/2021 Negative  Negative Final     No results found.    PHYSICAL EXAM  Debilities/Disabilities Identified: None  Emotional Behavior: Appropriate  60 y.o. female  Wt Readings from Last 3 Encounters:   07/28/21 98.5 kg (217 lb 3.2 oz)   07/27/21 97.9 kg (215 lb 12.8 oz)   07/12/21 96.3 kg (212 lb 3.2 oz)     Ht Readings from Last 1 Encounters:   07/28/21 172.7 cm (67.99\")     Body mass index is 33.03 kg/m².  Vitals:    07/28/21 1422   BP: 138/88   BP Location: Left arm   Patient Position: Sitting   Cuff Size: Adult   Weight: 98.5 kg (217 lb 3.2 oz)   Height: 172.7 cm (67.99\")     Vital signs reviewed.          Physical Exam  Vitals and nursing note reviewed.   Constitutional:       Appearance: She is well-developed.   HENT:      Head: Normocephalic and atraumatic.   Eyes:      Conjunctiva/sclera: Conjunctivae normal.      Pupils: Pupils are equal, round, and reactive to light.   Cardiovascular:      Rate and Rhythm: Normal rate and regular rhythm.   Pulmonary:      Effort: Pulmonary effort is normal.      Breath sounds: Normal breath sounds.   Abdominal:      General: Bowel sounds are normal. There is no distension.      Palpations: Abdomen is soft.      Tenderness: There is abdominal tenderness in the left lower quadrant.   Musculoskeletal:         General: Normal range of motion.      Cervical back: Normal range of motion and neck supple.   Lymphadenopathy:      Cervical: No cervical adenopathy.   Skin:     General: Skin is warm and dry.   Neurological:      Mental Status: She is alert and oriented to person, place, and time.   Psychiatric:         Behavior: Behavior normal.           CLINICAL DATA REVIEWED   reviewed previous lab results and integrated with today's visit, reviewed notes from other physicians and/or last GI encounter, reviewed previous endoscopy results and available photos, " "reviewed surgical pathology results from previous biopsies      ASSESSMENT  Diagnoses and all orders for this visit:    Encounter for screening for malignant neoplasm of colon  -     Case Request; Standing  -     Follow Anesthesia Guidelines / Protocol; Future  -     Obtain informed consent; Standing  -     Verify bowel prep was successful; Standing  -     Give tap water enema if bowel prep was insufficient; Standing  -     Case Request    Diarrhea of presumed infectious origin          PLAN  Return if symptoms worsen or fail to improve.     Colonoscopy in the next 2-3 months with bx. ,  Took a lomotil last night and was able to sleep and only 2 soft bms since then.      Take a daily probiotic twice a day (something with a billion cultures and more different strains is better, examples like \"Probiotic 10\" or probiotic gummies) for your gut as well.  AVOID taking NSAIDS (like ibuprofen, Aleve, Motrin, naproxen, meloxicam, etc) as much as possible and use acetaminophen (Tylenol) instead.    I have discussed the above plan with the patient.  They verbalize understanding and are in agreement with the plan.  They have been advised to contact the office for any questions, concerns, or changes related to their health.    30 min spent with patient today >50% spent counseling about natural history and expected course of assessed complaint and reviewed treatment options that have been tried and not tried and those currently available. Questions answered.    "

## 2021-07-29 PROBLEM — Z12.11 ENCOUNTER FOR SCREENING FOR MALIGNANT NEOPLASM OF COLON: Status: ACTIVE | Noted: 2021-07-29

## 2021-07-29 LAB
ALBUMIN SERPL-MCNC: 4.2 G/DL (ref 3.5–5.2)
ALBUMIN/GLOB SERPL: 1.4 G/DL
ALP SERPL-CCNC: 70 U/L (ref 39–117)
ALT SERPL-CCNC: 30 U/L (ref 1–33)
AST SERPL-CCNC: 23 U/L (ref 1–32)
BAKER'S YEAST IGA QN: 20.5 UNITS (ref 0–24.9)
BAKER'S YEAST IGG QN: 48.1 UNITS (ref 0–24.9)
BILIRUB SERPL-MCNC: 0.6 MG/DL (ref 0–1.2)
BUN SERPL-MCNC: 10 MG/DL (ref 8–23)
BUN/CREAT SERPL: 10.9 (ref 7–25)
CALCIUM SERPL-MCNC: 9.5 MG/DL (ref 8.6–10.5)
CHLORIDE SERPL-SCNC: 102 MMOL/L (ref 98–107)
CO2 SERPL-SCNC: 25.6 MMOL/L (ref 22–29)
CREAT SERPL-MCNC: 0.92 MG/DL (ref 0.57–1)
ENDOMYSIUM IGA SER QL: NEGATIVE
GLOBULIN SER CALC-MCNC: 2.9 GM/DL
GLUCOSE SERPL-MCNC: 109 MG/DL (ref 65–99)
H PYLORI IGA SER-ACNC: 12.9 UNITS (ref 0–8.9)
H PYLORI IGG SER IA-ACNC: 5.1 INDEX VALUE (ref 0–0.79)
H PYLORI IGM SER-ACNC: <9 UNITS (ref 0–8.9)
HBA1C MFR BLD: 5.9 % (ref 4.8–5.6)
IGA SERPL-MCNC: 195 MG/DL (ref 87–352)
P-ANCA ATYPICAL TITR SER IF: ABNORMAL TITER
POTASSIUM SERPL-SCNC: 4.9 MMOL/L (ref 3.5–5.2)
PROT SERPL-MCNC: 7.1 G/DL (ref 6–8.5)
SODIUM SERPL-SCNC: 140 MMOL/L (ref 136–145)
TSH SERPL DL<=0.005 MIU/L-ACNC: 0.96 UIU/ML (ref 0.27–4.2)
TTG IGA SER-ACNC: <2 U/ML (ref 0–3)

## 2021-07-30 ENCOUNTER — TELEPHONE (OUTPATIENT)
Dept: INTERNAL MEDICINE | Facility: CLINIC | Age: 61
End: 2021-07-30

## 2021-07-30 RX ORDER — CLARITHROMYCIN 500 MG/1
500 TABLET, COATED ORAL 2 TIMES DAILY
Qty: 28 TABLET | Refills: 0 | Status: SHIPPED | OUTPATIENT
Start: 2021-07-30 | End: 2021-08-13

## 2021-07-30 RX ORDER — AMOXICILLIN 500 MG/1
1000 CAPSULE ORAL 2 TIMES DAILY
Qty: 56 CAPSULE | Refills: 0 | Status: ON HOLD | OUTPATIENT
Start: 2021-07-30 | End: 2021-11-13

## 2021-07-30 NOTE — TELEPHONE ENCOUNTER
----- Message from QUIN Tang sent at 7/29/2021  1:20 PM EDT -----  Hub please inform patients, the results of PIP - some of her labs are abnl. I have sent these on to GI.

## 2021-08-02 ENCOUNTER — TELEPHONE (OUTPATIENT)
Dept: GASTROENTEROLOGY | Facility: CLINIC | Age: 61
End: 2021-08-02

## 2021-08-02 DIAGNOSIS — K21.00 GASTROESOPHAGEAL REFLUX DISEASE WITH ESOPHAGITIS WITHOUT HEMORRHAGE: ICD-10-CM

## 2021-08-02 DIAGNOSIS — A04.8 H. PYLORI INFECTION: Primary | ICD-10-CM

## 2021-08-02 RX ORDER — OMEPRAZOLE 40 MG/1
40 CAPSULE, DELAYED RELEASE ORAL DAILY
Qty: 90 CAPSULE | Refills: 3 | Status: SHIPPED | OUTPATIENT
Start: 2021-08-02 | End: 2022-01-26

## 2021-08-02 NOTE — TELEPHONE ENCOUNTER
PT CALLED AND WANTED TO KNOW THE RESULTS FROM HER LABS, AND THE REASON WHY SHE IS TAKING THE TWO ANTIBIOTICS. HER PHARMACIST ALSO RECOMMENDED THAT SHE BE ON A PPI WHILE TAKING THE TWO ANTIBIOTICS.

## 2021-08-02 NOTE — TELEPHONE ENCOUNTER
----- Message from Lawrence Toussaint MD sent at 7/30/2021  4:07 PM EDT -----  Regarding: RE: Positive Result  OK took me abit so Petrona ordered these, and I dont use that IBD panel and of course im about to look in her colon so it doesn't matter if its positive or not. WRT HP abs yes I would treat these as long as she hasnt been treated for this before(??) Presuming NOT then I sent in two Abx for 14 days to take at the same time and make sure she is on or picks up a PROBIOTIC to make it go abit easier.   ----- Message -----  From: Kell Miller RN  Sent: 7/30/2021  11:57 AM EDT  To: Lawrence Toussaint MD  Subject: Positive Result                                  Please see H Pylori and labs for Claudia Gage.   Please advise I will call pt back .    Thanks  ----- Message -----  From: Madalyn Brown  Sent: 7/30/2021  11:34 AM EDT  To: Kell Miller RN    Do you think Breana will want to review this since Cristina is off?

## 2021-08-04 ENCOUNTER — TELEPHONE (OUTPATIENT)
Dept: GASTROENTEROLOGY | Facility: CLINIC | Age: 61
End: 2021-08-04

## 2021-08-10 ENCOUNTER — PREP FOR SURGERY (OUTPATIENT)
Dept: OTHER | Facility: HOSPITAL | Age: 61
End: 2021-08-10

## 2021-08-20 ENCOUNTER — OFFICE VISIT (OUTPATIENT)
Dept: INTERNAL MEDICINE | Facility: CLINIC | Age: 61
End: 2021-08-20

## 2021-08-20 ENCOUNTER — TELEPHONE (OUTPATIENT)
Dept: GASTROENTEROLOGY | Facility: CLINIC | Age: 61
End: 2021-08-20

## 2021-08-20 VITALS
BODY MASS INDEX: 33.19 KG/M2 | OXYGEN SATURATION: 99 % | SYSTOLIC BLOOD PRESSURE: 126 MMHG | TEMPERATURE: 98.4 F | DIASTOLIC BLOOD PRESSURE: 84 MMHG | WEIGHT: 219 LBS | HEIGHT: 68 IN | HEART RATE: 71 BPM

## 2021-08-20 DIAGNOSIS — R19.7 DIARRHEA, UNSPECIFIED TYPE: Primary | ICD-10-CM

## 2021-08-20 PROCEDURE — 99213 OFFICE O/P EST LOW 20 MIN: CPT | Performed by: NURSE PRACTITIONER

## 2021-08-20 NOTE — TELEPHONE ENCOUNTER
PT STOPPED IN AND STATED THAT SHE FINISHED THE ABX IS STILL ON  AMOXICILLIN AND WAS DOING GOOD AND THEN TODAY STARTED HAVING DIARRHEA AGAIN.  SHE WANTS TO KNOW WHAT SHE SHOULD DO.

## 2021-08-20 NOTE — PROGRESS NOTES
Subjective   Claudia Gage is a 61 y.o. female presenting today for follow up of   Chief Complaint   Patient presents with   • Diarrhea     f/u       History of Present Illness     Patient Active Problem List   Diagnosis   • HTN (hypertension)   • HLD (hyperlipidemia)   • Left thyroid nodule   • Paroxysmal atrial fibrillation (CMS/HCC)   • Goiter   • Vitamin D deficiency   • Sleep apnea   • Chronic rhinitis   • Type 2 diabetes mellitus without complication, without long-term current use of insulin (CMS/HCC)   • Diarrhea of presumed infectious origin   • Encounter for screening for malignant neoplasm of colon       Current Outpatient Medications on File Prior to Visit   Medication Sig   • amoxicillin (AMOXIL) 500 MG capsule Take 2 capsules by mouth 2 (Two) Times a Day.   • atorvastatin (LIPITOR) 20 MG tablet TAKE 1 TABLET BY MOUTH EVERY DAY   • clobetasol (TEMOVATE) 0.05 % cream Apply  topically to the appropriate area as directed 2 (Two) Times a Day.   • fluticasone (FLONASE) 50 MCG/ACT nasal spray 2 sprays into each nostril Daily.   • Loratadine (CLARITIN PO) Take 1 tablet by mouth Daily.   • metFORMIN (GLUCOPHAGE) 500 MG tablet TAKE 2 TABLETS BY MOUTH TWICE A DAY   • metoprolol succinate XL (TOPROL-XL) 200 MG 24 hr tablet Take 1 tablet by mouth Every Night.   • omeprazole (priLOSEC) 40 MG capsule Take 1 capsule by mouth Daily.   • ondansetron ODT (ZOFRAN-ODT) 8 MG disintegrating tablet Place 1 tablet on the tongue Every 8 (Eight) Hours As Needed for Nausea or Vomiting.   • valsartan-hydrochlorothiazide (DIOVAN-HCT) 160-12.5 MG per tablet TAKE 1 TABLET BY MOUTH EVERY DAY   • vitamin D (ERGOCALCIFEROL) 1.25 MG (59110 UT) capsule capsule TAKE 1 CAPSULE BY MOUTH ONE TIME PER WEEK   • Xarelto 20 MG tablet TAKE 1 TABLET BY MOUTH EVERY DAY   • diphenoxylate-atropine (Lomotil) 2.5-0.025 MG per tablet Take 1 tablet by mouth 4 (Four) Times a Day As Needed for Diarrhea.   • predniSONE (DELTASONE) 10 MG tablet 6 tabs PO QD x  "2days, then 5 tabs QD x2days, then 4 tabs QD x2days, then 3 tabs QD x2days, then 2 tabs QD x2 days, then 1 tab QD x2days     No current facility-administered medications on file prior to visit.      Positive IBD panel. Positive H. Pylori. Diarrhea improved while she was taking steroids. She was prescribed dual abx and PPI by GI. Diarrhea resumed right after she completed steroids. She is still in the process of completing abx. She is scheduled for CLS.    The following portions of the patient's history were reviewed and updated as appropriate: allergies, current medications, past family history, past medical history, past social history, past surgical history and problem list.        Objective   Vitals:    08/20/21 1531   BP: 126/84   Pulse: 71   Temp: 98.4 °F (36.9 °C)   TempSrc: Temporal   SpO2: 99%   Weight: 99.3 kg (219 lb)   Height: 172.7 cm (67.99\")       BP Readings from Last 3 Encounters:   08/20/21 126/84   07/28/21 138/88   07/27/21 118/67        Wt Readings from Last 3 Encounters:   08/20/21 99.3 kg (219 lb)   07/28/21 98.5 kg (217 lb 3.2 oz)   07/27/21 97.9 kg (215 lb 12.8 oz)        Body mass index is 33.31 kg/m².  Nursing notes and vitals reviewed.    Physical Exam  Constitutional:       General: She is not in acute distress.     Appearance: She is well-developed.   Pulmonary:      Effort: Pulmonary effort is normal.   Neurological:      Mental Status: She is alert.   Psychiatric:         Attention and Perception: She is attentive.         Speech: Speech normal.         Recent Results (from the past 672 hour(s))   Comprehensive Metabolic Panel    Collection Time: 07/27/21 11:27 AM    Specimen: Blood   Result Value Ref Range    Glucose 109 (H) 65 - 99 mg/dL    BUN 10 8 - 23 mg/dL    Creatinine 0.92 0.57 - 1.00 mg/dL    eGFR Non African Am 62 >60 mL/min/1.73    eGFR African Am 75 >60 mL/min/1.73    BUN/Creatinine Ratio 10.9 7.0 - 25.0    Sodium 140 136 - 145 mmol/L    Potassium 4.9 3.5 - 5.2 mmol/L    " Chloride 102 98 - 107 mmol/L    Total CO2 25.6 22.0 - 29.0 mmol/L    Calcium 9.5 8.6 - 10.5 mg/dL    Total Protein 7.1 6.0 - 8.5 g/dL    Albumin 4.20 3.50 - 5.20 g/dL    Globulin 2.9 gm/dL    A/G Ratio 1.4 g/dL    Total Bilirubin 0.6 0.0 - 1.2 mg/dL    Alkaline Phosphatase 70 39 - 117 U/L    AST (SGOT) 23 1 - 32 U/L    ALT (SGPT) 30 1 - 33 U/L   Hemoglobin A1c    Collection Time: 07/27/21 11:27 AM    Specimen: Blood   Result Value Ref Range    Hemoglobin A1C 5.90 (H) 4.80 - 5.60 %   Inflammatory Bowel Disease Panel    Collection Time: 07/27/21 11:27 AM    Specimen: Blood   Result Value Ref Range    Saccharomyces cerevisiae Ab IgG 48.1 (H) 0.0 - 24.9 Units    Saccharomyces cerevisiae Ab IgA 20.5 0.0 - 24.9 Units    Atypical pANCA <1:20 Neg:<1:20 titer   Celiac Disease Panel    Collection Time: 07/27/21 11:27 AM    Specimen: Blood   Result Value Ref Range    Endomysial IgA Negative Negative    Tissue Transglutaminase IgA <2 0 - 3 U/mL    IgA 195 87 - 352 mg/dL   Helicobacter Pylori, IgA IgG IgM    Collection Time: 07/27/21 11:27 AM    Specimen: Blood   Result Value Ref Range    H. pylori IgG 5.10 (H) 0.00 - 0.79 Index Value    H. pylori, IgA ABS 12.9 (H) 0.0 - 8.9 units    H. Pylori, IgM <9.0 0.0 - 8.9 units   TSH    Collection Time: 07/27/21 11:27 AM    Specimen: Blood   Result Value Ref Range    TSH 0.958 0.270 - 4.200 uIU/mL         Assessment/Plan   Diagnoses and all orders for this visit:    1. Diarrhea, unspecified type (Primary)  Comments:  - pos IBD panel, subsided w/ steroid, but resumed immediately after completion  - needs f/u w/ GI ASAP  - needs scope            The plan of care was discussed. All questions were answered. Patient verbalized understanding.

## 2021-09-15 DIAGNOSIS — E11.9 TYPE 2 DIABETES MELLITUS WITHOUT COMPLICATION, WITHOUT LONG-TERM CURRENT USE OF INSULIN (HCC): ICD-10-CM

## 2021-09-27 ENCOUNTER — TELEPHONE (OUTPATIENT)
Dept: INTERNAL MEDICINE | Facility: CLINIC | Age: 61
End: 2021-09-27

## 2021-09-27 DIAGNOSIS — R20.0 NUMBNESS OF RIGHT LOWER EXTREMITY: ICD-10-CM

## 2021-09-27 DIAGNOSIS — G89.29 CHRONIC RIGHT HIP PAIN: Primary | ICD-10-CM

## 2021-09-27 DIAGNOSIS — M25.551 CHRONIC RIGHT HIP PAIN: Primary | ICD-10-CM

## 2021-09-27 NOTE — TELEPHONE ENCOUNTER
Caller: Claudia Gage    Relationship: Self    Best call back number: [phone]    What orders are you requesting (i.e. lab or imaging): PHYSICAL THERAPY    In what timeframe would the patient need to come in: N/A    Where will you receive your lab/imaging services: N/A    Additional notes: PATIENT STATED HER OLD ORDER FOR PT IS TOO OLD AND NEEDS A NEW ORDER SENT IN [facility].      PATIENT STATED SHE THOUGHT IT WAS April OR MAY WHEN THIS WAS ORIGINALLY ORDERED.

## 2021-09-30 ENCOUNTER — HOSPITAL ENCOUNTER (OUTPATIENT)
Dept: PHYSICAL THERAPY | Facility: HOSPITAL | Age: 61
Setting detail: THERAPIES SERIES
Discharge: HOME | End: 2021-09-30

## 2021-09-30 DIAGNOSIS — M25.551 CHRONIC RIGHT HIP PAIN: Primary | ICD-10-CM

## 2021-09-30 DIAGNOSIS — R20.0 NUMBNESS OF RIGHT LOWER EXTREMITY: ICD-10-CM

## 2021-09-30 DIAGNOSIS — G89.29 CHRONIC RIGHT HIP PAIN: Primary | ICD-10-CM

## 2021-09-30 PROCEDURE — 97161 PT EVAL LOW COMPLEX 20 MIN: CPT

## 2021-10-04 ENCOUNTER — HOSPITAL ENCOUNTER (OUTPATIENT)
Dept: PHYSICAL THERAPY | Facility: HOSPITAL | Age: 61
Setting detail: THERAPIES SERIES
Discharge: HOME | End: 2021-10-04

## 2021-10-04 DIAGNOSIS — M25.551 CHRONIC RIGHT HIP PAIN: Primary | ICD-10-CM

## 2021-10-04 DIAGNOSIS — R20.0 NUMBNESS OF RIGHT LOWER EXTREMITY: ICD-10-CM

## 2021-10-04 DIAGNOSIS — G89.29 CHRONIC RIGHT HIP PAIN: Primary | ICD-10-CM

## 2021-10-04 PROCEDURE — 97110 THERAPEUTIC EXERCISES: CPT

## 2021-10-04 PROCEDURE — 97012 MECHANICAL TRACTION THERAPY: CPT

## 2021-10-04 NOTE — THERAPY TREATMENT NOTE
Outpatient Physical Therapy Ortho Treatment Note  [facility]     Patient Name: Claudia Gage  : 1960  MRN: [id number]  Today's Date: 10/4/2021      Visit Date: 10/04/2021    Visit Dx:    ICD-10-CM ICD-9-CM   1. Chronic right hip pain  M25.551 719.45    G89.29 338.29   2. Numbness of right lower extremity  R20.0 782.0       Patient Active Problem List   Diagnosis   • HTN (hypertension)   • HLD (hyperlipidemia)   • Left thyroid nodule   • Paroxysmal atrial fibrillation (HCC)   • Goiter   • Vitamin D deficiency   • Sleep apnea   • Chronic rhinitis   • Type 2 diabetes mellitus without complication, without long-term current use of insulin ([facility])   • Diarrhea of presumed infectious origin   • Encounter for screening for malignant neoplasm of colon        Past Medical History:   Diagnosis Date   • Chronic rhinitis 10/15/2019   • Hyperlipidemia    • Hypertension    • Left thyroid nodule 2016   • Sleep apnea    • Type 2 diabetes mellitus without complication, without long-term current use of insulin (CMS/HCC) 2019        Past Surgical History:   Procedure Laterality Date   • APPENDECTOMY     •  SECTION  1998   • COLONOSCOPY  2004    [facility]                        PT Assessment/Plan     Row Name 10/04/21 1100          PT Assessment    Assessment Comments  Continued with flexibility exercises today as well as lumbar traction as patient reports feeling better overall.  -AS        PT Plan    PT Plan Comments  Continue with current treatment plan.  -AS       User Key  (r) = Recorded By, (t) = Taken By, (c) = Cosigned By    Initials Name Provider Type    AS Aldair Perdue, PT Physical Therapist          Modalities     Row Name 10/04/21 1100             Traction 58018    Traction Type  Lumbar  -AS      PT Traction Rx Minutes  15  -AS      Duration  Intermittent  -AS      Position  Supine  -AS      Weight  75  -AS      Hold  60  -AS      Relax  20  -AS          "Functional Testing    Outcome Measure Options  Lower Extremity Functional Scale (LEFS)  -AS        User Key  (r) = Recorded By, (t) = Taken By, (c) = Cosigned By    Initials Name Provider Type    AS Aldair Perdue, PT Physical Therapist        OP Exercises     Row Name 10/04/21 1100             Subjective Comments    Subjective Comments  Patient states she is sore from \"cleaning a lot this weekend\". She states overall she is feeling better.  -AS         Exercise 1    Exercise Name 1  R HS Stretch with ADD  -AS      Reps 1  15  -AS      Time 1  10 sec hold each  -AS         Exercise 2    Exercise Name 2  R Piriformis Stretch  -AS      Reps 2  15  -AS      Time 2  10 sec hold each  -AS         Exercise 3    Exercise Name 3  R Sciatic Nerve Glides  -AS      Reps 3  25  -AS         Exercise 4    Exercise Name 4  Prone Press Up  -AS      Reps 4  10  -AS      Time 4  5 sec hold each  -AS        User Key  (r) = Recorded By, (t) = Taken By, (c) = Cosigned By    Initials Name Provider Type    AS Aldair Perdue, PT Physical Therapist                                Outcome Measure Options: Lower Extremity Functional Scale (LEFS)         Time Calculation:   Start Time: 1059  Stop Time: 1130  Time Calculation (min): 31 min  Untimed Charges  PT Traction Rx Minutes: 15  Total Minutes  Untimed Charges Total Minutes: 15   Total Minutes: 15  Therapy Charges for Today     Code Description Service Date Service Provider Modifiers Qty    [id number]  PT THER PROC EA 15 MIN 10/4/2021 Aldair Perdue, PT GP 1    [id number] HC PT TRACTION LUMBAR 10/4/2021 Aldair Perdue, PT GP 1          PT G-Codes  Outcome Measure Options: Lower Extremity Functional Scale (LEFS)         Aldair Perdue, PT  10/4/2021     "

## 2021-10-06 ENCOUNTER — HOSPITAL ENCOUNTER (OUTPATIENT)
Dept: PHYSICAL THERAPY | Facility: HOSPITAL | Age: 61
Setting detail: THERAPIES SERIES
Discharge: HOME | End: 2021-10-06

## 2021-10-06 DIAGNOSIS — M25.551 CHRONIC RIGHT HIP PAIN: Primary | ICD-10-CM

## 2021-10-06 DIAGNOSIS — R20.0 NUMBNESS OF RIGHT LOWER EXTREMITY: ICD-10-CM

## 2021-10-06 DIAGNOSIS — G89.29 CHRONIC RIGHT HIP PAIN: Primary | ICD-10-CM

## 2021-10-06 PROCEDURE — 97110 THERAPEUTIC EXERCISES: CPT

## 2021-10-06 PROCEDURE — 97012 MECHANICAL TRACTION THERAPY: CPT

## 2021-10-06 NOTE — THERAPY TREATMENT NOTE
Outpatient Physical Therapy Ortho Treatment Note  [facility]     Patient Name: Claudia Gage  : 1960  MRN: [id number]  Today's Date: 10/6/2021      Visit Date: 10/06/2021    Visit Dx:    ICD-10-CM ICD-9-CM   1. Chronic right hip pain  M25.551 719.45    G89.29 338.29   2. Numbness of right lower extremity  R20.0 782.0       Patient Active Problem List   Diagnosis   • HTN (hypertension)   • HLD (hyperlipidemia)   • Left thyroid nodule   • Paroxysmal atrial fibrillation (HCC)   • Goiter   • Vitamin D deficiency   • Sleep apnea   • Chronic rhinitis   • Type 2 diabetes mellitus without complication, without long-term current use of insulin ([facility])   • Diarrhea of presumed infectious origin   • Encounter for screening for malignant neoplasm of colon        Past Medical History:   Diagnosis Date   • Chronic rhinitis 10/15/2019   • Hyperlipidemia    • Hypertension    • Left thyroid nodule 2016   • Sleep apnea    • Type 2 diabetes mellitus without complication, without long-term current use of insulin (CMS/HCC) 2019        Past Surgical History:   Procedure Laterality Date   • APPENDECTOMY     •  SECTION  1998   • COLONOSCOPY  2004    [facility]                        PT Assessment/Plan     Row Name 10/06/21 1100          PT Assessment    Assessment Comments  Continued with flexibility exercises today as well as lumbar traction as patient reports feeling better overall and continues to improve at this time.  -AS        PT Plan    PT Plan Comments  Continue with current treatment plan.  -AS       User Key  (r) = Recorded By, (t) = Taken By, (c) = Cosigned By    Initials Name Provider Type    AS Aldair Perdue, PT Physical Therapist          Modalities     Row Name 10/06/21 1100             Traction 05389    Traction Type  Lumbar  -AS      PT Traction Rx Minutes  20  -AS      Duration  Intermittent  -AS      Position  Supine  -AS      Weight  80  -AS      Hold   60  -AS      Relax  10  -AS         Functional Testing    Outcome Measure Options  Lower Extremity Functional Scale (LEFS)  -AS        User Key  (r) = Recorded By, (t) = Taken By, (c) = Cosigned By    Initials Name Provider Type    AS Aldair Perdue, PT Physical Therapist        OP Exercises     Row Name 10/06/21 1100             Subjective Comments    Subjective Comments  Patient states she continues to do well and continues to improve with PT.  -AS         Exercise 1    Exercise Name 1  R HS Stretch with ADD  -AS      Reps 1  15  -AS      Time 1  10 sec hold each  -AS         Exercise 2    Exercise Name 2  R Piriformis Stretch  -AS      Reps 2  15  -AS      Time 2  10 sec hold each  -AS         Exercise 3    Exercise Name 3  R Sciatic Nerve Glides  -AS      Reps 3  25  -AS         Exercise 4    Exercise Name 4  Prone Press Up  -AS      Reps 4  10  -AS      Time 4  5 sec hold each  -AS        User Key  (r) = Recorded By, (t) = Taken By, (c) = Cosigned By    Initials Name Provider Type    AS Aldair Perdue, PT Physical Therapist                                Outcome Measure Options: Lower Extremity Functional Scale (LEFS)         Time Calculation:   Start Time: 1059  Stop Time: 1141  Time Calculation (min): 42 min  Untimed Charges  PT Traction Rx Minutes: 20  Total Minutes  Untimed Charges Total Minutes: 20   Total Minutes: 20  Therapy Charges for Today     Code Description Service Date Service Provider Modifiers Qty    54423530459 HC PT THER PROC EA 15 MIN 10/6/2021 Aldari Perdue, PT GP 1    18020036605 HC PT TRACTION LUMBAR 10/6/2021 Aldair Perdue, PT GP 1          PT G-Codes  Outcome Measure Options: Lower Extremity Functional Scale (LEFS)         Aldair Perdue, PT  10/6/2021

## 2021-10-11 ENCOUNTER — APPOINTMENT (OUTPATIENT)
Dept: PHYSICAL THERAPY | Facility: HOSPITAL | Age: 61
End: 2021-10-11

## 2021-10-13 ENCOUNTER — HOSPITAL ENCOUNTER (OUTPATIENT)
Dept: PHYSICAL THERAPY | Facility: HOSPITAL | Age: 61
Setting detail: THERAPIES SERIES
Discharge: HOME | End: 2021-10-13

## 2021-10-13 DIAGNOSIS — R20.0 NUMBNESS OF RIGHT LOWER EXTREMITY: ICD-10-CM

## 2021-10-13 DIAGNOSIS — M25.551 CHRONIC RIGHT HIP PAIN: Primary | ICD-10-CM

## 2021-10-13 DIAGNOSIS — G89.29 CHRONIC RIGHT HIP PAIN: Primary | ICD-10-CM

## 2021-10-13 PROCEDURE — 97012 MECHANICAL TRACTION THERAPY: CPT

## 2021-10-13 NOTE — THERAPY TREATMENT NOTE
Outpatient Physical Therapy Ortho Treatment Note  [facility]     Patient Name: Claudai Gage  : 1960  MRN: [id number]  Today's Date: 10/13/2021      Visit Date: 10/13/2021    Visit Dx:    ICD-10-CM ICD-9-CM   1. Chronic right hip pain  M25.551 719.45    G89.29 338.29   2. Numbness of right lower extremity  R20.0 782.0       Patient Active Problem List   Diagnosis   • HTN (hypertension)   • HLD (hyperlipidemia)   • Left thyroid nodule   • Paroxysmal atrial fibrillation (HCC)   • Goiter   • Vitamin D deficiency   • Sleep apnea   • Chronic rhinitis   • Type 2 diabetes mellitus without complication, without long-term current use of insulin ([facility])   • Diarrhea of presumed infectious origin   • Encounter for screening for malignant neoplasm of colon        Past Medical History:   Diagnosis Date   • Chronic rhinitis 10/15/2019   • Hyperlipidemia    • Hypertension    • Left thyroid nodule 2016   • Sleep apnea    • Type 2 diabetes mellitus without complication, without long-term current use of insulin (CMS/HCC) 2019        Past Surgical History:   Procedure Laterality Date   • APPENDECTOMY     •  SECTION  1998   • [facility]                         PT Assessment/Plan     Row Name 10/13/21 1100          PT Assessment    Assessment Comments Continued with flexibility exercises today as well as lumbar traction as patient reports feeling better overall and continues to improve at this time.  -AS            PT Plan    PT Plan Comments Continue with current treatment plan.  -AS           User Key  (r) = Recorded By, (t) = Taken By, (c) = Cosigned By    Initials Name Provider Type    AS Aldair Perdue, PT Physical Therapist                 Modalities     Row Name 10/13/21 1100             Traction 29931    Traction Type Lumbar  -AS      PT Traction Rx Minutes 20  -AS      Duration Intermittent  -AS      Position Supine  -AS      Weight 85  -AS       Hold 60  -AS      Relax 10  -AS              Functional Testing    Outcome Measure Options Lower Extremity Functional Scale (LEFS)  -AS            User Key  (r) = Recorded By, (t) = Taken By, (c) = Cosigned By    Initials Name Provider Type    AS Aldair Perdue, PT Physical Therapist               OP Exercises     Row Name 10/13/21 1100             Subjective Comments    Subjective Comments Patient states she is feeling better and the exercises and lumbar traction continue to give her symptom relief.  -AS              Exercise 1    Exercise Name 1 R HS Stretch with ADD  -AS      Reps 1 15  -AS      Time 1 10 sec hold each  -AS              Exercise 2    Exercise Name 2 R Piriformis Stretch  -AS      Reps 2 15  -AS      Time 2 10 sec hold each  -AS              Exercise 3    Exercise Name 3 R Sciatic Nerve Glides  -AS      Reps 3 25  -AS              Exercise 4    Exercise Name 4 Prone Press Up  -AS      Reps 4 10  -AS      Time 4 5 sec hold each  -AS            User Key  (r) = Recorded By, (t) = Taken By, (c) = Cosigned By    Initials Name Provider Type    AS Aldair Perdue, PT Physical Therapist                                      Outcome Measure Options: Lower Extremity Functional Scale (LEFS)         Time Calculation:   Start Time: 1100  Stop Time: 1140  Time Calculation (min): 40 min  Untimed Charges  PT Traction Rx Minutes: 20  Total Minutes  Untimed Charges Total Minutes: 20   Total Minutes: 20  Therapy Charges for Today     Code Description Service Date Service Provider Modifiers Qty    78163060378 HC PT TRACTION LUMBAR 10/13/2021 Aldair Perdue, PT GP 1          PT G-Codes  Outcome Measure Options: Lower Extremity Functional Scale (LEFS)         Aldair Perdue, WESLEY  10/13/2021

## 2021-10-15 NOTE — TELEPHONE ENCOUNTER
Good afternoon.  Patient is scheduled for colonoscopy on 11/13/2021.  She taking Xarelto.  Can she hold prior to procedure?  If so, how many days?    Thank you!  Josie

## 2021-11-03 DIAGNOSIS — Z01.818 OTHER SPECIFIED PRE-OPERATIVE EXAMINATION: Primary | ICD-10-CM

## 2021-11-10 ENCOUNTER — LAB (OUTPATIENT)
Dept: LAB | Facility: HOSPITAL | Age: 61
End: 2021-11-10

## 2021-11-10 DIAGNOSIS — Z01.818 OTHER SPECIFIED PRE-OPERATIVE EXAMINATION: ICD-10-CM

## 2021-11-10 LAB — SARS-COV-2 RNA PNL SPEC NAA+PROBE: NOT DETECTED

## 2021-11-10 PROCEDURE — C9803 HOPD COVID-19 SPEC COLLECT: HCPCS

## 2021-11-10 PROCEDURE — 87635 SARS-COV-2 COVID-19 AMP PRB: CPT | Performed by: INTERNAL MEDICINE

## 2021-11-12 ENCOUNTER — ANESTHESIA EVENT (OUTPATIENT)
Dept: PERIOP | Facility: HOSPITAL | Age: 61
End: 2021-11-12

## 2021-11-13 ENCOUNTER — HOSPITAL ENCOUNTER (OUTPATIENT)
Facility: HOSPITAL | Age: 61
Setting detail: HOSPITAL OUTPATIENT SURGERY
Discharge: HOME | End: 2021-11-13
Attending: INTERNAL MEDICINE | Admitting: INTERNAL MEDICINE

## 2021-11-13 ENCOUNTER — ANESTHESIA (OUTPATIENT)
Dept: PERIOP | Facility: HOSPITAL | Age: 61
End: 2021-11-13

## 2021-11-13 VITALS
OXYGEN SATURATION: 99 % | WEIGHT: 220 LBS | BODY MASS INDEX: 33.46 KG/M2 | DIASTOLIC BLOOD PRESSURE: 87 MMHG | HEART RATE: 64 BPM | RESPIRATION RATE: 14 BRPM | SYSTOLIC BLOOD PRESSURE: 158 MMHG | TEMPERATURE: 98.5 F

## 2021-11-13 DIAGNOSIS — Z12.11 ENCOUNTER FOR SCREENING FOR MALIGNANT NEOPLASM OF COLON: ICD-10-CM

## 2021-11-13 LAB — GLUCOSE BLDC GLUCOMTR-MCNC: 117 MG/DL (ref 70–130)

## 2021-11-13 PROCEDURE — 45380 COLONOSCOPY AND BIOPSY: CPT | Performed by: INTERNAL MEDICINE

## 2021-11-13 PROCEDURE — 82962 GLUCOSE BLOOD TEST: CPT

## 2021-11-13 PROCEDURE — 45385 COLONOSCOPY W/LESION REMOVAL: CPT | Performed by: INTERNAL MEDICINE

## 2021-11-13 PROCEDURE — 88305 TISSUE EXAM BY PATHOLOGIST: CPT | Performed by: INTERNAL MEDICINE

## 2021-11-13 PROCEDURE — 25010000002 PROPOFOL 10 MG/ML EMULSION: Performed by: NURSE ANESTHETIST, CERTIFIED REGISTERED

## 2021-11-13 RX ORDER — SODIUM CHLORIDE 9 MG/ML
40 INJECTION, SOLUTION INTRAVENOUS AS NEEDED
Status: DISCONTINUED | OUTPATIENT
Start: 2021-11-13 | End: 2021-11-13 | Stop reason: HOSPADM

## 2021-11-13 RX ORDER — SODIUM CHLORIDE, SODIUM LACTATE, POTASSIUM CHLORIDE, CALCIUM CHLORIDE 600; 310; 30; 20 MG/100ML; MG/100ML; MG/100ML; MG/100ML
9 INJECTION, SOLUTION INTRAVENOUS CONTINUOUS
Status: DISCONTINUED | OUTPATIENT
Start: 2021-11-13 | End: 2021-11-13 | Stop reason: HOSPADM

## 2021-11-13 RX ORDER — PROPOFOL 10 MG/ML
VIAL (ML) INTRAVENOUS AS NEEDED
Status: DISCONTINUED | OUTPATIENT
Start: 2021-11-13 | End: 2021-11-13 | Stop reason: SURG

## 2021-11-13 RX ORDER — LIDOCAINE HYDROCHLORIDE 20 MG/ML
INJECTION, SOLUTION INFILTRATION; PERINEURAL AS NEEDED
Status: DISCONTINUED | OUTPATIENT
Start: 2021-11-13 | End: 2021-11-13 | Stop reason: SURG

## 2021-11-13 RX ORDER — SODIUM CHLORIDE 0.9 % (FLUSH) 0.9 %
10 SYRINGE (ML) INJECTION EVERY 12 HOURS SCHEDULED
Status: DISCONTINUED | OUTPATIENT
Start: 2021-11-13 | End: 2021-11-13 | Stop reason: HOSPADM

## 2021-11-13 RX ORDER — SODIUM CHLORIDE 0.9 % (FLUSH) 0.9 %
10 SYRINGE (ML) INJECTION AS NEEDED
Status: DISCONTINUED | OUTPATIENT
Start: 2021-11-13 | End: 2021-11-13 | Stop reason: HOSPADM

## 2021-11-13 RX ORDER — SODIUM CHLORIDE, SODIUM LACTATE, POTASSIUM CHLORIDE, CALCIUM CHLORIDE 600; 310; 30; 20 MG/100ML; MG/100ML; MG/100ML; MG/100ML
100 INJECTION, SOLUTION INTRAVENOUS CONTINUOUS
Status: DISCONTINUED | OUTPATIENT
Start: 2021-11-13 | End: 2021-11-13 | Stop reason: HOSPADM

## 2021-11-13 RX ORDER — LIDOCAINE HYDROCHLORIDE 10 MG/ML
0.5 INJECTION, SOLUTION EPIDURAL; INFILTRATION; INTRACAUDAL; PERINEURAL ONCE AS NEEDED
Status: DISCONTINUED | OUTPATIENT
Start: 2021-11-13 | End: 2021-11-13 | Stop reason: HOSPADM

## 2021-11-13 RX ORDER — MAGNESIUM HYDROXIDE 1200 MG/15ML
LIQUID ORAL AS NEEDED
Status: DISCONTINUED | OUTPATIENT
Start: 2021-11-13 | End: 2021-11-13 | Stop reason: HOSPADM

## 2021-11-13 RX ADMIN — PROPOFOL 50 MG: 10 INJECTION, EMULSION INTRAVENOUS at 09:49

## 2021-11-13 RX ADMIN — PROPOFOL 50 MG: 10 INJECTION, EMULSION INTRAVENOUS at 09:38

## 2021-11-13 RX ADMIN — PROPOFOL 50 MG: 10 INJECTION, EMULSION INTRAVENOUS at 09:43

## 2021-11-13 RX ADMIN — LIDOCAINE HYDROCHLORIDE 100 MG: 20 INJECTION, SOLUTION INFILTRATION; PERINEURAL at 09:29

## 2021-11-13 RX ADMIN — PROPOFOL 100 MG: 10 INJECTION, EMULSION INTRAVENOUS at 09:29

## 2021-11-13 RX ADMIN — PROPOFOL 50 MG: 10 INJECTION, EMULSION INTRAVENOUS at 09:35

## 2021-11-13 RX ADMIN — SODIUM CHLORIDE, POTASSIUM CHLORIDE, SODIUM LACTATE AND CALCIUM CHLORIDE 9 ML/HR: 600; 310; 30; 20 INJECTION, SOLUTION INTRAVENOUS at 08:31

## 2021-11-13 RX ADMIN — PROPOFOL 50 MG: 10 INJECTION, EMULSION INTRAVENOUS at 09:33

## 2021-11-13 NOTE — H&P
Patient Care Team:  Darlin Kirkland APRN as PCP - General (Family Medicine)  Mayte Vu MD as Consulting Physician (Cardiology)    CHIEF COMPLAINT: Family hx of CRC or polyps    HISTORY OF PRESENT ILLNESS:  Last exam >10 years    Past Medical History:   Diagnosis Date   • Atrial fibrillation (HCC)    • Chronic rhinitis 10/15/2019   • Hyperlipidemia    • Hypertension    • Left thyroid nodule 2016   • Sleep apnea    • Type 2 diabetes mellitus without complication, without long-term current use of insulin (HCC) 2019     Past Surgical History:   Procedure Laterality Date   • APPENDECTOMY     •  SECTION  1998   • COLONOSCOPY  2004    [facility]      Family History   Problem Relation Age of Onset   • Heart disease Mother    • Hypertension Mother    • Diabetes Mother    • Hypertension Father    • Melanoma Father    • Colon polyps Father    • Colon cancer Father    • Breast cancer Paternal Aunt      Social History     Tobacco Use   • Smoking status: Never Smoker   • Smokeless tobacco: Never Used   Substance Use Topics   • Alcohol use: Yes     Comment: socially   • Drug use: No     Medications Prior to Admission   Medication Sig Dispense Refill Last Dose   • Loratadine (CLARITIN PO) Take 1 tablet by mouth Daily.   11/10/2021 at Unknown time   • metFORMIN (GLUCOPHAGE) 500 MG tablet Take 2 tablets by mouth 2 (Two) Times a Day for 270 days. 360 tablet 2 2021 at Unknown time   • metoprolol succinate XL (TOPROL-XL) 200 MG 24 hr tablet Take 1 tablet by mouth Every Night. 90 tablet 3 2021 at Unknown time   • omeprazole (priLOSEC) 40 MG capsule Take 1 capsule by mouth Daily. 90 capsule 3 Past Week at Unknown time   • valsartan-hydrochlorothiazide (DIOVAN-HCT) 160-12.5 MG per tablet TAKE 1 TABLET BY MOUTH EVERY DAY 90 tablet 3 2021 at Unknown time   • vitamin D (ERGOCALCIFEROL) 1.25 MG (35987 UT) capsule capsule TAKE 1 CAPSULE BY MOUTH ONE TIME PER  WEEK 12 capsule 4 11/12/2021 at Unknown time   • atorvastatin (LIPITOR) 20 MG tablet TAKE 1 TABLET BY MOUTH EVERY DAY 14 tablet 0 11/11/2021   • clobetasol (TEMOVATE) 0.05 % cream Apply  topically to the appropriate area as directed 2 (Two) Times a Day. 60 g 1 More than a month at Unknown time   • diphenoxylate-atropine (Lomotil) 2.5-0.025 MG per tablet Take 1 tablet by mouth 4 (Four) Times a Day As Needed for Diarrhea. 60 tablet 0 More than a month at Unknown time   • fluticasone (FLONASE) 50 MCG/ACT nasal spray 2 sprays into each nostril Daily. 1 each 11 11/10/2021   • ondansetron ODT (ZOFRAN-ODT) 8 MG disintegrating tablet Place 1 tablet on the tongue Every 8 (Eight) Hours As Needed for Nausea or Vomiting. 20 tablet 0 More than a month at Unknown time   • Xarelto 20 MG tablet TAKE 1 TABLET BY MOUTH EVERY DAY 90 tablet 3 11/9/2021     Allergies:  Lisinopril, Sulfa antibiotics, and Latex    REVIEW OF SYSTEMS:  Please see the above history of present illness for pertinent positives and negatives.  The remainder of the patient's systems have been reviewed and are negative.     Vital Signs  Temp:  [98.7 °F (37.1 °C)] 98.7 °F (37.1 °C)  Heart Rate:  [60] 60  Resp:  [16] 16  BP: (149)/(78) 149/78    Flowsheet Rows      First Filed Value   Admission Height --   Admission Weight 99.8 kg (220 lb) Documented at 11/13/2021 0819           Physical Exam:  Physical Exam   Constitutional: Patient appears well-developed and well-nourished and in no acute distress   HEENT:   Head: Normocephalic and atraumatic.   Eyes:  Pupils are equal, round, and reactive to light. EOM are intact. Sclerae are anicteric and non-injected.  Mouth and Throat: Patient has moist mucous membranes. Oropharynx is clear of any erythema or exudate.     Neck: Neck supple. No JVD present. No thyromegaly present. No lymphadenopathy present.  Cardiovascular: Regular rate, regular rhythm, S1 normal and S2 normal.  Exam reveals no gallop and no friction rub.  No  murmur heard.  Pulmonary/Chest: Lungs are clear to auscultation bilaterally. No respiratory distress. No wheezes. No rhonchi. No rales.   Abdominal: Soft. Bowel sounds are normal. No distension and no mass. There is no hepatosplenomegaly. There is no tenderness.   Musculoskeletal: Normal Muscle tone  Extremities: No edema. Pulses are palpable in all 4 extremities.  Neurological: Patient is alert and oriented to person, place, and time. Cranial nerves II-XII are grossly intact with no focal deficits.  Skin: Skin is warm. No rash noted. Nails show no clubbing.  No cyanosis or erythema.    Debilities/Disabilities Identified: None  Emotional Behavior: Appropriate     Results Review:   I reviewed the patient's new clinical results.    Lab Results (most recent)     None          Imaging Results (Most Recent)     None        reviewed    ECG/EMG Results (most recent)     None        reviewed    Assessment/Plan   Family hx of CRC or polyps/  colonoscopy      I discussed the patient's findings and my recommendations with patient.     Lawrence Toussaint MD  11/13/21  08:34 EST    Time: 10 min prior to procedure.

## 2021-11-13 NOTE — OP NOTE
COLONOSCOPY WITH POLYPECTOMY  Procedure Report    Patient Name:  Claudia Gage  YOB: 1960    Date of Surgery:  11/13/2021     Indications:  Encounter for screening for malignant neoplasm of colon [Z12.11]      Pre-op Diagnosis:   Encounter for screening for malignant neoplasm of colon [Z12.11]    Post-Op Diagnosis Codes:     * Encounter for screening for malignant neoplasm of colon [Z12.11]     * Diverticulosis [K57.90]     * Colon polyp [K63.5]         Procedure/CPT® Codes:      Procedure(s):  COLONOSCOPY WITH POLYPECTOMY    Staff:  Surgeon(s):  Lawrence Toussaint MD         Anesthesia: Monitored Anesthesia Care    Estimated Blood Loss: none    Specimens:   ID Type Source Tests Collected by Time   A (Not marked as sent) : cecal polyp Polyp Large Intestine, Cecum TISSUE PATHOLOGY EXAM Lawrence Toussaint MD 11/13/2021 0939   B (Not marked as sent) : descending colon polyp Polyp Large Intestine, Left / Descending Colon TISSUE PATHOLOGY EXAM Lawrence Toussaint MD 11/13/2021 0949   C (Not marked as sent) : sigmoid colon polyp Polyp Large Intestine, Sigmoid Colon TISSUE PATHOLOGY EXAM Lawrence Toussaint MD 11/13/2021 0952   D (Not marked as sent) : rectal polyps x2 Polyp Large Intestine, Rectum TISSUE PATHOLOGY EXAM Lawrence Toussaint MD 11/13/2021 0954       Implants:    Nothing was implanted during the procedure      Description of Procedure: After having signed informed consent she was brought to the endoscopy suite and placed in the left lateral decubitus position, and given her IV sedation. Rectal exam revealed no external lesions, normal anal tone and no rectal mass.  The scope was introduced into the rectum and advanced under direct visualization from the rectum through the sigmoid colon, past scattered diverticular openings through the descending colon, to and around the splenic flexure, reduced and advanced through the transverse colon with some looping  to and around the hepatic flexure.  The scope was reduced in the ascending colon and then advanced easily into the cecum.  The cecum was well prepped, identified by the lack of appendiceal orifice and the ileocecal valve.  The cecum was well prepped and easily distended. There was a ball-shaped 8-9 mm polyp noted.  It was on the first fold of the cecum. This was removed with cold snare technique, retrieved with excellent hemostasis and sent as cecal polyp.  The scope was then withdrawn through the ascending colon, hepatic flexure, and transverse colon.  No mucosal lesions were noted there.  In the descending colon there was a sessile, 6 mm polyp noted there.  It was removed with cold biopsy forceps.  The scope was then withdrawn into the sigmoid colon.  There was a ball-shaped polyp there that was 4-5 mm.  It was biopsied and felt to be completely removed and sent separately as sigmoid colon polyp.  The scope was then withdrawn back into the rectum and there was a 4 x 6 mm polyp that was adenomatous appearing.  This was removed with cold biopsy forceps and felt to be completely removed.  There was a 2nd 4 mm sessile polyp noted.  This was biopsied as well.  They were sent together as rectal polyps x2.  The scope was retroflexed revealing an intact dentate line and no mucosal lesions.   The scope was deretroflexed and withdrawn.  The patient tolerated the procedure very well.          Findings: Colon to Cecum good Prep  Polyps-5-Cold Snare,Biopsy  Sigmoid Diverticulosis     Complications: None    Recommendations: Results to be called. and Repeat in 3 years      Lawrence Toussaint MD     Date: 11/13/2021  Time: 09:58 EST

## 2021-11-13 NOTE — BRIEF OP NOTE
COLONOSCOPY WITH POLYPECTOMY  Progress Note    Claudia Gage  11/13/2021    Pre-op Diagnosis:   Encounter for screening for malignant neoplasm of colon [Z12.11]       Post-Op Diagnosis Codes:     * Encounter for screening for malignant neoplasm of colon [Z12.11]     * Diverticulosis [K57.90]     * Colon polyp [K63.5]    Procedure/CPT® Codes:        Procedure(s):  COLONOSCOPY WITH POLYPECTOMY    Surgeon(s):  Lawrence Toussaint MD    Anesthesia: Monitored Anesthesia Care    Staff:   Circulator: Rosemary Rodriguez RN  Scrub Person: Mame Tavarez         Estimated Blood Loss: none    Urine Voided: * No values recorded between 11/13/2021  9:24 AM and 11/13/2021  9:54 AM *    Specimens:                Specimens     ID Source Type Tests Collected By Collected At Frozen?    A Large Intestine, Cecum Polyp · TISSUE PATHOLOGY EXAM   Lawrence Toussaint MD 11/13/21 0939     Description: cecal polyp    This specimen was not marked as sent.    B Large Intestine, Left / Descending Colon Polyp · TISSUE PATHOLOGY EXAM   Lawrence Toussaint MD 11/13/21 0949     Description: descending colon polyp    This specimen was not marked as sent.    C Large Intestine, Sigmoid Colon Polyp · TISSUE PATHOLOGY EXAM   Lawrence Toussaint MD 11/13/21 0952     Description: sigmoid colon polyp    This specimen was not marked as sent.    D Large Intestine, Rectum Polyp · TISSUE PATHOLOGY EXAM   Lawrence Toussaint MD 11/13/21 0954     Description: rectal polyps x2    This specimen was not marked as sent.                Drains: * No LDAs found *    Findings: Colon to Cecum good Prep  Polyps-5-Cold Snare,Biopsy  Sigmoid Diverticulosis    Complications: none          Lawrence Toussaint MD     Date: 11/13/2021  Time: 09:57 EST

## 2021-11-13 NOTE — ANESTHESIA POSTPROCEDURE EVALUATION
Patient: Claudia Gage    Procedure Summary     Date: 11/13/21 Room / Location: [facility] ENDOSCOPY 1 /  [facility]    Anesthesia Start: 0924 Anesthesia Stop: 0956    Procedure: COLONOSCOPY WITH POLYPECTOMY (N/A ) Diagnosis:       Encounter for screening for malignant neoplasm of colon      Diverticulosis      Colon polyp      (Encounter for screening for malignant neoplasm of colon [Z12.11])    Surgeons: Lawrence Toussaint MD Provider: Emiliano Russo CRNA    Anesthesia Type: MAC ASA Status: 2          Anesthesia Type: MAC    Vitals  Vitals Value Taken Time   /73 11/13/21 1003   Temp 98.5 °F (36.9 °C) 11/13/21 1003   Pulse 60 11/13/21 1003   Resp 9 11/13/21 1003   SpO2 100 % 11/13/21 1003           Post Anesthesia Care and Evaluation    Patient location during evaluation: PHASE II  Patient participation: complete - patient participated  Level of consciousness: awake and alert  Pain score: 0  Pain management: satisfactory to patient  Airway patency: patent  Anesthetic complications: No anesthetic complications  PONV Status: none  Cardiovascular status: acceptable  Respiratory status: acceptable  Hydration status: acceptable

## 2021-11-13 NOTE — ANESTHESIA PREPROCEDURE EVALUATION
Anesthesia Evaluation     Patient summary reviewed and Nursing notes reviewed   no history of anesthetic complications:  NPO Solid Status: > 8 hours  NPO Liquid Status: > 8 hours           Airway   Mallampati: I  TM distance: >3 FB  Neck ROM: full  No difficulty expected  Dental      Comment: Partials out    Pulmonary - normal exam    breath sounds clear to auscultation  (+) sleep apnea on CPAP,   Cardiovascular     PT is on anticoagulation therapy  Patient on routine beta blocker  Rhythm: regular  Rate: normal    (+) hypertension (meds last yesterday) well controlled 2 medications or greater, dysrhythmias Paroxysmal Atrial Fib, hyperlipidemia,     ROS comment: Claudia Gage MRN   [id number] Legal Sex   Female  (Age)   1960 (61 y.o.)      PACS Images     Show images for Adult Transthoracic Echo Complete W/ Cont if Necessary Per Protocol   Sedation Narrator Report      Sedation Narrator Report      Interpretation Summary    · Left ventricular systolic function is normal. Calculated EF = 69.0%. Estimated EF was in agreement with the calculated EF. Normal left ventricular cavity size and wall thickness noted. All left ventricular wall segments contract normally. Left ventricular diastolic function is normal.  · The aortic valve is not well visualized. The aortic valve is abnormal in structure. There is calcification of the aortic valve.No significant aortic valve regurgitation is present. No aortic valve stenosis is present.             Neuro/Psych- negative ROS  GI/Hepatic/Renal/Endo    (+)   diabetes mellitus () type 2 well controlled,   Thyroid problem: goiter.    Musculoskeletal     (+) back pain, radiculopathy Right lower extremity  Abdominal   (+) obese,     Abdomen: soft.   Substance History   Alcohol use: rare.     OB/GYN negative ob/gyn ROS         Other        ROS/Med Hx Other: Xarelto last 2021                Anesthesia Plan    ASA 2     MAC     intravenous induction     Anesthetic plan,  all risks, benefits, and alternatives have been provided, discussed and informed consent has been obtained with: patient.  Use of blood products discussed with patient  Consented to blood products.

## 2021-11-16 LAB
LAB AP CASE REPORT: NORMAL
PATH REPORT.FINAL DX SPEC: NORMAL
PATH REPORT.GROSS SPEC: NORMAL

## 2021-12-21 ENCOUNTER — OFFICE VISIT (OUTPATIENT)
Dept: CARDIOLOGY | Facility: CLINIC | Age: 61
End: 2021-12-21

## 2021-12-21 ENCOUNTER — LAB (OUTPATIENT)
Dept: LAB | Facility: HOSPITAL | Age: 61
End: 2021-12-21

## 2021-12-21 ENCOUNTER — HOSPITAL ENCOUNTER (OUTPATIENT)
Dept: CARDIOLOGY | Facility: HOSPITAL | Age: 61
Setting detail: HOSPITAL OUTPATIENT SURGERY
Discharge: HOME | End: 2021-12-21

## 2021-12-21 VITALS
BODY MASS INDEX: 35 KG/M2 | DIASTOLIC BLOOD PRESSURE: 82 MMHG | WEIGHT: 223 LBS | HEIGHT: 67 IN | HEART RATE: 89 BPM | SYSTOLIC BLOOD PRESSURE: 140 MMHG

## 2021-12-21 DIAGNOSIS — I10 PRIMARY HYPERTENSION: ICD-10-CM

## 2021-12-21 DIAGNOSIS — E78.2 MIXED HYPERLIPIDEMIA: ICD-10-CM

## 2021-12-21 DIAGNOSIS — I48.0 PAROXYSMAL ATRIAL FIBRILLATION (HCC): ICD-10-CM

## 2021-12-21 DIAGNOSIS — R25.2 LEG CRAMPS: ICD-10-CM

## 2021-12-21 DIAGNOSIS — I48.0 PAROXYSMAL ATRIAL FIBRILLATION (HCC): Primary | ICD-10-CM

## 2021-12-21 LAB
ANION GAP SERPL CALCULATED.3IONS-SCNC: 8.8 MMOL/L (ref 5–15)
BUN SERPL-MCNC: 17 MG/DL (ref 8–23)
BUN/CREAT SERPL: 16.2 (ref 7–25)
CALCIUM SPEC-SCNC: 9.7 MG/DL (ref 8.6–10.5)
CHLORIDE SERPL-SCNC: 102 MMOL/L (ref 98–107)
CO2 SERPL-SCNC: 27.2 MMOL/L (ref 22–29)
CREAT SERPL-MCNC: 1.05 MG/DL (ref 0.57–1)
GFR SERPL CREATININE-BSD FRML MDRD: 53 ML/MIN/1.73
GLUCOSE SERPL-MCNC: 136 MG/DL (ref 65–99)
POTASSIUM SERPL-SCNC: 4.4 MMOL/L (ref 3.5–5.2)
SODIUM SERPL-SCNC: 138 MMOL/L (ref 136–145)

## 2021-12-21 PROCEDURE — 99214 OFFICE O/P EST MOD 30 MIN: CPT | Performed by: NURSE PRACTITIONER

## 2021-12-21 PROCEDURE — 93225 XTRNL ECG REC<48 HRS REC: CPT

## 2021-12-21 PROCEDURE — 93226 XTRNL ECG REC<48 HR SCAN A/R: CPT

## 2021-12-21 PROCEDURE — 93000 ELECTROCARDIOGRAM COMPLETE: CPT | Performed by: NURSE PRACTITIONER

## 2021-12-21 PROCEDURE — 80048 BASIC METABOLIC PNL TOTAL CA: CPT

## 2021-12-21 PROCEDURE — 36415 COLL VENOUS BLD VENIPUNCTURE: CPT

## 2021-12-21 NOTE — PROGRESS NOTES
Date of Office Visit: 2021  Encounter Provider: QUIN Cordero  Place of Service: [facility] CARDIOLOGY  Patient Name: Cluadia Gage  :1960  Primary Cardiologist: Dr. Vu    Chief Complaint   Patient presents with   • paroxysmal afib   :     Dear Darlin    HPI: Claudia Gage is a pleasant 61 y.o. female who presents 2021 for cardiac follow up.  I have reviewed her past medical records including notes, labs and testing in preparation for today's visit.    She has a history of hypertension and hyperlipidemia and is on treatment for this. She also has obstructive sleep apnea for which she uses a CPAP. She has recurrent ear infections in the right ear but the other issue she has is some paroxysmal atrial fibrillation. She is in atrial fibrillation today. She is not on any blood thinner.       She used to follow with Dr. Gonzalez. She had an echocardiogram done there in 2015, which showed ejection fraction of 55% with mild left atrial dilation and normal cardiac valves.       She is not exercising and she has gained weight. She has followed with Dr. Briceno for her medical treatment.       She is noted to have a left thyroid nodule.       She does not smoke. She is  and works part-time and has four children who are grown. She has alcohol about once or twice per year. 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       Her mother  with coronary artery disease and she had hypertension and diabetes mellitus.        Cardiogram done 3/10/2020 showed ejection fraction 69%, normal diastolic function, normal cardiac chamber sizes, aortic valve calcification but no significant insufficiency or stenosis.      She presents today for her annual cardiac evaluation.  She has been experiencing more palpitations and feels the intensity has increased as well.  She does not have any shortness of breath or chest pressure accompanying these.  She denies any lower  extremity edema.  She is not had any dizziness, lightheadedness, syncope or presyncopal episodes.  She denies any fatigue.  She is compliant with her CPAP.  She is taking her medications as directed.  She also complains of leg cramps.  Have asked her to stop her Lipitor for 2 weeks and to call me.  A muscular check a BMP.       Past Medical History:   Diagnosis Date   • Atrial fibrillation (HCC)    • Chronic rhinitis 10/15/2019   • Hyperlipidemia    • Hypertension    • Left thyroid nodule 2016   • Sleep apnea    • Type 2 diabetes mellitus without complication, without long-term current use of insulin (HCC) 2019       Past Surgical History:   Procedure Laterality Date   • APPENDECTOMY     •  SECTION  1998   • COLONOSCOPY  2004    [facility]    • COLONOSCOPY W/ POLYPECTOMY N/A 2021    Procedure: COLONOSCOPY WITH POLYPECTOMY;  Surgeon: Lawrence Toussaint MD;  Location: [facility];  Service: Gastroenterology;  Laterality: N/A;  cecal polyp (cold snare)  descending colon polyp  sigmoid colon polyp  rectal polyps x2  diverticulosis       Social History     Socioeconomic History   • Marital status:    Tobacco Use   • Smoking status: Never Smoker   • Smokeless tobacco: Never Used   Substance and Sexual Activity   • Alcohol use: Yes     Comment: socially   • Drug use: No   • Sexual activity: Never       Family History   Problem Relation Age of Onset   • Heart disease Mother    • Hypertension Mother    • Diabetes Mother    • Hypertension Father    • Melanoma Father    • Colon polyps Father    • Colon cancer Father    • Breast cancer Paternal Aunt        The following portion of the patient's history were reviewed and updated as appropriate: past medical history, past surgical history, past social history, past family history, allergies, current medications, and problem list.    Review of Systems   Constitutional: Negative for diaphoresis, fever and  malaise/fatigue.   HENT: Negative for congestion, hearing loss, hoarse voice, nosebleeds and sore throat.    Eyes: Negative for photophobia, vision loss in left eye, vision loss in right eye and visual disturbance.   Cardiovascular: Positive for palpitations. Negative for chest pain, dyspnea on exertion, irregular heartbeat, leg swelling, near-syncope, orthopnea, paroxysmal nocturnal dyspnea and syncope.   Respiratory: Negative for cough, hemoptysis, shortness of breath, sleep disturbances due to breathing, snoring, sputum production and wheezing.    Endocrine: Negative for cold intolerance, heat intolerance, polydipsia, polyphagia and polyuria.   Hematologic/Lymphatic: Negative for bleeding problem. Does not bruise/bleed easily.   Skin: Negative for color change, dry skin, poor wound healing, rash and suspicious lesions.   Musculoskeletal: Positive for muscle cramps. Negative for arthritis, back pain, falls, gout, joint pain, joint swelling, muscle weakness and myalgias.   Gastrointestinal: Negative for bloating, abdominal pain, constipation, diarrhea, dysphagia, melena, nausea and vomiting.   Neurological: Negative for excessive daytime sleepiness, dizziness, headaches, light-headedness, loss of balance, numbness, paresthesias, seizures, vertigo and weakness.   Psychiatric/Behavioral: Negative for depression, memory loss and substance abuse. The patient is not nervous/anxious.        Allergies   Allergen Reactions   • Lisinopril Cough   • Sulfa Antibiotics Nausea And Vomiting   • Latex Rash         Current Outpatient Medications:   •  atorvastatin (LIPITOR) 20 MG tablet, TAKE 1 TABLET BY MOUTH EVERY DAY, Disp: 14 tablet, Rfl: 0  •  clobetasol (TEMOVATE) 0.05 % cream, Apply  topically to the appropriate area as directed 2 (Two) Times a Day., Disp: 60 g, Rfl: 1  •  fluticasone (FLONASE) 50 MCG/ACT nasal spray, 2 sprays into each nostril Daily., Disp: 1 each, Rfl: 11  •  Loratadine (CLARITIN PO), Take 1 tablet by  "mouth Daily., Disp: , Rfl:   •  metFORMIN (GLUCOPHAGE) 500 MG tablet, Take 2 tablets by mouth 2 (Two) Times a Day for 270 days., Disp: 360 tablet, Rfl: 2  •  metoprolol succinate XL (TOPROL-XL) 200 MG 24 hr tablet, Take 1 tablet by mouth Every Night., Disp: 90 tablet, Rfl: 3  •  omeprazole (priLOSEC) 40 MG capsule, Take 1 capsule by mouth Daily., Disp: 90 capsule, Rfl: 3  •  valsartan-hydrochlorothiazide (DIOVAN-HCT) 160-12.5 MG per tablet, TAKE 1 TABLET BY MOUTH EVERY DAY, Disp: 90 tablet, Rfl: 3  •  vitamin D (ERGOCALCIFEROL) 1.25 MG (55128 UT) capsule capsule, TAKE 1 CAPSULE BY MOUTH ONE TIME PER WEEK, Disp: 12 capsule, Rfl: 4  •  Xarelto 20 MG tablet, TAKE 1 TABLET BY MOUTH EVERY DAY, Disp: 90 tablet, Rfl: 3  •  diphenoxylate-atropine (Lomotil) 2.5-0.025 MG per tablet, Take 1 tablet by mouth 4 (Four) Times a Day As Needed for Diarrhea., Disp: 60 tablet, Rfl: 0  •  ondansetron ODT (ZOFRAN-ODT) 8 MG disintegrating tablet, Place 1 tablet on the tongue Every 8 (Eight) Hours As Needed for Nausea or Vomiting., Disp: 20 tablet, Rfl: 0        Objective:     Vitals:    12/21/21 1057   BP: 140/82   Pulse: 89   Weight: 101 kg (223 lb)   Height: 170.2 cm (67\")     Body mass index is 34.93 kg/m².      Vitals reviewed.   Constitutional:       General: Not in acute distress.     Appearance: Healthy appearance. Well-developed.   Eyes:      General:         Right eye: No discharge.         Left eye: No discharge.      Conjunctiva/sclera: Conjunctivae normal.   HENT:      Head: Normocephalic and atraumatic.      Right Ear: External ear normal.      Left Ear: External ear normal.      Nose: Nose normal.   Neck:      Thyroid: No thyromegaly.      Vascular: No JVD.      Trachea: No tracheal deviation.      Lymphadenopathy: No cervical adenopathy.   Pulmonary:      Effort: Pulmonary effort is normal. No respiratory distress.      Breath sounds: Normal breath sounds. No wheezing. No rales.   Chest:      Chest wall: Not tender to " palpatation.   Cardiovascular:      Normal rate. Irregularly irregular rhythm.      No gallop.   Pulses:     Intact distal pulses.   Edema:     Peripheral edema absent.   Abdominal:      General: There is no distension.      Palpations: Abdomen is soft.      Tenderness: There is no abdominal tenderness.   Musculoskeletal: Normal range of motion.         General: No tenderness or deformity.      Cervical back: Normal range of motion and neck supple. Skin:     General: Skin is warm and dry.      Findings: No erythema or rash.   Neurological:      Mental Status: Alert and oriented to person, place, and time.      Coordination: Coordination normal.   Psychiatric:         Attention and Perception: Attention normal.         Mood and Affect: Mood normal.         Speech: Speech normal.         Behavior: Behavior normal. Behavior is cooperative.         Thought Content: Thought content normal.         Cognition and Memory: Cognition normal.         Judgment: Judgment normal.               ECG 12 Lead    Date/Time: 12/21/2021 11:18 AM  Performed by: Mayte Miranda APRN  Authorized by: Mayte Miranda APRN   Comparison: compared with previous ECG from 12/21/2020  Similar to previous ECG  Rhythm: atrial fibrillation  Rate: normal  Conduction: conduction normal  ST Segments: ST segments normal  T Waves: T waves normal  QRS axis: normal    Clinical impression: abnormal EKG              Assessment:       Diagnosis Plan   1. Paroxysmal atrial fibrillation (HCC)     2. Primary hypertension  Basic Metabolic Panel   3. Mixed hyperlipidemia  Basic Metabolic Panel   4. Leg cramps            Plan:       1. Paroxysmal atrial fibrillation -she remains in atrial fibrillation.  She states she cannot always feel palpitations or her heart racing but feels like she has had increased palpitations with timing and intensity.  On Xarelto 20 mg daily. Toprol-XL to 200 mg nightly.  Check 48-hour Holter.    2. Hypertension.  Controlled  3.  Hyperlipidemia.  She is currently on lipid-lowering therapy.  Is she is complaining of leg cramps.  I have asked her to hold her atorvastatin for 2 weeks and to call me.  I am also going to check a BMP.  4. Obstructive sleep apnea.  States compliance  5. Recurrent right ear infections.   6. Obesity and sedentary lifestyle.  She would benefit from a weight loss program.  7. Family history of coronary artery of coronary artery disease in her mother.   8.  Diabetes mellitus type 2.    Check 48-hour Holter, stop Lipitor for 2 weeks secondary to leg cramps and call.  Check BMP  RTO in 1 year with RM    As always, it has been a pleasure to participate in your patient's care. Thank you.       Sincerely,       QUIN Cordero      Current Outpatient Medications:   •  atorvastatin (LIPITOR) 20 MG tablet, TAKE 1 TABLET BY MOUTH EVERY DAY, Disp: 14 tablet, Rfl: 0  •  clobetasol (TEMOVATE) 0.05 % cream, Apply  topically to the appropriate area as directed 2 (Two) Times a Day., Disp: 60 g, Rfl: 1  •  fluticasone (FLONASE) 50 MCG/ACT nasal spray, 2 sprays into each nostril Daily., Disp: 1 each, Rfl: 11  •  Loratadine (CLARITIN PO), Take 1 tablet by mouth Daily., Disp: , Rfl:   •  metFORMIN (GLUCOPHAGE) 500 MG tablet, Take 2 tablets by mouth 2 (Two) Times a Day for 270 days., Disp: 360 tablet, Rfl: 2  •  metoprolol succinate XL (TOPROL-XL) 200 MG 24 hr tablet, Take 1 tablet by mouth Every Night., Disp: 90 tablet, Rfl: 3  •  omeprazole (priLOSEC) 40 MG capsule, Take 1 capsule by mouth Daily., Disp: 90 capsule, Rfl: 3  •  valsartan-hydrochlorothiazide (DIOVAN-HCT) 160-12.5 MG per tablet, TAKE 1 TABLET BY MOUTH EVERY DAY, Disp: 90 tablet, Rfl: 3  •  vitamin D (ERGOCALCIFEROL) 1.25 MG (50785 UT) capsule capsule, TAKE 1 CAPSULE BY MOUTH ONE TIME PER WEEK, Disp: 12 capsule, Rfl: 4  •  Xarelto 20 MG tablet, TAKE 1 TABLET BY MOUTH EVERY DAY, Disp: 90 tablet, Rfl: 3  •  diphenoxylate-atropine (Lomotil) 2.5-0.025 MG per tablet, Take 1  tablet by mouth 4 (Four) Times a Day As Needed for Diarrhea., Disp: 60 tablet, Rfl: 0  •  ondansetron ODT (ZOFRAN-ODT) 8 MG disintegrating tablet, Place 1 tablet on the tongue Every 8 (Eight) Hours As Needed for Nausea or Vomiting., Disp: 20 tablet, Rfl: 0    Dictated utilizing Dragon dictation

## 2021-12-27 LAB
MAXIMAL PREDICTED HEART RATE: 159 BPM
STRESS TARGET HR: 135 BPM

## 2021-12-27 PROCEDURE — 93227 XTRNL ECG REC<48 HR R&I: CPT | Performed by: INTERNAL MEDICINE

## 2021-12-29 RX ORDER — DILTIAZEM HYDROCHLORIDE 120 MG/1
120 CAPSULE, COATED, EXTENDED RELEASE ORAL DAILY
Qty: 30 CAPSULE | Refills: 3 | Status: SHIPPED | OUTPATIENT
Start: 2021-12-29 | End: 2022-04-19

## 2021-12-29 NOTE — PROGRESS NOTES
Spoke to patient with results.  She is symptomatic with the increased heart rate.  She states she does get more easily fatigued and can feel her heart racing.  Will add Cardizem 120 mg daily.  She will continue her other medications.  I have asked her to check her blood pressure and monitor heart rate and get back to me in a week to 2 weeks.    Also she states her leg cramps are better off of the atorvastatin.  Is been about 1 week.  I have asked her to call back in a week and let me know if they have completely disappeared.  I did tell her that if she was still having them then it was not the atorvastatin calling them because once the antagonist is out of her system the leg cramps go away.  She will let me know.

## 2022-01-13 ENCOUNTER — TELEPHONE (OUTPATIENT)
Dept: CARDIOLOGY | Facility: CLINIC | Age: 62
End: 2022-01-13

## 2022-01-13 DIAGNOSIS — I48.0 PAROXYSMAL ATRIAL FIBRILLATION: Primary | ICD-10-CM

## 2022-01-13 NOTE — TELEPHONE ENCOUNTER
Lisa please call patient and tell her that I did not want her to stop the metoprolol but she was to just add the diltiazem to it.  Please call her and have her restart the metoprolol as well in addition to continuing the diltiazem.    Dr. Horace WADE

## 2022-01-13 NOTE — TELEPHONE ENCOUNTER
Pt calls to report that she still has mild leg cramps but are much better than when she was taking Lipitor.     Pt states that she has discontinued metoprolol and started cardizem and directed but reports no improvement in symptoms.    Pt states that she ran into Dr. Doherty last night and was advised to call the office and set up a cardioversion.     Pt has not been able to check vitals at home

## 2022-01-18 ENCOUNTER — TRANSCRIBE ORDERS (OUTPATIENT)
Dept: CARDIOLOGY | Facility: CLINIC | Age: 62
End: 2022-01-18

## 2022-01-18 DIAGNOSIS — Z01.818 OTHER SPECIFIED PRE-OPERATIVE EXAMINATION: Primary | ICD-10-CM

## 2022-01-19 ENCOUNTER — LAB (OUTPATIENT)
Dept: LAB | Facility: HOSPITAL | Age: 62
End: 2022-01-19

## 2022-01-19 DIAGNOSIS — Z01.818 OTHER SPECIFIED PRE-OPERATIVE EXAMINATION: ICD-10-CM

## 2022-01-19 LAB — SARS-COV-2 RNA PNL SPEC NAA+PROBE: NOT DETECTED

## 2022-01-19 PROCEDURE — 87635 SARS-COV-2 COVID-19 AMP PRB: CPT

## 2022-01-19 PROCEDURE — C9803 HOPD COVID-19 SPEC COLLECT: HCPCS

## 2022-01-20 ENCOUNTER — ANESTHESIA (OUTPATIENT)
Dept: POSTOP/PACU | Facility: HOSPITAL | Age: 62
End: 2022-01-20

## 2022-01-20 ENCOUNTER — ANESTHESIA EVENT (OUTPATIENT)
Dept: POSTOP/PACU | Facility: HOSPITAL | Age: 62
End: 2022-01-20

## 2022-01-20 ENCOUNTER — HOSPITAL ENCOUNTER (OUTPATIENT)
Dept: POSTOP/PACU | Facility: HOSPITAL | Age: 62
Discharge: HOME | End: 2022-01-20

## 2022-01-20 VITALS
OXYGEN SATURATION: 99 % | SYSTOLIC BLOOD PRESSURE: 156 MMHG | TEMPERATURE: 98.5 F | HEART RATE: 66 BPM | RESPIRATION RATE: 18 BRPM | DIASTOLIC BLOOD PRESSURE: 91 MMHG

## 2022-01-20 VITALS — DIASTOLIC BLOOD PRESSURE: 94 MMHG | OXYGEN SATURATION: 99 % | HEART RATE: 66 BPM | SYSTOLIC BLOOD PRESSURE: 131 MMHG

## 2022-01-20 DIAGNOSIS — I48.0 PAROXYSMAL ATRIAL FIBRILLATION: ICD-10-CM

## 2022-01-20 LAB
GLUCOSE BLDC GLUCOMTR-MCNC: 107 MG/DL (ref 70–130)
POTASSIUM SERPL-SCNC: 4.8 MMOL/L (ref 3.5–5.2)
QT INTERVAL: 396 MS
QT INTERVAL: 422 MS

## 2022-01-20 PROCEDURE — 84132 ASSAY OF SERUM POTASSIUM: CPT | Performed by: INTERNAL MEDICINE

## 2022-01-20 PROCEDURE — 92960 CARDIOVERSION ELECTRIC EXT: CPT

## 2022-01-20 PROCEDURE — 82962 GLUCOSE BLOOD TEST: CPT

## 2022-01-20 PROCEDURE — 0 LIDOCAINE 1 % SOLUTION: Performed by: ANESTHESIOLOGY

## 2022-01-20 PROCEDURE — 25010000002 PROPOFOL 10 MG/ML EMULSION: Performed by: ANESTHESIOLOGY

## 2022-01-20 PROCEDURE — S0260 H&P FOR SURGERY: HCPCS | Performed by: INTERNAL MEDICINE

## 2022-01-20 PROCEDURE — 93010 ELECTROCARDIOGRAM REPORT: CPT | Performed by: INTERNAL MEDICINE

## 2022-01-20 PROCEDURE — 92960 CARDIOVERSION ELECTRIC EXT: CPT | Performed by: INTERNAL MEDICINE

## 2022-01-20 PROCEDURE — 93005 ELECTROCARDIOGRAM TRACING: CPT | Performed by: INTERNAL MEDICINE

## 2022-01-20 RX ORDER — LIDOCAINE HYDROCHLORIDE 10 MG/ML
INJECTION, SOLUTION INFILTRATION; PERINEURAL AS NEEDED
Status: DISCONTINUED | OUTPATIENT
Start: 2022-01-20 | End: 2022-01-20 | Stop reason: SURG

## 2022-01-20 RX ORDER — PROPOFOL 10 MG/ML
VIAL (ML) INTRAVENOUS AS NEEDED
Status: DISCONTINUED | OUTPATIENT
Start: 2022-01-20 | End: 2022-01-20 | Stop reason: SURG

## 2022-01-20 RX ORDER — SODIUM CHLORIDE, SODIUM LACTATE, POTASSIUM CHLORIDE, CALCIUM CHLORIDE 600; 310; 30; 20 MG/100ML; MG/100ML; MG/100ML; MG/100ML
INJECTION, SOLUTION INTRAVENOUS CONTINUOUS PRN
Status: DISCONTINUED | OUTPATIENT
Start: 2022-01-20 | End: 2022-01-20 | Stop reason: SURG

## 2022-01-20 RX ADMIN — LIDOCAINE HYDROCHLORIDE 60 ML: 10 INJECTION, SOLUTION INFILTRATION; PERINEURAL at 07:34

## 2022-01-20 RX ADMIN — PROPOFOL 20 MG: 10 INJECTION, EMULSION INTRAVENOUS at 07:35

## 2022-01-20 RX ADMIN — PROPOFOL 100 MG: 10 INJECTION, EMULSION INTRAVENOUS at 07:34

## 2022-01-20 RX ADMIN — SODIUM CHLORIDE, POTASSIUM CHLORIDE, SODIUM LACTATE AND CALCIUM CHLORIDE: 600; 310; 30; 20 INJECTION, SOLUTION INTRAVENOUS at 07:27

## 2022-01-20 NOTE — ANESTHESIA PREPROCEDURE EVALUATION
Anesthesia Evaluation     history of anesthetic complications:  NPO Solid Status: > 8 hours  NPO Liquid Status: > 2 hours           Airway   Mallampati: II  Dental - normal exam     Pulmonary - normal exam   (+) sleep apnea on CPAP,   Cardiovascular     Rhythm: irregular  Rate: normal    (+) hypertension, dysrhythmias Atrial Fib,       Neuro/Psych  GI/Hepatic/Renal/Endo    (+)   diabetes mellitus,     Musculoskeletal     Abdominal    Substance History      OB/GYN          Other                        Anesthesia Plan    ASA 3     MAC       Anesthetic plan, all risks, benefits, and alternatives have been provided, discussed and informed consent has been obtained with: patient and spouse/significant other.        CODE STATUS:

## 2022-01-20 NOTE — PROCEDURES
Patient came to the PACU after an overnight fast.  Informed written consent was obtained.  She underwent moderate conscious sedation with propofol per anesthesia.  She then underwent biphasic synchronized cardioversion at 200 J and converted from atrial fibrillation back to normal sinus rhythm after 1 attempt.    Patient tolerated procedure well.  No complications.

## 2022-01-20 NOTE — ANESTHESIA POSTPROCEDURE EVALUATION
Patient: Claudia Gage    Procedure Summary     Date: 01/20/22 Room / Location: [facility] PACU    Anesthesia Start: 0729 Anesthesia Stop: 0741    Procedure: CARDIOVERSION EXTERNAL IN CARDIOLOGY DEPARTMENT Diagnosis:       Paroxysmal atrial fibrillation (HCC)      (uncontrolled a fib)    Scheduled Providers: Mayte Vu MD Provider: Keon Tapia MD    Anesthesia Type: MAC ASA Status: 3          Anesthesia Type: MAC    Vitals  Vitals Value Taken Time   /82 01/20/22 0751   Temp     Pulse 66 01/20/22 0759   Resp 18 01/20/22 0750   SpO2 96 % 01/20/22 0759   Vitals shown include unvalidated device data.        Post Anesthesia Care and Evaluation    Patient location during evaluation: bedside  Patient participation: complete - patient participated  Level of consciousness: awake and alert  Pain management: adequate  Airway patency: patent  Anesthetic complications: No anesthetic complications    Cardiovascular status: acceptable  Respiratory status: acceptable  Hydration status: acceptable    Comments: /92 (BP Location: Left arm, Patient Position: Lying)   Pulse 66   Temp 36.9 °C (98.5 °F) (Oral)   Resp 16   SpO2 100%     Patient is stable postoperatively and has adequately recovered from anesthesia as described above unless otherwise noted

## 2022-01-20 NOTE — H&P
Date of Hospital Visit: [unfilled]ENC@  Encounter Provider: Mayte Vu MD  Place of Service: [facility] CARDIOLOGY  Patient Name: Claudia Gage  :1960  Referral Provider: Mayte Miranda APRN    Chief complaint    Atrial fibrillation    History of Present Illness    She has a history of hypertension and hyperlipidemia and is on treatment for this. She also has obstructive sleep apnea for which she uses a CPAP. She has recurrent ear infections in the right ear but the other issue she has is some paroxysmal atrial fibrillation. She is in atrial fibrillation today. She is not on any blood thinner.       She used to follow with Dr. Gonzalez. She had an echocardiogram done there in 2015, which showed ejection fraction of 55% with mild left atrial dilation and normal cardiac valves.       She is not exercising and she has gained weight. She has followed with Dr. Briceno for her medical treatment.       She is noted to have a left thyroid nodule.       She does not smoke. She is  and works part-time and has four children who are grown. She has alcohol about once or twice per year. 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       Her mother  with coronary artery disease and she had hypertension and diabetes mellitus.      Echocardiogram done 3/10/2020 showed ejection fraction 69%, normal diastolic function, normal cardiac chamber sizes, aortic valve calcification but no significant insufficiency or stenosis.       She has had dyspnea and fatigue and is in atrial fibrillation.  She has no chest pain this morning but can feel her heart is irregular.  She has had no dizziness, nausea vomiting, fever chills or cough.  She is taking her medications as directed without difficulty.    Past Medical History:   Diagnosis Date   • Atrial fibrillation (HCC)    • Chronic rhinitis 10/15/2019   • Hyperlipidemia    • Hypertension    • Left thyroid nodule 2016   •  Sleep apnea    • Type 2 diabetes mellitus without complication, without long-term current use of insulin (HCC) 2019       Past Surgical History:   Procedure Laterality Date   • APPENDECTOMY  1971   •  SECTION  1998   • COLONOSCOPY  2004    [facility]    • COLONOSCOPY W/ POLYPECTOMY N/A 2021    Procedure: COLONOSCOPY WITH POLYPECTOMY;  Surgeon: Lawrence Toussaint MD;  Location: [facility];  Service: Gastroenterology;  Laterality: N/A;  cecal polyp (cold snare)  descending colon polyp  sigmoid colon polyp  rectal polyps x2  diverticulosis       (Not in a hospital admission)      Current Meds  Scheduled Meds:  Continuous Infusions:No current facility-administered medications for this encounter.    PRN Meds:.    Allergies as of 2022 - Reviewed 2022   Allergen Reaction Noted   • Lisinopril Cough 2016   • Sulfa antibiotics Nausea And Vomiting 2013   • Latex Rash 2013       Social History     Socioeconomic History   • Marital status:    Tobacco Use   • Smoking status: Never Smoker   • Smokeless tobacco: Never Used   Substance and Sexual Activity   • Alcohol use: Yes     Comment: socially   • Drug use: No   • Sexual activity: Never       Family History   Problem Relation Age of Onset   • Heart disease Mother    • Hypertension Mother    • Diabetes Mother    • Hypertension Father    • Melanoma Father    • Colon polyps Father    • Colon cancer Father    • Breast cancer Paternal Aunt        REVIEW OF SYSTEMS:   12 point ROS was performed and is negative except as outlined in HPI          Objective:   Temp:  [98.5 °F (36.9 °C)] 98.5 °F (36.9 °C)  Heart Rate:  [77] 77  Resp:  [20] 20  BP: (147)/(97) 147/97  There is no height or weight on file to calculate BMI.    Vitals:    22 0558   BP: 147/97   Pulse: 77   Resp: 20   Temp: 98.5 °F (36.9 °C)   SpO2: 98%       General Appearance:    Alert, cooperative, in no acute distress   Head:     Normocephalic, without obvious abnormality, atraumatic   Eyes:            Lids and lashes normal, conjunctivae and sclerae normal, no   icterus, no pallor   Ears:    Ears appear intact with no abnormalities noted   Throat:   No oral lesions, no thrush, oral mucosa moist   Neck:   No adenopathy, supple, trachea midline, no thyromegaly, no   carotid bruit, no JVD   Back:     No kyphosis present, no scoliosis present, no skin lesions, erythema or scars, no tenderness to palpation, range of motion normal   Lungs:     Clear to auscultation,respirations regular, even and unlabored    Heart:    irregular rhythm and normal rate, normal S1 and S2, no murmur, no gallop, no rub, no click   Chest Wall:    No abnormalities observed   Abdomen:     Normal bowel sounds, no masses, no organomegaly, soft, nontender, nondistended, no guarding, no rebound  tenderness   Extremities:   Moves all extremities well, no edema, no cyanosis, no redness   Pulses:   Pulses palpable and equal bilaterally. Normal radial, carotid, dorsalis pedis and posterior tibial pulses bilaterally.    Skin:  Psychiatric:   No bleeding, bruising or rash    Alert and oriented x 3, normal mood and affect                 Lab Review:      Results from last 7 days   Lab Units 01/20/22  0608   POTASSIUM mmol/L 4.8         I personally viewed and interpreted the patient's EKG/Telemetry data  )  Patient Active Problem List   Diagnosis   • HTN (hypertension)   • HLD (hyperlipidemia)   • Left thyroid nodule   • Paroxysmal atrial fibrillation (HCC)   • Goiter   • Vitamin D deficiency   • Sleep apnea   • Chronic rhinitis   • Type 2 diabetes mellitus without complication, without long-term current use of insulin (HCC)   • Diarrhea of presumed infectious origin   • Encounter for screening for malignant neoplasm of colon     Assessment and Plan:    1. Paroxysmal atrial fibrillation, on Xarelto 20 mg daily, onToprol-XL to 200 mg nightly and diltiazem.  2.  Hypertension. Controlled.   3. Hyperlipidemia. On Atorvastatin.,   4. Obstructive sleep apnea. On CPAP.   5. Recurrent right ear infections.   6. Obesity and sedentary lifestyle.   7. Family history of coronary artery of coronary artery disease in her mother.   8.  Diabetes mellitus type 2.    Cardioversion today.     Mayte Vu MD  01/20/22  07:07 EST.  Time spent in reviewing chart, discussion and examination:

## 2022-01-22 DIAGNOSIS — E78.5 HYPERLIPIDEMIA, UNSPECIFIED HYPERLIPIDEMIA TYPE: ICD-10-CM

## 2022-01-22 RX ORDER — ATORVASTATIN CALCIUM 20 MG/1
TABLET, FILM COATED ORAL
Qty: 90 TABLET | Refills: 1 | Status: SHIPPED | OUTPATIENT
Start: 2022-01-22 | End: 2022-01-26

## 2022-01-24 RX ORDER — RIVAROXABAN 20 MG/1
TABLET, FILM COATED ORAL
Qty: 90 TABLET | Refills: 3 | Status: SHIPPED | OUTPATIENT
Start: 2022-01-24 | End: 2023-01-09

## 2022-01-24 RX ORDER — VALSARTAN AND HYDROCHLOROTHIAZIDE 160; 12.5 MG/1; MG/1
TABLET, FILM COATED ORAL
Qty: 90 TABLET | Refills: 3 | Status: SHIPPED | OUTPATIENT
Start: 2022-01-24 | End: 2023-01-09

## 2022-01-24 RX ORDER — METOPROLOL SUCCINATE 200 MG/1
TABLET, EXTENDED RELEASE ORAL
Qty: 90 TABLET | Refills: 3 | Status: SHIPPED | OUTPATIENT
Start: 2022-01-24 | End: 2023-01-09 | Stop reason: SDUPTHER

## 2022-01-26 ENCOUNTER — OFFICE VISIT (OUTPATIENT)
Dept: CARDIOLOGY | Facility: CLINIC | Age: 62
End: 2022-01-26

## 2022-01-26 VITALS
SYSTOLIC BLOOD PRESSURE: 140 MMHG | RESPIRATION RATE: 16 BRPM | HEART RATE: 70 BPM | BODY MASS INDEX: 35.63 KG/M2 | HEIGHT: 67 IN | DIASTOLIC BLOOD PRESSURE: 78 MMHG | WEIGHT: 227 LBS | OXYGEN SATURATION: 97 %

## 2022-01-26 DIAGNOSIS — I10 PRIMARY HYPERTENSION: ICD-10-CM

## 2022-01-26 DIAGNOSIS — G47.30 SLEEP APNEA, UNSPECIFIED TYPE: ICD-10-CM

## 2022-01-26 DIAGNOSIS — I48.0 PAROXYSMAL ATRIAL FIBRILLATION: Primary | ICD-10-CM

## 2022-01-26 DIAGNOSIS — E78.2 MIXED HYPERLIPIDEMIA: ICD-10-CM

## 2022-01-26 PROCEDURE — 99214 OFFICE O/P EST MOD 30 MIN: CPT | Performed by: NURSE PRACTITIONER

## 2022-01-26 PROCEDURE — 93000 ELECTROCARDIOGRAM COMPLETE: CPT | Performed by: NURSE PRACTITIONER

## 2022-01-26 RX ORDER — SACCHAROMYCES BOULARDII 250 MG
250 CAPSULE ORAL 2 TIMES DAILY
COMMUNITY
End: 2023-01-06

## 2022-01-26 RX ORDER — ZINC GLUCONATE 50 MG
TABLET ORAL
COMMUNITY

## 2022-01-26 NOTE — PROGRESS NOTES
Date of Office Visit: 2022  Encounter Provider: QUIN Cordero  Place of Service: [facility] CARDIOLOGY  Patient Name: Claudia Gage  :1960  Primary Cardiologist: Dr. Vu    CC:  1 month follow up     Dear      HPI: Claudia Gage is a pleasant 61 y.o. female who presents 2022 for cardiac follow up.  Reviewed her past medical records including notes, labs testing in preparation for today's visit.     She has a history of hypertension and hyperlipidemia and is on treatment for this. She also has obstructive sleep apnea for which she uses a CPAP. She has recurrent ear infections in the right ear but the other issue she has is some paroxysmal atrial fibrillation. She is in atrial fibrillation today. She is not on any blood thinner.       She used to follow with Dr. Gonzalez. She had an echocardiogram done there in 2015, which showed ejection fraction of 55% with mild left atrial dilation and normal cardiac valves.        She is noted to have a left thyroid nodule.       She does not smoke. She is  and works part-time and has four children who are grown. She has alcohol about once or twice per year. 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       Her mother  with coronary artery disease and she had hypertension and diabetes mellitus.        Cardiogram done 3/10/2020 showed ejection fraction 69%, normal diastolic function, normal cardiac chamber sizes, aortic valve calcification but no significant insufficiency or stenosis.      I saw her on 2021 for her annual cardiac evaluation.  She had been experiencing more palpitations and felt the intensity had increased as well.  She does not have any shortness of breath or chest pressure accompanying these.  She denies any lower extremity edema.  She has not had any dizziness, lightheadedness, syncope or presyncopal episodes.  She denies any fatigue.  She is compliant with her CPAP.   She is taking her medications as directed.  She also complains of leg cramps.  Have asked her to stop her Lipitor for 2 weeks and to call me. I checked a BMP and Holter.    Holter 2021 Interpretation Summary  · An abnormal monitor study.  · Atrial fibrillation is present throughout  · Average heart rate 97 bpm, range  bpm  · No significant pauses are seen.    Based on her Holter, she underwent successful cardioversion on 2022 with Dr. Vu.    She returns today in follow-up.  She states she can really tell a difference.  She may occasionally have a palpitation but it is not nearly the same.  She denies any shortness of breath, lower extremity edema, dizziness or lightheadedness.  She is not had any syncope or presyncopal episodes.  She denies any chest pain chest pressure or fatigue.  Her blood pressure is controlled.  She is taking her medications as directed.  She is compliant with her CPAP.  She states that her muscle cramps went away after stopping the atorvastatin.  I will have her get fasting labs and we can decide our next course of action to treat her hyperlipidemia.      Past Medical History:   Diagnosis Date   • Atrial fibrillation (HCC)    • Chronic rhinitis 10/15/2019   • Hyperlipidemia    • Hypertension    • Left thyroid nodule 2016   • Sleep apnea    • Type 2 diabetes mellitus without complication, without long-term current use of insulin (HCC) 2019       Past Surgical History:   Procedure Laterality Date   • APPENDECTOMY     •  SECTION  1998   • COLONOSCOPY  2004    [facility]    • COLONOSCOPY W/ POLYPECTOMY N/A 2021    Procedure: COLONOSCOPY WITH POLYPECTOMY;  Surgeon: Lawrence Toussaint MD;  Location: [facility] [place];  Service: Gastroenterology;  Laterality: N/A;  cecal polyp (cold snare)  descending colon polyp  sigmoid colon polyp  rectal polyps x2  diverticulosis       Social History     Socioeconomic History   •  Marital status:    Tobacco Use   • Smoking status: Never Smoker   • Smokeless tobacco: Never Used   Substance and Sexual Activity   • Alcohol use: Yes     Comment: socially   • Drug use: No   • Sexual activity: Never       Family History   Problem Relation Age of Onset   • Heart disease Mother    • Hypertension Mother    • Diabetes Mother    • Hypertension Father    • Melanoma Father    • Colon polyps Father    • Colon cancer Father    • Breast cancer Paternal Aunt        The following portion of the patient's history were reviewed and updated as appropriate: past medical history, past surgical history, past social history, past family history, allergies, current medications, and problem list.    Review of Systems   Constitutional: Negative for diaphoresis, fever and malaise/fatigue.   HENT: Negative for congestion, hearing loss, hoarse voice, nosebleeds and sore throat.    Eyes: Negative for photophobia, vision loss in left eye, vision loss in right eye and visual disturbance.   Cardiovascular: Positive for palpitations (occ). Negative for chest pain, dyspnea on exertion, irregular heartbeat, leg swelling, near-syncope, orthopnea, paroxysmal nocturnal dyspnea and syncope.   Respiratory: Negative for cough, hemoptysis, shortness of breath, sleep disturbances due to breathing, snoring, sputum production and wheezing.    Endocrine: Negative for cold intolerance, heat intolerance, polydipsia, polyphagia and polyuria.   Hematologic/Lymphatic: Negative for bleeding problem. Does not bruise/bleed easily.   Skin: Negative for color change, dry skin, poor wound healing, rash and suspicious lesions.   Musculoskeletal: Negative for arthritis, back pain, falls, gout, joint pain, joint swelling, muscle cramps, muscle weakness and myalgias.   Gastrointestinal: Negative for bloating, abdominal pain, constipation, diarrhea, dysphagia, melena, nausea and vomiting.   Neurological: Negative for excessive daytime sleepiness,  "dizziness, headaches, light-headedness, loss of balance, numbness, paresthesias, seizures, vertigo and weakness.   Psychiatric/Behavioral: Negative for depression, memory loss and substance abuse. The patient is not nervous/anxious.        Allergies   Allergen Reactions   • Lisinopril Cough   • Sulfa Antibiotics Nausea And Vomiting   • Latex Rash         Current Outpatient Medications:   •  clobetasol (TEMOVATE) 0.05 % cream, Apply  topically to the appropriate area as directed 2 (Two) Times a Day., Disp: 60 g, Rfl: 1  •  dilTIAZem CD (Cardizem CD) 120 MG 24 hr capsule, Take 1 capsule by mouth Daily., Disp: 30 capsule, Rfl: 3  •  diphenoxylate-atropine (Lomotil) 2.5-0.025 MG per tablet, Take 1 tablet by mouth 4 (Four) Times a Day As Needed for Diarrhea., Disp: 60 tablet, Rfl: 0  •  fluticasone (FLONASE) 50 MCG/ACT nasal spray, 2 sprays into each nostril Daily., Disp: 1 each, Rfl: 11  •  Loratadine (CLARITIN PO), Take 1 tablet by mouth Daily., Disp: , Rfl:   •  metFORMIN (GLUCOPHAGE) 500 MG tablet, Take 2 tablets by mouth 2 (Two) Times a Day for 270 days., Disp: 360 tablet, Rfl: 2  •  metoprolol succinate XL (TOPROL-XL) 200 MG 24 hr tablet, TAKE 1 TABLET BY MOUTH EVERY DAY AT NIGHT, Disp: 90 tablet, Rfl: 3  •  saccharomyces boulardii (FLORASTOR) 250 MG capsule, Take 250 mg by mouth 2 (Two) Times a Day., Disp: , Rfl:   •  valsartan-hydrochlorothiazide (DIOVAN-HCT) 160-12.5 MG per tablet, TAKE 1 TABLET BY MOUTH EVERY DAY, Disp: 90 tablet, Rfl: 3  •  vitamin D (ERGOCALCIFEROL) 1.25 MG (79812 UT) capsule capsule, TAKE 1 CAPSULE BY MOUTH ONE TIME PER WEEK, Disp: 12 capsule, Rfl: 4  •  Xarelto 20 MG tablet, TAKE 1 TABLET BY MOUTH EVERY DAY, Disp: 90 tablet, Rfl: 3  •  Zinc 50 MG tablet, Take  by mouth., Disp: , Rfl:         Objective:     Vitals:    01/26/22 1430   BP: 140/78   Pulse: 70   Resp: 16   SpO2: 97%   Weight: 103 kg (227 lb)   Height: 170.2 cm (67\")     Body mass index is 35.55 kg/m².      Vitals reviewed. "   Constitutional:       General: Not in acute distress.     Appearance: Healthy appearance. Well-developed.   Eyes:      General:         Right eye: No discharge.         Left eye: No discharge.      Conjunctiva/sclera: Conjunctivae normal.   HENT:      Head: Normocephalic and atraumatic.      Right Ear: External ear normal.      Left Ear: External ear normal.      Nose: Nose normal.   Neck:      Thyroid: No thyromegaly.      Vascular: No JVD.      Trachea: No tracheal deviation.      Lymphadenopathy: No cervical adenopathy.   Pulmonary:      Effort: Pulmonary effort is normal. No respiratory distress.      Breath sounds: Normal breath sounds. No wheezing. No rales.   Chest:      Chest wall: Not tender to palpatation.   Cardiovascular:      Normal rate. Regular rhythm.      No gallop.   Pulses:     Intact distal pulses.   Edema:     Peripheral edema absent.   Abdominal:      General: There is no distension.      Palpations: Abdomen is soft.      Tenderness: There is no abdominal tenderness.   Musculoskeletal: Normal range of motion.         General: No tenderness or deformity.      Cervical back: Normal range of motion and neck supple. Skin:     General: Skin is warm and dry.      Findings: No erythema or rash.   Neurological:      Mental Status: Alert and oriented to person, place, and time.      Coordination: Coordination normal.   Psychiatric:         Attention and Perception: Attention normal.         Mood and Affect: Mood normal.         Speech: Speech normal.         Behavior: Behavior normal.         Thought Content: Thought content normal.         Cognition and Memory: Cognition normal.         Judgment: Judgment normal.               ECG 12 Lead    Date/Time: 1/26/2022 5:09 PM  Performed by: Mayte Miranda APRN  Authorized by: Mayte Miranda APRN   Comparison: compared with previous ECG from 12/21/2021  Rhythm: sinus rhythm  Rate: normal  Conduction: conduction normal  ST Segments: ST segments normal  T  Waves: T waves normal  QRS axis: normal  Other findings: left atrial abnormality    Clinical impression: non-specific ECG              Assessment:       Diagnosis Plan   1. Paroxysmal atrial fibrillation (HCC)     2. Primary hypertension     3. Mixed hyperlipidemia     4. Sleep apnea, unspecified type            Plan:       1. Paroxysmal atrial fibrillation -he is back in sinus rhythm.  She had a successful cardioversion 1/20/2022.  She remains on diltiazem 120 mg, metoprolol succinate 200 mg daily, and Xarelto 20.  2. Hypertension-controlled  3. Hyperlipidemia.  She is currently on lipid-lowering therapy.  After stopping the Lipitor, she states her leg cramps went away.  We will check fasting lipids and then rechallenge with a different statin.  4. Obstructive sleep apnea.   States compliance  5. Recurrent right ear infections.   6. Obesity and sedentary lifestyle.  She would benefit from a weight loss program.  7. Family history of coronary artery of coronary artery disease in her mother.   8.  Diabetes mellitus type 2.    Check lipids  RTO in 6 months with RM    As always, it has been a pleasure to participate in your patient's care. Thank you.       Sincerely,       QUIN Cordero      Current Outpatient Medications:   •  clobetasol (TEMOVATE) 0.05 % cream, Apply  topically to the appropriate area as directed 2 (Two) Times a Day., Disp: 60 g, Rfl: 1  •  dilTIAZem CD (Cardizem CD) 120 MG 24 hr capsule, Take 1 capsule by mouth Daily., Disp: 30 capsule, Rfl: 3  •  diphenoxylate-atropine (Lomotil) 2.5-0.025 MG per tablet, Take 1 tablet by mouth 4 (Four) Times a Day As Needed for Diarrhea., Disp: 60 tablet, Rfl: 0  •  fluticasone (FLONASE) 50 MCG/ACT nasal spray, 2 sprays into each nostril Daily., Disp: 1 each, Rfl: 11  •  Loratadine (CLARITIN PO), Take 1 tablet by mouth Daily., Disp: , Rfl:   •  metFORMIN (GLUCOPHAGE) 500 MG tablet, Take 2 tablets by mouth 2 (Two) Times a Day for 270 days., Disp: 360 tablet, Rfl:  2  •  metoprolol succinate XL (TOPROL-XL) 200 MG 24 hr tablet, TAKE 1 TABLET BY MOUTH EVERY DAY AT NIGHT, Disp: 90 tablet, Rfl: 3  •  saccharomyces boulardii (FLORASTOR) 250 MG capsule, Take 250 mg by mouth 2 (Two) Times a Day., Disp: , Rfl:   •  valsartan-hydrochlorothiazide (DIOVAN-HCT) 160-12.5 MG per tablet, TAKE 1 TABLET BY MOUTH EVERY DAY, Disp: 90 tablet, Rfl: 3  •  vitamin D (ERGOCALCIFEROL) 1.25 MG (25730 UT) capsule capsule, TAKE 1 CAPSULE BY MOUTH ONE TIME PER WEEK, Disp: 12 capsule, Rfl: 4  •  Xarelto 20 MG tablet, TAKE 1 TABLET BY MOUTH EVERY DAY, Disp: 90 tablet, Rfl: 3  •  Zinc 50 MG tablet, Take  by mouth., Disp: , Rfl:       Dictated utilizing Dragon dictation

## 2022-01-27 ENCOUNTER — LAB (OUTPATIENT)
Dept: LAB | Facility: HOSPITAL | Age: 62
End: 2022-01-27

## 2022-01-27 DIAGNOSIS — E78.2 MIXED HYPERLIPIDEMIA: ICD-10-CM

## 2022-01-27 LAB
ALBUMIN SERPL-MCNC: 4.2 G/DL (ref 3.5–5.2)
ALBUMIN/GLOB SERPL: 1.3 G/DL
ALP SERPL-CCNC: 74 U/L (ref 39–117)
ALT SERPL W P-5'-P-CCNC: 18 U/L (ref 1–33)
ANION GAP SERPL CALCULATED.3IONS-SCNC: 10.7 MMOL/L (ref 5–15)
AST SERPL-CCNC: 18 U/L (ref 1–32)
BILIRUB SERPL-MCNC: 0.6 MG/DL (ref 0–1.2)
BUN SERPL-MCNC: 16 MG/DL (ref 8–23)
BUN/CREAT SERPL: 17.4 (ref 7–25)
CALCIUM SPEC-SCNC: 9.8 MG/DL (ref 8.6–10.5)
CHLORIDE SERPL-SCNC: 101 MMOL/L (ref 98–107)
CHOLEST SERPL-MCNC: 246 MG/DL (ref 0–200)
CO2 SERPL-SCNC: 26.3 MMOL/L (ref 22–29)
CREAT SERPL-MCNC: 0.92 MG/DL (ref 0.57–1)
GFR SERPL CREATININE-BSD FRML MDRD: 62 ML/MIN/1.73
GLOBULIN UR ELPH-MCNC: 3.2 GM/DL
GLUCOSE SERPL-MCNC: 108 MG/DL (ref 65–99)
HDLC SERPL-MCNC: 42 MG/DL (ref 40–60)
LDLC SERPL CALC-MCNC: 169 MG/DL (ref 0–100)
LDLC/HDLC SERPL: 3.95 {RATIO}
POTASSIUM SERPL-SCNC: 4.4 MMOL/L (ref 3.5–5.2)
PROT SERPL-MCNC: 7.4 G/DL (ref 6–8.5)
SODIUM SERPL-SCNC: 138 MMOL/L (ref 136–145)
TRIGL SERPL-MCNC: 190 MG/DL (ref 0–150)
VLDLC SERPL-MCNC: 35 MG/DL (ref 5–40)

## 2022-01-27 PROCEDURE — 36415 COLL VENOUS BLD VENIPUNCTURE: CPT

## 2022-01-27 PROCEDURE — 80061 LIPID PANEL: CPT

## 2022-01-27 PROCEDURE — 80053 COMPREHEN METABOLIC PANEL: CPT

## 2022-01-27 RX ORDER — ROSUVASTATIN CALCIUM 10 MG/1
10 TABLET, COATED ORAL DAILY
Qty: 30 TABLET | Refills: 11 | Status: SHIPPED | OUTPATIENT
Start: 2022-01-27 | End: 2022-07-15 | Stop reason: ALTCHOICE

## 2022-01-28 NOTE — PROGRESS NOTES
Please call patient, liver and kidney functio normal  Please give her the results of her lipids.  I would like to try Crestor 10 mg daily.  This is a statin but unlike Lipitor, Crestor is water soluble and better tolerated.  I have sent in RX to pharmacy.  She can start this after she gets back from vacation.

## 2022-04-19 RX ORDER — DILTIAZEM HYDROCHLORIDE 120 MG/1
CAPSULE, COATED, EXTENDED RELEASE ORAL
Qty: 90 CAPSULE | Refills: 1 | Status: SHIPPED | OUTPATIENT
Start: 2022-04-19 | End: 2022-07-25 | Stop reason: SDUPTHER

## 2022-05-23 ENCOUNTER — OFFICE VISIT (OUTPATIENT)
Dept: INTERNAL MEDICINE | Facility: CLINIC | Age: 62
End: 2022-05-23

## 2022-05-23 VITALS
RESPIRATION RATE: 18 BRPM | BODY MASS INDEX: 36.57 KG/M2 | TEMPERATURE: 98.3 F | WEIGHT: 233 LBS | SYSTOLIC BLOOD PRESSURE: 104 MMHG | HEIGHT: 67 IN | OXYGEN SATURATION: 99 % | HEART RATE: 89 BPM | DIASTOLIC BLOOD PRESSURE: 84 MMHG

## 2022-05-23 DIAGNOSIS — B97.89 VIRAL RESPIRATORY ILLNESS: ICD-10-CM

## 2022-05-23 DIAGNOSIS — R05.9 COUGH: ICD-10-CM

## 2022-05-23 DIAGNOSIS — J98.8 VIRAL RESPIRATORY ILLNESS: ICD-10-CM

## 2022-05-23 DIAGNOSIS — J02.9 SORE THROAT: Primary | ICD-10-CM

## 2022-05-23 LAB
EXPIRATION DATE: NORMAL
EXPIRATION DATE: NORMAL
FLUAV AG UPPER RESP QL IA.RAPID: NOT DETECTED
FLUBV AG UPPER RESP QL IA.RAPID: NOT DETECTED
INTERNAL CONTROL: NORMAL
INTERNAL CONTROL: NORMAL
Lab: NORMAL
Lab: NORMAL
S PYO AG THROAT QL: NEGATIVE
SARS-COV-2 AG UPPER RESP QL IA.RAPID: NOT DETECTED

## 2022-05-23 PROCEDURE — 99213 OFFICE O/P EST LOW 20 MIN: CPT | Performed by: NURSE PRACTITIONER

## 2022-05-23 PROCEDURE — 87880 STREP A ASSAY W/OPTIC: CPT | Performed by: NURSE PRACTITIONER

## 2022-05-23 PROCEDURE — 87428 SARSCOV & INF VIR A&B AG IA: CPT | Performed by: NURSE PRACTITIONER

## 2022-05-23 RX ORDER — MULTIVIT WITH MINERALS/LUTEIN
250 TABLET ORAL DAILY
COMMUNITY

## 2022-05-23 NOTE — PROGRESS NOTES
"Claudia Gage is a 61 y.o. female presenting today for   Chief Complaint   Patient presents with   • Sore Throat     Started last Monday.    • Cough       Subjective    Sore Throat   This is a new problem. Episode onset: one week ago. Progression since onset: seems a bit better today. Associated symptoms include congestion and coughing. Pertinent negatives include no diarrhea, shortness of breath or vomiting. She has tried acetaminophen for the symptoms.   Cough  Associated symptoms include postnasal drip, rhinorrhea and a sore throat. Pertinent negatives include no chills, fever, myalgias, shortness of breath or wheezing.        The following portions of the patient's history were reviewed and updated as appropriate: allergies, current medications, problem list, past medical history, past surgical history, family history, and social history.    Review of Systems   Constitutional: Negative for chills and fever.   HENT: Positive for congestion, postnasal drip, rhinorrhea and sore throat.    Respiratory: Positive for cough. Negative for shortness of breath and wheezing.    Gastrointestinal: Negative for diarrhea, nausea and vomiting.   Musculoskeletal: Negative for myalgias.   Neurological: Negative for headache.         Objective    Vitals:    05/23/22 1110   BP: 104/84   BP Location: Left arm   Patient Position: Sitting   Cuff Size: Adult   Pulse: 89   Resp: 18   Temp: 98.3 °F (36.8 °C)   TempSrc: Oral   SpO2: 99%   Weight: 106 kg (233 lb)   Height: 170.2 cm (67\")     Body mass index is 36.49 kg/m².  Nursing notes and vitals reviewed.    Physical Exam  Constitutional:       General: She is not in acute distress.     Appearance: She is well-developed.   HENT:      Right Ear: Hearing, tympanic membrane, ear canal and external ear normal.      Left Ear: Hearing, tympanic membrane, ear canal and external ear normal.      Nose: Nose normal.      Mouth/Throat:      Mouth: Mucous membranes are moist.      Pharynx: " Oropharynx is clear. Uvula midline.   Neck:      Thyroid: No thyroid mass or thyromegaly.   Cardiovascular:      Rate and Rhythm: Regular rhythm.      Pulses: Normal pulses.      Heart sounds: S1 normal and S2 normal. No murmur heard.    No friction rub. No gallop.   Pulmonary:      Effort: Pulmonary effort is normal.      Breath sounds: Normal breath sounds. No wheezing, rhonchi or rales.   Musculoskeletal:      Cervical back: Neck supple.   Lymphadenopathy:      Cervical: No cervical adenopathy.   Neurological:      Mental Status: She is alert and oriented to person, place, and time.      Cranial Nerves: No cranial nerve deficit.      Sensory: No sensory deficit.   Psychiatric:         Attention and Perception: She is attentive.         Speech: Speech normal.         Behavior: Behavior normal.       Recent Results (from the past 24 hour(s))   POCT rapid strep A    Collection Time: 05/23/22 11:21 AM    Specimen: Swab   Result Value Ref Range    Rapid Strep A Screen Negative Negative, VALID, INVALID, Not Performed    Internal Control Passed Passed    Lot Number [id number]     Expiration Date 8,172,024    POCT SARS-CoV-2 Antigen QUINN + Flu    Collection Time: 05/23/22 11:36 AM    Specimen: Swab   Result Value Ref Range    SARS Antigen Not Detected Not Detected, Presumptive Negative    Influenza A Antigen QUINN Not Detected Not Detected    Influenza B Antigen QUINN Not Detected Not Detected    Internal Control Passed Passed    Lot Number [id number]     Expiration Date 2,082,023          Assessment and Plan    Diagnoses and all orders for this visit:    1. Sore throat (Primary)  -     POCT rapid strep A  -     POCT SARS-CoV-2 Antigen QUINN + Flu    2. Cough  -     POCT SARS-CoV-2 Antigen QUINN + Flu    3. Viral respiratory illness        Discussed supportive care and emergent S&S.      Medications, including side effects, were discussed with the patient. Patient verbalized understanding.  The plan of care was discussed. All  questions were answered. Patient verbalized understanding.        Return if symptoms worsen or fail to improve.

## 2022-07-15 ENCOUNTER — OFFICE VISIT (OUTPATIENT)
Dept: CARDIOLOGY | Facility: CLINIC | Age: 62
End: 2022-07-15

## 2022-07-15 VITALS
DIASTOLIC BLOOD PRESSURE: 90 MMHG | SYSTOLIC BLOOD PRESSURE: 130 MMHG | WEIGHT: 224 LBS | HEART RATE: 91 BPM | HEIGHT: 67 IN | BODY MASS INDEX: 35.16 KG/M2

## 2022-07-15 DIAGNOSIS — I10 PRIMARY HYPERTENSION: ICD-10-CM

## 2022-07-15 DIAGNOSIS — E11.9 TYPE 2 DIABETES MELLITUS WITHOUT COMPLICATION, WITHOUT LONG-TERM CURRENT USE OF INSULIN: ICD-10-CM

## 2022-07-15 DIAGNOSIS — G47.33 OBSTRUCTIVE SLEEP APNEA SYNDROME: ICD-10-CM

## 2022-07-15 DIAGNOSIS — I48.11 LONGSTANDING PERSISTENT ATRIAL FIBRILLATION: Primary | ICD-10-CM

## 2022-07-15 DIAGNOSIS — E78.2 MIXED HYPERLIPIDEMIA: ICD-10-CM

## 2022-07-15 PROCEDURE — 93000 ELECTROCARDIOGRAM COMPLETE: CPT | Performed by: INTERNAL MEDICINE

## 2022-07-15 PROCEDURE — 99214 OFFICE O/P EST MOD 30 MIN: CPT | Performed by: INTERNAL MEDICINE

## 2022-07-15 RX ORDER — FEXOFENADINE HYDROCHLORIDE 60 MG/1
60 TABLET, FILM COATED ORAL DAILY
COMMUNITY

## 2022-07-15 RX ORDER — PRAVASTATIN SODIUM 20 MG
40 TABLET ORAL NIGHTLY
Qty: 30 TABLET | Refills: 1 | Status: SHIPPED | OUTPATIENT
Start: 2022-07-15 | End: 2022-09-09 | Stop reason: SDUPTHER

## 2022-07-15 NOTE — PROGRESS NOTES
Date of Office Visit: 07/15/2022  Encounter Provider: Mayte Vu MD  Place of Service: [facility]  Patient Name: Claudia Gage  :1960      Patient ID:  Claudia Gage is a 61 y.o. female is here for  followup for atrial fibrillation.        History of Present Illness    She has a history of hypertension, hyperlipidemia, atrial fibrillation, obstructive sleep apnea for which she uses a CPAP. She has recurrent ear infections in the right ear.  She has a left thyroid nodule.      She does not smoke. She is  and works part-time and has four children who are grown. She has alcohol about once or twice per year. 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       Her mother  with coronary artery disease and she had hypertension and diabetes mellitus.      echocardiogram done 3/10/2020 showed ejection fraction 69%, normal diastolic function, normal cardiac chamber sizes, aortic valve calcification but no significant insufficiency or stenosis.      Labs on 2022 show glucose 108, otherwise normal CMP, total cholesterol 246, HDL 42, , VLDL 35, triglycerides 190.  She had a cardioversion done 2022 and went back into normal sinus rhythm but is back in atrial fibrillation today.    She just got back from an [employer].  She and her family went and had a very hard time.  She is back in atrial fibrillation today.  She thought she felt her heart intermittently racing.  She is had no dizziness or syncope.  She is using her CPAP machine.  She has no orthopnea or PND.  She has no exertional dyspnea.  She has no chest pain or pressure.  She went off of rosuvastatin because it caused severe muscle pain.    Past Medical History:   Diagnosis Date   • Atrial fibrillation (HCC)    • Chronic rhinitis 10/15/2019   • Hyperlipidemia    • Hypertension    • Left thyroid nodule 2016   • Sleep apnea    • Type 2 diabetes mellitus without  complication, without long-term current use of insulin (HCC) 2019         Past Surgical History:   Procedure Laterality Date   • APPENDECTOMY  1971   •  SECTION  1998   • COLONOSCOPY  2004    [facility]    • COLONOSCOPY W/ POLYPECTOMY N/A 2021    Procedure: COLONOSCOPY WITH POLYPECTOMY;  Surgeon: Lawrence Toussaint MD;  Location: [facility];  Service: Gastroenterology;  Laterality: N/A;  cecal polyp (cold snare)  descending colon polyp  sigmoid colon polyp  rectal polyps x2  diverticulosis       Current Outpatient Medications on File Prior to Visit   Medication Sig Dispense Refill   • clobetasol (TEMOVATE) 0.05 % cream Apply  topically to the appropriate area as directed 2 (Two) Times a Day. 60 g 1   • dilTIAZem CD (CARDIZEM CD) 120 MG 24 hr capsule TAKE 1 CAPSULE BY MOUTH EVERY DAY 90 capsule 1   • diphenoxylate-atropine (Lomotil) 2.5-0.025 MG per tablet Take 1 tablet by mouth 4 (Four) Times a Day As Needed for Diarrhea. 60 tablet 0   • fexofenadine (ALLEGRA) 60 MG tablet Take 60 mg by mouth Daily.     • fluticasone (FLONASE) 50 MCG/ACT nasal spray 2 sprays into each nostril Daily. 1 each 11   • metoprolol succinate XL (TOPROL-XL) 200 MG 24 hr tablet TAKE 1 TABLET BY MOUTH EVERY DAY AT NIGHT 90 tablet 3   • saccharomyces boulardii (FLORASTOR) 250 MG capsule Take 250 mg by mouth 2 (Two) Times a Day.     • valsartan-hydrochlorothiazide (DIOVAN-HCT) 160-12.5 MG per tablet TAKE 1 TABLET BY MOUTH EVERY DAY 90 tablet 3   • vitamin C (ASCORBIC ACID) 250 MG tablet Take 250 mg by mouth Daily.     • vitamin D (ERGOCALCIFEROL) 1.25 MG (57981 UT) capsule capsule TAKE 1 CAPSULE BY MOUTH ONE TIME PER WEEK 12 capsule 4   • Xarelto 20 MG tablet TAKE 1 TABLET BY MOUTH EVERY DAY 90 tablet 3   • Zinc 50 MG tablet Take  by mouth.     • metFORMIN (GLUCOPHAGE) 500 MG tablet Take 2 tablets by mouth 2 (Two) Times a Day for 270 days. 360 tablet 2   • [DISCONTINUED] Loratadine (CLARITIN  "PO) Take 1 tablet by mouth Daily.     • [DISCONTINUED] rosuvastatin (CRESTOR) 10 MG tablet Take 1 tablet by mouth Daily. 30 tablet 11     No current facility-administered medications on file prior to visit.       Social History     Socioeconomic History   • Marital status:    Tobacco Use   • Smoking status: Never Smoker   • Smokeless tobacco: Never Used   Substance and Sexual Activity   • Alcohol use: Yes     Comment: socially   • Drug use: No   • Sexual activity: Never           ROS    Procedures    ECG 12 Lead    Date/Time: 7/15/2022 1:06 PM  Performed by: Mayte Vu MD  Authorized by: Mayte Vu MD   Comparison: compared with previous ECG   Similar to previous ECG  Rhythm: atrial fibrillation  T flattening: all    Clinical impression: abnormal EKG                Objective:      Vitals:    07/15/22 1255   BP: 130/90   Pulse: 91   Weight: 102 kg (224 lb)   Height: 170.2 cm (67\")     Body mass index is 35.08 kg/m².    Vitals reviewed.   Constitutional:       General: Not in acute distress.     Appearance: Well-developed. Not diaphoretic.   Eyes:      General: No scleral icterus.     Conjunctiva/sclera: Conjunctivae normal.   HENT:      Head: Normocephalic and atraumatic.   Neck:      Thyroid: No thyromegaly.      Vascular: No carotid bruit or JVD.      Lymphadenopathy: No cervical adenopathy.   Pulmonary:      Effort: Pulmonary effort is normal. No respiratory distress.      Breath sounds: Normal breath sounds. No wheezing. No rhonchi. No rales.   Chest:      Chest wall: Not tender to palpatation.   Cardiovascular:      Normal rate. Irregularly irregular rhythm.      Murmurs: There is no murmur.      No gallop.   Pulses:     Intact distal pulses.   Edema:     Peripheral edema absent.   Abdominal:      General: Bowel sounds are normal. There is no distension or abdominal bruit.      Palpations: Abdomen is soft. There is no abdominal mass.      Tenderness: There is no abdominal tenderness. "   Musculoskeletal:         General: No deformity.      Extremities: No clubbing present.     Cervical back: Neck supple. Skin:     General: Skin is warm and dry. There is no cyanosis.      Coloration: Skin is not pale.      Findings: No rash.   Neurological:      Mental Status: Alert and oriented to person, place, and time.      Cranial Nerves: No cranial nerve deficit.   Psychiatric:         Judgment: Judgment normal.         Lab Review:       Assessment:      Diagnosis Plan   1. Longstanding persistent atrial fibrillation (HCC)  Comprehensive Metabolic Panel    Lipid Panel    Magnesium    CBC & Differential    Uric Acid    TSH    Hemoglobin A1c   2. Primary hypertension  Comprehensive Metabolic Panel    Lipid Panel    Magnesium    CBC & Differential    Uric Acid    TSH    Hemoglobin A1c   3. Mixed hyperlipidemia  Comprehensive Metabolic Panel    Lipid Panel    Magnesium    CBC & Differential    Uric Acid    TSH    Hemoglobin A1c   4. Type 2 diabetes mellitus without complication, without long-term current use of insulin (HCC)  Comprehensive Metabolic Panel    Lipid Panel    Magnesium    CBC & Differential    Uric Acid    TSH    Hemoglobin A1c   5. Obstructive sleep apnea syndrome       1. Persistent atrial fibrillation, on Xarelto 20 mg daily, on diltiazem and Toprol-XL.  2. Hypertension. Controlled.   3. Hyperlipidemia.  Could not tolerate atorvastatin or rosuvastatin.  4. Obstructive sleep apnea. On CPAP.   5. Recurrent right ear infections.   6. Obesity and sedentary lifestyle.   7. Family history of coronary artery of coronary artery disease in her mother.   8.  Diabetes mellitus type 2.     Plan:       Try Pravachol 40 mg nightly, see Donell in 6 months and me in a year.  If her heart rate feels fast to her, I have advised her to call and we will increase her diltiazem to 180 mg daily.  No other medication changes and no other testing at this time except for labs.  She has lab orders already and I just advised  her to go get them done.

## 2022-07-20 NOTE — TELEPHONE ENCOUNTER
Rx Refill Note  Requested Prescriptions     Pending Prescriptions Disp Refills    vitamin D (ERGOCALCIFEROL) 1.25 MG (60478 UT) capsule capsule [Pharmacy Med Name: VITAMIN D2 1.25MG(50,000 UNIT)] 12 capsule 4     Sig: TAKE 1 CAPSULE BY MOUTH ONE TIME PER WEEK      Last office visit with prescribing clinician: 5/23/2022      Next office visit with prescribing clinician: Visit date not found            KHAI BOYLE MA  07/20/22, 09:46 EDT

## 2022-07-21 RX ORDER — ERGOCALCIFEROL 1.25 MG/1
CAPSULE ORAL
Qty: 12 CAPSULE | Refills: 4 | Status: SHIPPED | OUTPATIENT
Start: 2022-07-21

## 2022-07-25 ENCOUNTER — TELEPHONE (OUTPATIENT)
Dept: CARDIOLOGY | Facility: CLINIC | Age: 62
End: 2022-07-25

## 2022-07-25 RX ORDER — DILTIAZEM HYDROCHLORIDE 180 MG/1
180 CAPSULE, COATED, EXTENDED RELEASE ORAL DAILY
Qty: 90 CAPSULE | Refills: 3 | Status: SHIPPED | OUTPATIENT
Start: 2022-07-25 | End: 2023-01-10

## 2022-07-25 NOTE — TELEPHONE ENCOUNTER
Patient called and stated that her heart rate is feeling like it did just before they had to shock her heart and wanted to know if you would like to increase her diltiazem like advised at last office visit? She would like it to go to [facility] [place].  Please advise.    CB: [phone]    Last OV-07/15/22    Plan:       Try Pravachol 40 mg nightly, see Donell in 6 months and me in a year.  If her heart rate feels fast to her, I have advised her to call and we will increase her diltiazem to 180 mg daily.  No other medication changes and no other testing at this time except for labs.  She has lab orders already and I just advised her to go get them done.    Fidel

## 2022-07-26 DIAGNOSIS — E11.9 TYPE 2 DIABETES MELLITUS WITHOUT COMPLICATION, WITHOUT LONG-TERM CURRENT USE OF INSULIN: ICD-10-CM

## 2022-07-26 NOTE — TELEPHONE ENCOUNTER
Rx Refill Note  Requested Prescriptions     Pending Prescriptions Disp Refills   • metFORMIN (GLUCOPHAGE) 500 MG tablet 360 tablet 2     Sig: Take 2 tablets by mouth 2 (Two) Times a Day for 270 days.      Last office visit with prescribing clinician: 5/23/2022      Next office visit with prescribing clinician: Visit date not found            KHAI BOYLE MA  07/26/22, 14:46 EDT

## 2022-08-10 ENCOUNTER — TRANSCRIBE ORDERS (OUTPATIENT)
Dept: ADMINISTRATIVE | Facility: HOSPITAL | Age: 62
End: 2022-08-10

## 2022-08-10 DIAGNOSIS — Z12.31 SCREENING MAMMOGRAM FOR BREAST CANCER: Primary | ICD-10-CM

## 2022-08-17 ENCOUNTER — LAB (OUTPATIENT)
Dept: LAB | Facility: HOSPITAL | Age: 62
End: 2022-08-17

## 2022-08-17 ENCOUNTER — HOSPITAL ENCOUNTER (OUTPATIENT)
Dept: MAMMOGRAPHY | Facility: HOSPITAL | Age: 62
Discharge: HOME | End: 2022-08-17

## 2022-08-17 DIAGNOSIS — Z12.31 SCREENING MAMMOGRAM FOR BREAST CANCER: ICD-10-CM

## 2022-08-17 DIAGNOSIS — E78.2 MIXED HYPERLIPIDEMIA: ICD-10-CM

## 2022-08-17 DIAGNOSIS — E11.9 TYPE 2 DIABETES MELLITUS WITHOUT COMPLICATION, WITHOUT LONG-TERM CURRENT USE OF INSULIN: ICD-10-CM

## 2022-08-17 DIAGNOSIS — I10 PRIMARY HYPERTENSION: ICD-10-CM

## 2022-08-17 DIAGNOSIS — I48.11 LONGSTANDING PERSISTENT ATRIAL FIBRILLATION: ICD-10-CM

## 2022-08-17 LAB
ALBUMIN SERPL-MCNC: 4.1 G/DL (ref 3.5–5.2)
ALBUMIN/GLOB SERPL: 1.4 G/DL
ALP SERPL-CCNC: 58 U/L (ref 39–117)
ALT SERPL W P-5'-P-CCNC: 12 U/L (ref 1–33)
ANION GAP SERPL CALCULATED.3IONS-SCNC: 10.9 MMOL/L (ref 5–15)
AST SERPL-CCNC: 18 U/L (ref 1–32)
BASOPHILS # BLD AUTO: 0.06 10*3/MM3 (ref 0–0.2)
BASOPHILS NFR BLD AUTO: 0.8 % (ref 0–1.5)
BILIRUB SERPL-MCNC: 0.6 MG/DL (ref 0–1.2)
BUN SERPL-MCNC: 15 MG/DL (ref 8–23)
BUN/CREAT SERPL: 15.3 (ref 7–25)
CALCIUM SPEC-SCNC: 9.4 MG/DL (ref 8.6–10.5)
CHLORIDE SERPL-SCNC: 100 MMOL/L (ref 98–107)
CHOLEST SERPL-MCNC: 196 MG/DL (ref 0–200)
CO2 SERPL-SCNC: 26.1 MMOL/L (ref 22–29)
CREAT SERPL-MCNC: 0.98 MG/DL (ref 0.57–1)
DEPRECATED RDW RBC AUTO: 40.6 FL (ref 37–54)
EGFRCR SERPLBLD CKD-EPI 2021: 65.4 ML/MIN/1.73
EOSINOPHIL # BLD AUTO: 0.24 10*3/MM3 (ref 0–0.4)
EOSINOPHIL NFR BLD AUTO: 3.2 % (ref 0.3–6.2)
ERYTHROCYTE [DISTWIDTH] IN BLOOD BY AUTOMATED COUNT: 13.2 % (ref 12.3–15.4)
GLOBULIN UR ELPH-MCNC: 2.9 GM/DL
GLUCOSE SERPL-MCNC: 107 MG/DL (ref 65–99)
HBA1C MFR BLD: 6.3 % (ref 4.8–5.6)
HCT VFR BLD AUTO: 39.4 % (ref 34–46.6)
HDLC SERPL-MCNC: 44 MG/DL (ref 40–60)
HGB BLD-MCNC: 13.1 G/DL (ref 12–15.9)
IMM GRANULOCYTES # BLD AUTO: 0.01 10*3/MM3 (ref 0–0.05)
IMM GRANULOCYTES NFR BLD AUTO: 0.1 % (ref 0–0.5)
LDLC SERPL CALC-MCNC: 112 MG/DL (ref 0–100)
LDLC/HDLC SERPL: 2.41 {RATIO}
LYMPHOCYTES # BLD AUTO: 2.18 10*3/MM3 (ref 0.7–3.1)
LYMPHOCYTES NFR BLD AUTO: 28.7 % (ref 19.6–45.3)
MAGNESIUM SERPL-MCNC: 2 MG/DL (ref 1.6–2.4)
MCH RBC QN AUTO: 28.2 PG (ref 26.6–33)
MCHC RBC AUTO-ENTMCNC: 33.2 G/DL (ref 31.5–35.7)
MCV RBC AUTO: 84.7 FL (ref 79–97)
MONOCYTES # BLD AUTO: 0.78 10*3/MM3 (ref 0.1–0.9)
MONOCYTES NFR BLD AUTO: 10.3 % (ref 5–12)
NEUTROPHILS NFR BLD AUTO: 4.33 10*3/MM3 (ref 1.7–7)
NEUTROPHILS NFR BLD AUTO: 56.9 % (ref 42.7–76)
NRBC BLD AUTO-RTO: 0 /100 WBC (ref 0–0.2)
PLATELET # BLD AUTO: 321 10*3/MM3 (ref 140–450)
PMV BLD AUTO: 9.9 FL (ref 6–12)
POTASSIUM SERPL-SCNC: 4.4 MMOL/L (ref 3.5–5.2)
PROT SERPL-MCNC: 7 G/DL (ref 6–8.5)
RBC # BLD AUTO: 4.65 10*6/MM3 (ref 3.77–5.28)
SODIUM SERPL-SCNC: 137 MMOL/L (ref 136–145)
TRIGL SERPL-MCNC: 230 MG/DL (ref 0–150)
TSH SERPL DL<=0.05 MIU/L-ACNC: 1.77 UIU/ML (ref 0.27–4.2)
URATE SERPL-MCNC: 6 MG/DL (ref 2.4–5.7)
VLDLC SERPL-MCNC: 40 MG/DL (ref 5–40)
WBC NRBC COR # BLD: 7.6 10*3/MM3 (ref 3.4–10.8)

## 2022-08-17 PROCEDURE — 84443 ASSAY THYROID STIM HORMONE: CPT

## 2022-08-17 PROCEDURE — 36415 COLL VENOUS BLD VENIPUNCTURE: CPT

## 2022-08-17 PROCEDURE — 80053 COMPREHEN METABOLIC PANEL: CPT

## 2022-08-17 PROCEDURE — 77067 SCR MAMMO BI INCL CAD: CPT

## 2022-08-17 PROCEDURE — 80061 LIPID PANEL: CPT

## 2022-08-17 PROCEDURE — 84550 ASSAY OF BLOOD/URIC ACID: CPT

## 2022-08-17 PROCEDURE — 83735 ASSAY OF MAGNESIUM: CPT

## 2022-08-17 PROCEDURE — 85025 COMPLETE CBC W/AUTO DIFF WBC: CPT

## 2022-08-17 PROCEDURE — 77063 BREAST TOMOSYNTHESIS BI: CPT

## 2022-08-17 PROCEDURE — 83036 HEMOGLOBIN GLYCOSYLATED A1C: CPT

## 2022-08-18 ENCOUNTER — TELEPHONE (OUTPATIENT)
Dept: CARDIOLOGY | Facility: CLINIC | Age: 62
End: 2022-08-18

## 2022-08-18 NOTE — TELEPHONE ENCOUNTER
Patient returned our call.  I let her know MM reviewed her labs and wanted her to know they look good.  She verbalizes understanding.    Rylee Eric RN  [facility]  08/18/22 11:47 EDT

## 2022-08-18 NOTE — TELEPHONE ENCOUNTER
Called and left a vm.  Will continue to try to reach pt    Farrah Henson RN  [facility]

## 2022-09-09 ENCOUNTER — OFFICE VISIT (OUTPATIENT)
Dept: INTERNAL MEDICINE | Facility: CLINIC | Age: 62
End: 2022-09-09

## 2022-09-09 VITALS
OXYGEN SATURATION: 98 % | HEIGHT: 67 IN | WEIGHT: 231 LBS | DIASTOLIC BLOOD PRESSURE: 72 MMHG | TEMPERATURE: 98 F | SYSTOLIC BLOOD PRESSURE: 120 MMHG | HEART RATE: 86 BPM | BODY MASS INDEX: 36.26 KG/M2

## 2022-09-09 DIAGNOSIS — R30.0 DYSURIA: ICD-10-CM

## 2022-09-09 DIAGNOSIS — N30.90 CYSTITIS: Primary | ICD-10-CM

## 2022-09-09 DIAGNOSIS — R35.0 URINARY FREQUENCY: ICD-10-CM

## 2022-09-09 LAB
BILIRUB BLD-MCNC: NEGATIVE MG/DL
CLARITY, POC: ABNORMAL
COLOR UR: ABNORMAL
EXPIRATION DATE: ABNORMAL
GLUCOSE UR STRIP-MCNC: NEGATIVE MG/DL
KETONES UR QL: NEGATIVE
LEUKOCYTE EST, POC: ABNORMAL
Lab: ABNORMAL
NITRITE UR-MCNC: POSITIVE MG/ML
PH UR: 6 [PH] (ref 5–8)
PROT UR STRIP-MCNC: ABNORMAL MG/DL
RBC # UR STRIP: ABNORMAL /UL
SP GR UR: 1.03 (ref 1–1.03)
UROBILINOGEN UR QL: NORMAL

## 2022-09-09 PROCEDURE — 81003 URINALYSIS AUTO W/O SCOPE: CPT | Performed by: INTERNAL MEDICINE

## 2022-09-09 PROCEDURE — 99213 OFFICE O/P EST LOW 20 MIN: CPT | Performed by: INTERNAL MEDICINE

## 2022-09-09 RX ORDER — PRAVASTATIN SODIUM 20 MG
40 TABLET ORAL NIGHTLY
Qty: 180 TABLET | Refills: 3 | Status: SHIPPED | OUTPATIENT
Start: 2022-09-09 | End: 2022-10-06

## 2022-09-09 RX ORDER — NITROFURANTOIN 25; 75 MG/1; MG/1
100 CAPSULE ORAL 2 TIMES DAILY
Qty: 10 CAPSULE | Refills: 0 | Status: SHIPPED | OUTPATIENT
Start: 2022-09-09 | End: 2022-09-14

## 2022-09-09 NOTE — PROGRESS NOTES
Claudia Gage is a 62 y.o. female who presents with a chief complaint of   Chief Complaint   Patient presents with   • Urinary Frequency   • Difficulty Urinating     Painful urination/burning       HPI     Ms. Gage presents for evaluation of burning with urination as well as frequency.     The following portions of the patient's history were reviewed and updated as appropriate: allergies, current medications, past family history, past medical history, past social history, past surgical history and problem list.      Current Outpatient Medications:   •  clobetasol (TEMOVATE) 0.05 % cream, Apply  topically to the appropriate area as directed 2 (Two) Times a Day., Disp: 60 g, Rfl: 1  •  dilTIAZem CD (CARDIZEM CD) 180 MG 24 hr capsule, Take 1 capsule by mouth Daily., Disp: 90 capsule, Rfl: 3  •  diphenoxylate-atropine (Lomotil) 2.5-0.025 MG per tablet, Take 1 tablet by mouth 4 (Four) Times a Day As Needed for Diarrhea., Disp: 60 tablet, Rfl: 0  •  fexofenadine (ALLEGRA) 60 MG tablet, Take 60 mg by mouth Daily., Disp: , Rfl:   •  fluticasone (FLONASE) 50 MCG/ACT nasal spray, 2 sprays into each nostril Daily., Disp: 1 each, Rfl: 11  •  metFORMIN (GLUCOPHAGE) 500 MG tablet, Take 2 tablets by mouth 2 (Two) Times a Day., Disp: 360 tablet, Rfl: 0  •  metoprolol succinate XL (TOPROL-XL) 200 MG 24 hr tablet, TAKE 1 TABLET BY MOUTH EVERY DAY AT NIGHT, Disp: 90 tablet, Rfl: 3  •  pravastatin (Pravachol) 20 MG tablet, Take 2 tablets by mouth Every Night., Disp: 30 tablet, Rfl: 1  •  saccharomyces boulardii (FLORASTOR) 250 MG capsule, Take 250 mg by mouth 2 (Two) Times a Day., Disp: , Rfl:   •  valsartan-hydrochlorothiazide (DIOVAN-HCT) 160-12.5 MG per tablet, TAKE 1 TABLET BY MOUTH EVERY DAY, Disp: 90 tablet, Rfl: 3  •  vitamin C (ASCORBIC ACID) 250 MG tablet, Take 250 mg by mouth Daily., Disp: , Rfl:   •  vitamin D (ERGOCALCIFEROL) 1.25 MG (63987 UT) capsule capsule, TAKE 1 CAPSULE BY MOUTH ONE TIME PER WEEK, Disp: 12  "capsule, Rfl: 4  •  Xarelto 20 MG tablet, TAKE 1 TABLET BY MOUTH EVERY DAY, Disp: 90 tablet, Rfl: 3  •  Zinc 50 MG tablet, Take  by mouth., Disp: , Rfl:   •  nitrofurantoin, macrocrystal-monohydrate, (Macrobid) 100 MG capsule, Take 1 capsule by mouth 2 (Two) Times a Day for 5 days., Disp: 10 capsule, Rfl: 0    Physical Exam  /72 (BP Location: Left arm, Patient Position: Sitting, Cuff Size: Large Adult)   Pulse 86   Temp 98 °F (36.7 °C)   Ht 170.2 cm (67\")   Wt 105 kg (231 lb)   SpO2 98%   BMI 36.18 kg/m²     Physical Exam  Vitals reviewed.   Constitutional:       General: She is not in acute distress.     Appearance: Normal appearance.   HENT:      Head: Normocephalic and atraumatic.      Nose: Nose normal.      Mouth/Throat:      Mouth: Mucous membranes are moist.   Eyes:      Conjunctiva/sclera: Conjunctivae normal.   Cardiovascular:      Rate and Rhythm: Normal rate and regular rhythm.      Pulses: Normal pulses.      Heart sounds: Normal heart sounds.   Pulmonary:      Effort: Pulmonary effort is normal.      Breath sounds: Normal breath sounds.   Abdominal:      Palpations: Abdomen is soft.      Tenderness: There is no abdominal tenderness.   Musculoskeletal:      Right lower leg: No edema.      Left lower leg: No edema.   Skin:     General: Skin is warm and dry.   Neurological:      General: No focal deficit present.      Mental Status: She is alert.   Psychiatric:         Mood and Affect: Mood normal.     ,     Results for orders placed or performed in visit on 09/09/22   POCT urinalysis dipstick, automated    Specimen: Urine   Result Value Ref Range    Color Mireya Yellow, Straw, Dark Yellow, Mireya    Clarity, UA Cloudy (A) Clear    Specific Gravity  1.030 1.005 - 1.030    pH, Urine 6.0 5.0 - 8.0    Leukocytes Trace (A) Negative    Nitrite, UA Positive (A) Negative    Protein, POC 30 mg/dL (A) Negative mg/dL    Glucose, UA Negative Negative mg/dL    Ketones, UA Negative Negative    Urobilinogen, " UA Normal Normal, 0.2 E.U./dL    Bilirubin Negative Negative    Blood, UA Moderate (A) Negative    Lot Number [id number]     Expiration Date 5-            Diagnoses and all orders for this visit:    1. Cystitis (Primary)  Assessment & Plan:  - 2 days of frequency and pain  - Small amount of blood as well  - May have had a kidney stone during pregnancy and also had a kidney infection once  - No fever, vomiting, chills, diarrhea, rash  PLAN  - Treat UTI and f/u urine culture     Orders:  -     nitrofurantoin, macrocrystal-monohydrate, (Macrobid) 100 MG capsule; Take 1 capsule by mouth 2 (Two) Times a Day for 5 days.  Dispense: 10 capsule; Refill: 0  -     Urine Culture - Urine, Urine, Clean Catch    2. Urinary frequency  -     POCT urinalysis dipstick, automated    3. Dysuria  -     POCT urinalysis dipstick, automated

## 2022-09-09 NOTE — PATIENT INSTRUCTIONS
some Debrox over the counter for the wax in your right ear.    You should feel better with the UTI over the weekend.  Please call if not!

## 2022-09-09 NOTE — TELEPHONE ENCOUNTER
Patient called and stated that she is doing well on the Pravastatin and would like it to be sent in to [facility]. Please advise.    CB: [phone]    Thanks,  Fidel

## 2022-09-09 NOTE — ASSESSMENT & PLAN NOTE
- 2 days of frequency and pain  - Small amount of blood as well  - May have had a kidney stone during pregnancy and also had a kidney infection once  - No fever, vomiting, chills, diarrhea, rash  PLAN  - Treat UTI and f/u urine culture

## 2022-09-15 LAB
BACTERIA UR CULT: ABNORMAL
BACTERIA UR CULT: ABNORMAL
OTHER ANTIBIOTIC SUSC ISLT: ABNORMAL

## 2022-09-26 ENCOUNTER — TELEPHONE (OUTPATIENT)
Dept: INTERNAL MEDICINE | Facility: CLINIC | Age: 62
End: 2022-09-26

## 2022-09-26 DIAGNOSIS — E78.2 MIXED HYPERLIPIDEMIA: ICD-10-CM

## 2022-09-26 DIAGNOSIS — E11.9 TYPE 2 DIABETES MELLITUS WITHOUT COMPLICATION, WITHOUT LONG-TERM CURRENT USE OF INSULIN: ICD-10-CM

## 2022-09-26 DIAGNOSIS — I10 PRIMARY HYPERTENSION: ICD-10-CM

## 2022-09-26 DIAGNOSIS — E04.9 GOITER: ICD-10-CM

## 2022-09-26 DIAGNOSIS — E55.9 VITAMIN D DEFICIENCY: ICD-10-CM

## 2022-09-26 DIAGNOSIS — Z00.00 ROUTINE HEALTH MAINTENANCE: Primary | ICD-10-CM

## 2022-10-06 ENCOUNTER — OFFICE VISIT (OUTPATIENT)
Dept: INTERNAL MEDICINE | Facility: CLINIC | Age: 62
End: 2022-10-06

## 2022-10-06 VITALS
BODY MASS INDEX: 36.57 KG/M2 | WEIGHT: 233 LBS | SYSTOLIC BLOOD PRESSURE: 120 MMHG | HEIGHT: 67 IN | TEMPERATURE: 98 F | DIASTOLIC BLOOD PRESSURE: 78 MMHG | HEART RATE: 67 BPM | OXYGEN SATURATION: 98 %

## 2022-10-06 DIAGNOSIS — Z78.0 POSTMENOPAUSE: ICD-10-CM

## 2022-10-06 DIAGNOSIS — I10 PRIMARY HYPERTENSION: ICD-10-CM

## 2022-10-06 DIAGNOSIS — E11.9 ENCOUNTER FOR DIABETIC FOOT EXAM: ICD-10-CM

## 2022-10-06 DIAGNOSIS — Z00.00 ENCOUNTER FOR WELLNESS EXAMINATION IN ADULT: Primary | ICD-10-CM

## 2022-10-06 DIAGNOSIS — E78.2 MIXED HYPERLIPIDEMIA: ICD-10-CM

## 2022-10-06 DIAGNOSIS — I48.11 LONGSTANDING PERSISTENT ATRIAL FIBRILLATION: ICD-10-CM

## 2022-10-06 DIAGNOSIS — E11.9 TYPE 2 DIABETES MELLITUS WITHOUT COMPLICATION, WITHOUT LONG-TERM CURRENT USE OF INSULIN: ICD-10-CM

## 2022-10-06 PROCEDURE — 99396 PREV VISIT EST AGE 40-64: CPT | Performed by: NURSE PRACTITIONER

## 2022-10-06 RX ORDER — PRAVASTATIN SODIUM 40 MG
40 TABLET ORAL NIGHTLY
Qty: 90 TABLET | Refills: 1 | Status: SHIPPED | OUTPATIENT
Start: 2022-10-06 | End: 2022-12-22 | Stop reason: ALTCHOICE

## 2022-10-06 NOTE — PROGRESS NOTES
NEGRA Taylor is a 62 y.o. female presenting for Annual Exam (Physical)    Her current/chronic health conditions include:  Patient Active Problem List   Diagnosis   • HTN (hypertension)   • HLD (hyperlipidemia)   • Left thyroid nodule   • Longstanding persistent atrial fibrillation (HCC)   • Goiter   • Vitamin D deficiency   • Obstructive sleep apnea syndrome   • Chronic rhinitis   • Type 2 diabetes mellitus without complication, without long-term current use of insulin (HCC)   • Diarrhea of presumed infectious origin   • Encounter for screening for malignant neoplasm of colon   • Urinary frequency   • Cystitis       Outpatient Medications Marked as Taking for the 10/6/22 encounter (Office Visit) with Darlin Kirkland APRN   Medication Sig Dispense Refill   • clobetasol (TEMOVATE) 0.05 % cream Apply  topically to the appropriate area as directed 2 (Two) Times a Day. 60 g 1   • dilTIAZem CD (CARDIZEM CD) 180 MG 24 hr capsule Take 1 capsule by mouth Daily. 90 capsule 3   • diphenoxylate-atropine (Lomotil) 2.5-0.025 MG per tablet Take 1 tablet by mouth 4 (Four) Times a Day As Needed for Diarrhea. 60 tablet 0   • fexofenadine (ALLEGRA) 60 MG tablet Take 60 mg by mouth Daily.     • fluticasone (FLONASE) 50 MCG/ACT nasal spray 2 sprays into each nostril Daily. 1 each 11   • metFORMIN (GLUCOPHAGE) 500 MG tablet Take 2 tablets by mouth 2 (Two) Times a Day. 360 tablet 0   • metoprolol succinate XL (TOPROL-XL) 200 MG 24 hr tablet TAKE 1 TABLET BY MOUTH EVERY DAY AT NIGHT 90 tablet 3   • pravastatin (Pravachol) 40 MG tablet Take 1 tablet by mouth Every Night. 90 tablet 1   • saccharomyces boulardii (FLORASTOR) 250 MG capsule Take 250 mg by mouth 2 (Two) Times a Day.     • valsartan-hydrochlorothiazide (DIOVAN-HCT) 160-12.5 MG per tablet TAKE 1 TABLET BY MOUTH EVERY DAY 90 tablet 3   • vitamin C (ASCORBIC ACID) 250 MG tablet Take 250 mg by mouth Daily.     • vitamin D (ERGOCALCIFEROL) 1.25 MG (27017 UT) capsule  "capsule TAKE 1 CAPSULE BY MOUTH ONE TIME PER WEEK 12 capsule 4   • Xarelto 20 MG tablet TAKE 1 TABLET BY MOUTH EVERY DAY 90 tablet 3   • Zinc 50 MG tablet Take  by mouth.     • [DISCONTINUED] pravastatin (Pravachol) 20 MG tablet Take 2 tablets by mouth Every Night. 180 tablet 3       Afib - this is f/b Dr. Harris. She is UTD to f/u. She is consistent w/ Xarelto.     HTN - she is currently taking Diovan and Toprol. She does not self monitor. Denies CP, palpitations, SOB, HA, or dizziness.     HLD - she continues to take Lipitor and tolerates this well.     DM - She does not self monitor. She continues to take Metformin. She has not had eye exam.       Screenings:  PAP: negative 05/2021  Mammogram: negative 08/2022  Dexa: has never had  Colon Cancer: 11/2021 w/ recall in 5 yrs  Tob use: never      The following portions of the patient's history were reviewed and updated as appropriate: allergies, current medications, problem list, past medical history, past family history, and past social history.    Review of Systems   Constitutional: Negative.    HENT: Negative.  Negative for nosebleeds.    Eyes: Negative.    Respiratory: Negative.  Negative for shortness of breath.    Cardiovascular: Negative.  Negative for chest pain and palpitations.   Gastrointestinal: Negative.    Endocrine: Negative.    Genitourinary: Negative.    Musculoskeletal: Negative.    Skin: Negative.    Allergic/Immunologic: Negative.    Neurological: Negative.  Negative for dizziness and headaches.   Hematological: Negative.  Does not bruise/bleed easily.   Psychiatric/Behavioral: Negative.        OBJECTIVE    Vitals:    10/06/22 1452   BP: 120/78   BP Location: Left arm   Patient Position: Sitting   Cuff Size: Large Adult   Pulse: 67   Temp: 98 °F (36.7 °C)   SpO2: 98%   Weight: 106 kg (233 lb)   Height: 170.2 cm (67\")       BP Readings from Last 3 Encounters:   10/06/22 120/78   09/09/22 120/72   07/15/22 130/90        Wt Readings from Last 3 " Encounters:   10/06/22 106 kg (233 lb)   09/09/22 105 kg (231 lb)   07/15/22 102 kg (224 lb)        Body mass index is 36.49 kg/m².  Nursing notes and vital signs reviewed.      Physical Exam  Constitutional:       General: She is not in acute distress.     Appearance: Normal appearance. She is well-developed.   HENT:      Head: Normocephalic.      Right Ear: Hearing, tympanic membrane, ear canal and external ear normal.      Left Ear: Hearing, tympanic membrane, ear canal and external ear normal.      Nose: Nose normal. No mucosal edema or rhinorrhea.      Mouth/Throat:      Mouth: Mucous membranes are moist.      Pharynx: Oropharynx is clear. Uvula midline.   Eyes:      General: Lids are normal.      Extraocular Movements: Extraocular movements intact.      Conjunctiva/sclera: Conjunctivae normal.      Pupils: Pupils are equal, round, and reactive to light.   Neck:      Thyroid: No thyroid mass or thyromegaly.   Cardiovascular:      Rate and Rhythm: Regular rhythm.      Pulses: Normal pulses.           Dorsalis pedis pulses are 2+ on the right side and 2+ on the left side.        Posterior tibial pulses are 2+ on the right side and 2+ on the left side.      Heart sounds: S1 normal and S2 normal. No murmur heard.    No friction rub. No gallop.   Pulmonary:      Effort: Pulmonary effort is normal.      Breath sounds: Normal breath sounds. No wheezing, rhonchi or rales.   Abdominal:      General: Bowel sounds are normal.      Palpations: Abdomen is soft.      Tenderness: There is no abdominal tenderness. There is no guarding.      Hernia: No hernia is present.   Musculoskeletal:         General: No deformity. Normal range of motion.      Cervical back: Normal range of motion and neck supple.      Right foot: Normal range of motion. No deformity.      Left foot: Normal range of motion. No deformity.   Feet:      Right foot:      Protective Sensation: 5 sites tested. 5 sites sensed.      Skin integrity: Skin integrity  normal.      Toenail Condition: Right toenails are normal.      Left foot:      Protective Sensation: 5 sites tested. 5 sites sensed.      Skin integrity: Skin integrity normal.      Toenail Condition: Left toenails are normal.   Lymphadenopathy:      Cervical: No cervical adenopathy.   Skin:     General: Skin is warm and dry.      Findings: No lesion or rash.   Neurological:      General: No focal deficit present.      Mental Status: She is alert and oriented to person, place, and time.      Cranial Nerves: No cranial nerve deficit.      Sensory: No sensory deficit.      Motor: Motor function is intact.      Coordination: Coordination is intact.      Gait: Gait normal.      Deep Tendon Reflexes: Reflexes are normal and symmetric.   Psychiatric:         Attention and Perception: She is attentive.         Mood and Affect: Mood and affect normal.         Speech: Speech normal.         Behavior: Behavior normal.         Thought Content: Thought content normal.         Recent Results (from the past 672 hour(s))   POCT urinalysis dipstick, automated    Collection Time: 09/09/22 11:21 AM    Specimen: Urine   Result Value Ref Range    Color Mireya Yellow, Straw, Dark Yellow, Mireya    Clarity, UA Cloudy (A) Clear    Specific Gravity  1.030 1.005 - 1.030    pH, Urine 6.0 5.0 - 8.0    Leukocytes Trace (A) Negative    Nitrite, UA Positive (A) Negative    Protein, POC 30 mg/dL (A) Negative mg/dL    Glucose, UA Negative Negative mg/dL    Ketones, UA Negative Negative    Urobilinogen, UA Normal Normal, 0.2 E.U./dL    Bilirubin Negative Negative    Blood, UA Moderate (A) Negative    Lot Number [id number]     Expiration Date 5-    Urine Culture - Urine, Urine, Clean Catch    Collection Time: 09/09/22 11:36 AM    Specimen: Urine, Clean Catch    UR   Result Value Ref Range    Urine Culture Final report (A)     Result 1 Citrobacter koseri (A)     Susceptibility Testing Comment    CBC (No Diff)    Collection Time: 09/30/22  9:18 AM     Specimen: Blood   Result Value Ref Range    WBC 5.76 3.40 - 10.80 10*3/mm3    RBC 4.78 3.77 - 5.28 10*6/mm3    Hemoglobin 13.5 12.0 - 15.9 g/dL    Hematocrit 41.6 34.0 - 46.6 %    MCV 87.0 79.0 - 97.0 fL    MCH 28.2 26.6 - 33.0 pg    MCHC 32.5 31.5 - 35.7 g/dL    RDW 12.8 12.3 - 15.4 %    Platelets 327 140 - 450 10*3/mm3   Comprehensive Metabolic Panel    Collection Time: 09/30/22  9:18 AM    Specimen: Blood   Result Value Ref Range    Glucose 111 (H) 65 - 99 mg/dL    BUN 16 8 - 23 mg/dL    Creatinine 0.93 0.57 - 1.00 mg/dL    EGFR Result 69.6 >60.0 mL/min/1.73    BUN/Creatinine Ratio 17.2 7.0 - 25.0    Sodium 138 136 - 145 mmol/L    Potassium 4.4 3.5 - 5.2 mmol/L    Chloride 98 98 - 107 mmol/L    Total CO2 30.0 (H) 22.0 - 29.0 mmol/L    Calcium 9.5 8.6 - 10.5 mg/dL    Total Protein 6.9 6.0 - 8.5 g/dL    Albumin 4.30 3.50 - 5.20 g/dL    Globulin 2.6 gm/dL    A/G Ratio 1.7 g/dL    Total Bilirubin 0.7 0.0 - 1.2 mg/dL    Alkaline Phosphatase 66 39 - 117 U/L    AST (SGOT) 20 1 - 32 U/L    ALT (SGPT) 17 1 - 33 U/L   Hemoglobin A1c    Collection Time: 09/30/22  9:18 AM    Specimen: Blood   Result Value Ref Range    Hemoglobin A1C 6.20 (H) 4.80 - 5.60 %   Lipid Panel With / Chol / HDL Ratio    Collection Time: 09/30/22  9:18 AM    Specimen: Blood   Result Value Ref Range    Total Cholesterol 208 (H) 0 - 200 mg/dL    Triglycerides 265 (H) 0 - 150 mg/dL    HDL Cholesterol 48 40 - 60 mg/dL    VLDL Cholesterol Jean 46 (H) 5 - 40 mg/dL    LDL Chol Calc (NIH) 114 (H) 0 - 100 mg/dL    Chol/HDL Ratio 4.33    TSH    Collection Time: 09/30/22  9:18 AM    Specimen: Blood   Result Value Ref Range    TSH 1.640 0.270 - 4.200 uIU/mL   Vitamin D 25 Hydroxy    Collection Time: 09/30/22  9:18 AM    Specimen: Blood   Result Value Ref Range    25 Hydroxy, Vitamin D 47.5 30.0 - 100.0 ng/ml   Microalbumin / Creatinine Urine Ratio - Urine, Clean Catch    Collection Time: 09/30/22  9:18 AM    Specimen: Urine, Clean Catch   Result Value Ref  Range    Creatinine, Urine 166.4 Not Estab. mg/dL    Microalbumin, Urine 19.2 Not Estab. ug/mL    Microalbumin/Creatinine Ratio 12 0 - 29 mg/g creat         ASSESSMENT AND PLAN    Diagnoses and all orders for this visit:    1. Encounter for wellness examination in adult (Primary)    2. Postmenopause  -     DEXA Bone Density Axial; Future    3. Primary hypertension   - controlled    4. Mixed hyperlipidemia  -     pravastatin (Pravachol) 40 MG tablet; Take 1 tablet by mouth Every Night.  Dispense: 90 tablet; Refill: 1  - Uncontrolled  - Increase Prava to 40mg    5. Type 2 diabetes mellitus without complication, without long-term current use of insulin (HCC)   - controlled    6. Longstanding persistent atrial fibrillation (HCC)   - controlled    7. Encounter for diabetic foot exam (HCC)        Preventative counseling completed including relevant screenings, appropriate vaccinations, healthy nutrition, and appropriate physical activity. Age-appropriate Screening & Interventions recommended by USPSTF were reviewed with the patient, and Health Maintenance was updated in Epic.  Class 2 Severe Obesity (BMI >=35 and <=39.9). Obesity-related health conditions include the following: obstructive sleep apnea, hypertension, diabetes mellitus and dyslipidemias. Obesity is unchanged. BMI is is above average; BMI management plan is completed. We discussed portion control and increasing exercise.      Except as noted above, pt will continue current medications as noted in the medication list. I will continue to authorize refills as needed.      Medications, including side effects, were discussed with the patient. Patient verbalized understanding.  The plan of care was discussed. All questions were answered. Patient verbalized understanding.        Return in about 6 months (around 4/6/2023) for fasting labs one week prior to., or sooner if needed.

## 2022-10-14 ENCOUNTER — APPOINTMENT (OUTPATIENT)
Dept: BONE DENSITY | Facility: HOSPITAL | Age: 62
End: 2022-10-14

## 2022-10-19 DIAGNOSIS — E11.9 TYPE 2 DIABETES MELLITUS WITHOUT COMPLICATION, WITHOUT LONG-TERM CURRENT USE OF INSULIN: ICD-10-CM

## 2022-10-19 NOTE — TELEPHONE ENCOUNTER
Rx Refill Note  Requested Prescriptions     Pending Prescriptions Disp Refills   • metFORMIN (GLUCOPHAGE) 500 MG tablet 360 tablet 0     Sig: Take 2 tablets by mouth 2 (Two) Times a Day.      Last office visit with prescribing clinician: 10/6/2022      Next office visit with prescribing clinician: 4/20/2023            Claudine Wynne MA  10/19/22, 15:32 EDT

## 2022-12-22 ENCOUNTER — OFFICE VISIT (OUTPATIENT)
Dept: CARDIOLOGY | Facility: CLINIC | Age: 62
End: 2022-12-22

## 2022-12-22 VITALS
WEIGHT: 228.9 LBS | HEART RATE: 83 BPM | HEIGHT: 67 IN | BODY MASS INDEX: 35.93 KG/M2 | DIASTOLIC BLOOD PRESSURE: 78 MMHG | OXYGEN SATURATION: 98 % | SYSTOLIC BLOOD PRESSURE: 140 MMHG

## 2022-12-22 DIAGNOSIS — I48.11 LONGSTANDING PERSISTENT ATRIAL FIBRILLATION: Primary | ICD-10-CM

## 2022-12-22 DIAGNOSIS — I10 PRIMARY HYPERTENSION: ICD-10-CM

## 2022-12-22 DIAGNOSIS — G47.33 OBSTRUCTIVE SLEEP APNEA SYNDROME: ICD-10-CM

## 2022-12-22 DIAGNOSIS — E78.2 MIXED HYPERLIPIDEMIA: ICD-10-CM

## 2022-12-22 PROCEDURE — 93000 ELECTROCARDIOGRAM COMPLETE: CPT | Performed by: NURSE PRACTITIONER

## 2022-12-22 PROCEDURE — 99214 OFFICE O/P EST MOD 30 MIN: CPT | Performed by: NURSE PRACTITIONER

## 2022-12-22 NOTE — PROGRESS NOTES
Date of Office Visit: 2022  Encounter Provider: QUIN Cordero  Place of Service: [facility] CARDIOLOGY  Patient Name: Claudia Gage  :1960  Primary Cardiologist: Dr. Vu    Chief Complaint   Patient presents with   • Follow-up   : 6 month follow up    Dear Darlin    HPI: Claudia Gage is a pleasant 62 y.o. female who presents 2022 for cardiac follow up. I have reviewed her past medical records including notes, labs and testing in preparation for today's visit.    She has a history of hypertension, hyperlipidemia, atrial fibrillation, obstructive sleep apnea for which she uses a CPAP. She has recurrent ear infections in the right ear.  She has a left thyroid nodule.      She does not smoke. She is  and works part-time and has four children who are grown. She has alcohol about once or twice per year. 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.       Her mother  with coronary artery disease and she had hypertension and diabetes mellitus.      echocardiogram done 3/10/2020 showed ejection fraction 69%, normal diastolic function, normal cardiac chamber sizes, aortic valve calcification but no significant insufficiency or stenosis.       Labs on 2022 show glucose 108, otherwise normal CMP, total cholesterol 246, HDL 42, , VLDL 35, triglycerides 190.  She had a cardioversion done 2022 and went back into normal sinus rhythm but is back in atrial fibrillation today.     She saw Dr. Vu in 2022.  She had just gotten back from an [place] cruise.  She and her family went and had a very hard time.  She is back in atrial fibrillation today.  She thought she felt her heart intermittently racing.  She is had no dizziness or syncope.  She is using her CPAP machine.  She has no orthopnea or PND.  She has no exertional dyspnea.  She has no chest pain or pressure.  She went off of rosuvastatin because it caused severe  muscle pain.  She started her on pravastatin.    She returns today for 6-month follow-up.  She was unable to tolerate the pravastatin as well.  She denies any shortness of breath, lower extremity edema, dizziness or lightheadedness.  She has not had any syncope or presyncopal episodes.  She denies any chest pain, chest pressure or fatigue.  She will occasionally feel palpitations.  She is taking her medications as directed.  She had labs performed 2022 that showed a CMP with a glucose of 111.  Normal electrolytes and liver function testing.  Her hemoglobin A1c was 6.2.  TSH 1.640.  Lipid panel showed total cholesterol 208, HDL 48,  and triglycerides 265.  25 hydroxy vitamin D was 47.5.  CBC unremarkable. She cannot always tell when she is in atrial fibrillation and thinks she may be going in and out of it.  She states she does feel tired a lot and wonders if it is because of the irregular heart rate. She states she would be interested in trying another cardioversion.     Past Medical History:   Diagnosis Date   • Atrial fibrillation (HCC)    • Chronic rhinitis 10/15/2019   • Hyperlipidemia    • Hypertension    • Left thyroid nodule 2016   • Sleep apnea    • Type 2 diabetes mellitus without complication, without long-term current use of insulin (HCC) 2019       Past Surgical History:   Procedure Laterality Date   • APPENDECTOMY     •  SECTION  1998   • COLONOSCOPY  2004    [facility]    • COLONOSCOPY W/ POLYPECTOMY N/A 2021    Procedure: COLONOSCOPY WITH POLYPECTOMY;  Surgeon: Lawrence Toussaint MD;  Location: [facility];  Service: Gastroenterology;  Laterality: N/A;  cecal polyp (cold snare)  descending colon polyp  sigmoid colon polyp  rectal polyps x2  diverticulosis       Social History     Socioeconomic History   • Marital status:    Tobacco Use   • Smoking status: Never   • Smokeless tobacco: Never   Substance and Sexual Activity    • Alcohol use: Yes     Comment: socially   • Drug use: No   • Sexual activity: Never       Family History   Problem Relation Age of Onset   • Heart disease Mother    • Hypertension Mother    • Diabetes Mother    • Hypertension Father    • Melanoma Father    • Colon polyps Father    • Colon cancer Father    • Breast cancer Paternal Aunt        The following portion of the patient's history were reviewed and updated as appropriate: past medical history, past surgical history, past social history, past family history, allergies, current medications, and problem list.    Review of Systems   Constitutional: Negative for diaphoresis, fever and malaise/fatigue.   HENT: Negative for congestion, hearing loss, hoarse voice, nosebleeds and sore throat.    Eyes: Negative for photophobia, vision loss in left eye, vision loss in right eye and visual disturbance.   Cardiovascular: Positive for irregular heartbeat and palpitations. Negative for chest pain, dyspnea on exertion, leg swelling, near-syncope, orthopnea, paroxysmal nocturnal dyspnea and syncope.   Respiratory: Negative for cough, hemoptysis, shortness of breath, sleep disturbances due to breathing, snoring, sputum production and wheezing.    Endocrine: Negative for cold intolerance, heat intolerance, polydipsia, polyphagia and polyuria.   Hematologic/Lymphatic: Negative for bleeding problem. Does not bruise/bleed easily.   Skin: Negative for color change, dry skin, poor wound healing, rash and suspicious lesions.   Musculoskeletal: Negative for arthritis, back pain, falls, gout, joint pain, joint swelling, muscle cramps, muscle weakness and myalgias.   Gastrointestinal: Negative for bloating, abdominal pain, constipation, diarrhea, dysphagia, melena, nausea and vomiting.   Neurological: Negative for excessive daytime sleepiness, dizziness, headaches, light-headedness, loss of balance, numbness, paresthesias, seizures, vertigo and weakness.   Psychiatric/Behavioral:  "Negative for depression, memory loss and substance abuse. The patient is not nervous/anxious.        Allergies   Allergen Reactions   • Atorvastatin Myalgia   • Lisinopril Cough   • Rosuvastatin Myalgia   • Sulfa Antibiotics Nausea And Vomiting   • Latex Rash         Current Outpatient Medications:   •  clobetasol (TEMOVATE) 0.05 % cream, Apply  topically to the appropriate area as directed 2 (Two) Times a Day., Disp: 60 g, Rfl: 1  •  dilTIAZem CD (CARDIZEM CD) 180 MG 24 hr capsule, Take 1 capsule by mouth Daily., Disp: 90 capsule, Rfl: 3  •  diphenoxylate-atropine (Lomotil) 2.5-0.025 MG per tablet, Take 1 tablet by mouth 4 (Four) Times a Day As Needed for Diarrhea., Disp: 60 tablet, Rfl: 0  •  fexofenadine (ALLEGRA) 60 MG tablet, Take 60 mg by mouth Daily., Disp: , Rfl:   •  fluticasone (FLONASE) 50 MCG/ACT nasal spray, 2 sprays into each nostril Daily., Disp: 1 each, Rfl: 11  •  metFORMIN (GLUCOPHAGE) 500 MG tablet, Take 2 tablets by mouth 2 (Two) Times a Day., Disp: 360 tablet, Rfl: 0  •  metoprolol succinate XL (TOPROL-XL) 200 MG 24 hr tablet, TAKE 1 TABLET BY MOUTH EVERY DAY AT NIGHT, Disp: 90 tablet, Rfl: 3  •  saccharomyces boulardii (FLORASTOR) 250 MG capsule, Take 250 mg by mouth 2 (Two) Times a Day., Disp: , Rfl:   •  valsartan-hydrochlorothiazide (DIOVAN-HCT) 160-12.5 MG per tablet, TAKE 1 TABLET BY MOUTH EVERY DAY, Disp: 90 tablet, Rfl: 3  •  vitamin C (ASCORBIC ACID) 250 MG tablet, Take 250 mg by mouth Daily., Disp: , Rfl:   •  vitamin D (ERGOCALCIFEROL) 1.25 MG (95901 UT) capsule capsule, TAKE 1 CAPSULE BY MOUTH ONE TIME PER WEEK, Disp: 12 capsule, Rfl: 4  •  Xarelto 20 MG tablet, TAKE 1 TABLET BY MOUTH EVERY DAY, Disp: 90 tablet, Rfl: 3  •  Zinc 50 MG tablet, Take  by mouth., Disp: , Rfl:         Objective:     Vitals:    12/22/22 1004   BP: 140/78   BP Location: Right arm   Patient Position: Sitting   Pulse: 83   SpO2: 98%   Weight: 104 kg (228 lb 14.4 oz)   Height: 170.2 cm (67\")     Body mass index " is 35.85 kg/m².      Vitals reviewed.   Constitutional:       General: Not in acute distress.     Appearance: Well-developed and not in distress.   Eyes:      General:         Right eye: No discharge.         Left eye: No discharge.      Conjunctiva/sclera: Conjunctivae normal.   HENT:      Head: Normocephalic and atraumatic.      Right Ear: External ear normal.      Left Ear: External ear normal.      Nose: Nose normal.   Neck:      Thyroid: No thyromegaly.      Vascular: No JVD.      Trachea: No tracheal deviation.      Lymphadenopathy: No cervical adenopathy.   Pulmonary:      Effort: Pulmonary effort is normal. No respiratory distress.      Breath sounds: Normal breath sounds. No wheezing. No rales.   Chest:      Chest wall: Not tender to palpatation.   Cardiovascular:      Normal rate. Irregularly irregular rhythm.      No gallop.   Pulses:     Intact distal pulses.   Edema:     Peripheral edema absent.   Abdominal:      General: There is no distension.      Palpations: Abdomen is soft.      Tenderness: There is no abdominal tenderness.   Musculoskeletal: Normal range of motion.         General: No tenderness or deformity.      Cervical back: Normal range of motion and neck supple. Skin:     General: Skin is warm and dry.      Findings: No erythema or rash.   Neurological:      Mental Status: Alert and oriented to person, place, and time.      Coordination: Coordination normal.   Psychiatric:         Attention and Perception: Attention normal.         Behavior: Behavior normal.         Thought Content: Thought content normal.         Cognition and Memory: Cognition normal.         Judgment: Judgment normal.               ECG 12 Lead    Date/Time: 12/22/2022 10:20 AM  Performed by: Mayte Miranda APRN  Authorized by: Mayte Miranda APRN   Comparison: compared with previous ECG from 7/15/2022  Similar to previous ECG  Rhythm: atrial fibrillation  Rate: normal  Conduction: conduction normal  ST Segments: ST  segments normal  T Waves: T waves normal  QRS axis: normal    Clinical impression: abnormal EKG              Assessment:       Diagnosis Plan   1. Longstanding persistent atrial fibrillation (HCC)        2. Primary hypertension        3. Mixed hyperlipidemia        4. Obstructive sleep apnea syndrome               Plan:       1. Persistent atrial fibrillation, on Xarelto 20 mg daily, on diltiazem and Toprol-XL. She is in rate controlled atrial fibrillation today.  She cannot always feel her irregular heart rate but states she is tired a lot.  She has not missed any doses of her medications.  She is interested in trying another cardioversion.  Note sent to RM.  2. Hypertension. - controlled  3. Hyperlipidemia.  Could not tolerate atorvastatin or rosuvastatin or pravastatin secondary to myalgias.  We did discuss other treatments and she is willing to try Livalo 2 mg daily.  I have given her samples.  She will call with an update in a few weeks.  4. Obstructive sleep apnea. On CPAP. States compliance  5. Recurrent right ear infections.   6. Obesity and sedentary lifestyle.   7. Family history of coronary artery of coronary artery disease in her mother.   8.  Diabetes mellitus type 2. - recent labs reviewed as above.    Try livalo 2 mg daily  RTO in 6 months with RM    As always, it has been a pleasure to participate in your patient's care. Thank you.       Sincerely,       QUIN Cordero      Current Outpatient Medications:   •  clobetasol (TEMOVATE) 0.05 % cream, Apply  topically to the appropriate area as directed 2 (Two) Times a Day., Disp: 60 g, Rfl: 1  •  dilTIAZem CD (CARDIZEM CD) 180 MG 24 hr capsule, Take 1 capsule by mouth Daily., Disp: 90 capsule, Rfl: 3  •  diphenoxylate-atropine (Lomotil) 2.5-0.025 MG per tablet, Take 1 tablet by mouth 4 (Four) Times a Day As Needed for Diarrhea., Disp: 60 tablet, Rfl: 0  •  fexofenadine (ALLEGRA) 60 MG tablet, Take 60 mg by mouth Daily., Disp: , Rfl:   •  fluticasone  (FLONASE) 50 MCG/ACT nasal spray, 2 sprays into each nostril Daily., Disp: 1 each, Rfl: 11  •  metFORMIN (GLUCOPHAGE) 500 MG tablet, Take 2 tablets by mouth 2 (Two) Times a Day., Disp: 360 tablet, Rfl: 0  •  metoprolol succinate XL (TOPROL-XL) 200 MG 24 hr tablet, TAKE 1 TABLET BY MOUTH EVERY DAY AT NIGHT, Disp: 90 tablet, Rfl: 3  •  saccharomyces boulardii (FLORASTOR) 250 MG capsule, Take 250 mg by mouth 2 (Two) Times a Day., Disp: , Rfl:   •  valsartan-hydrochlorothiazide (DIOVAN-HCT) 160-12.5 MG per tablet, TAKE 1 TABLET BY MOUTH EVERY DAY, Disp: 90 tablet, Rfl: 3  •  vitamin C (ASCORBIC ACID) 250 MG tablet, Take 250 mg by mouth Daily., Disp: , Rfl:   •  vitamin D (ERGOCALCIFEROL) 1.25 MG (82860 UT) capsule capsule, TAKE 1 CAPSULE BY MOUTH ONE TIME PER WEEK, Disp: 12 capsule, Rfl: 4  •  Xarelto 20 MG tablet, TAKE 1 TABLET BY MOUTH EVERY DAY, Disp: 90 tablet, Rfl: 3  •  Zinc 50 MG tablet, Take  by mouth., Disp: , Rfl:   •  pitavastatin calcium (Livalo) 2 MG tablet tablet, Take 1 tablet by mouth Every Night., Disp: 30 tablet, Rfl: 3      Dictated utilizing Dragon dictation

## 2023-01-06 ENCOUNTER — OFFICE VISIT (OUTPATIENT)
Dept: INTERNAL MEDICINE | Facility: CLINIC | Age: 63
End: 2023-01-06
Payer: COMMERCIAL

## 2023-01-06 VITALS
DIASTOLIC BLOOD PRESSURE: 70 MMHG | HEIGHT: 67 IN | WEIGHT: 226 LBS | HEART RATE: 106 BPM | OXYGEN SATURATION: 98 % | BODY MASS INDEX: 35.47 KG/M2 | SYSTOLIC BLOOD PRESSURE: 128 MMHG | TEMPERATURE: 97.3 F

## 2023-01-06 DIAGNOSIS — H66.91 RIGHT ACUTE OTITIS MEDIA: Primary | ICD-10-CM

## 2023-01-06 DIAGNOSIS — M51.36 DDD (DEGENERATIVE DISC DISEASE), LUMBAR: ICD-10-CM

## 2023-01-06 PROBLEM — M51.369 DDD (DEGENERATIVE DISC DISEASE), LUMBAR: Status: ACTIVE | Noted: 2023-01-06

## 2023-01-06 PROCEDURE — 99214 OFFICE O/P EST MOD 30 MIN: CPT | Performed by: NURSE PRACTITIONER

## 2023-01-06 RX ORDER — AZITHROMYCIN 250 MG/1
TABLET, FILM COATED ORAL
Qty: 6 TABLET | Refills: 0 | Status: SHIPPED | OUTPATIENT
Start: 2023-01-06 | End: 2023-01-11

## 2023-01-06 NOTE — PROGRESS NOTES
Chief Complaint   Patient presents with   • Back Pain     Lower back pain.. x 4 days   • Earache     Both ears. Right hurts worse than left. X 3 days   • Atrial Fibrillation     Pt is currently in a fibb       Subjective     Claudia Gage is a 62 y.o. female being seen for a follow up appointment today regarding ear pain and sinus congestion. This started 2 weeks ago, precipitated by dental work. She had some dental work 12-, with a root canal and root removal. She was treated with oral Amoxil antibiotic and oral rinse. She is still having sinus congestion, bilateral ear pain. She denies fever. She is fully vaccinated against Covid and flu. She is taking Iburpofen 104 tablets in 4 days.       She is reporting increasing back pain. She is reporting BLE back pain, radiating into right leg. Described as throbbing back pain. She has known DDD. XR and PT in May 2021.   History of Present Illness     Allergies   Allergen Reactions   • Atorvastatin Myalgia   • Lisinopril Cough   • Rosuvastatin Myalgia   • Sulfa Antibiotics Nausea And Vomiting   • Latex Rash         Current Outpatient Medications:   •  clobetasol (TEMOVATE) 0.05 % cream, Apply  topically to the appropriate area as directed 2 (Two) Times a Day., Disp: 60 g, Rfl: 1  •  dilTIAZem CD (CARDIZEM CD) 180 MG 24 hr capsule, Take 1 capsule by mouth Daily., Disp: 90 capsule, Rfl: 3  •  fexofenadine (ALLEGRA) 60 MG tablet, Take 60 mg by mouth Daily., Disp: , Rfl:   •  fluticasone (FLONASE) 50 MCG/ACT nasal spray, 2 sprays into each nostril Daily., Disp: 1 each, Rfl: 11  •  metFORMIN (GLUCOPHAGE) 500 MG tablet, Take 2 tablets by mouth 2 (Two) Times a Day., Disp: 360 tablet, Rfl: 0  •  metoprolol succinate XL (TOPROL-XL) 200 MG 24 hr tablet, TAKE 1 TABLET BY MOUTH EVERY DAY AT NIGHT, Disp: 90 tablet, Rfl: 3  •  pitavastatin calcium (Livalo) 2 MG tablet tablet, Take 1 tablet by mouth Every Night., Disp: 30 tablet, Rfl: 3  •  valsartan-hydrochlorothiazide  (DIOVAN-HCT) 160-12.5 MG per tablet, TAKE 1 TABLET BY MOUTH EVERY DAY, Disp: 90 tablet, Rfl: 3  •  vitamin C (ASCORBIC ACID) 250 MG tablet, Take 250 mg by mouth Daily., Disp: , Rfl:   •  vitamin D (ERGOCALCIFEROL) 1.25 MG (48608 UT) capsule capsule, TAKE 1 CAPSULE BY MOUTH ONE TIME PER WEEK, Disp: 12 capsule, Rfl: 4  •  Xarelto 20 MG tablet, TAKE 1 TABLET BY MOUTH EVERY DAY, Disp: 90 tablet, Rfl: 3  •  Zinc 50 MG tablet, Take  by mouth., Disp: , Rfl:     The following portions of the patient's history were reviewed and updated as appropriate: allergies, current medications, past family history, past medical history, past social history, past surgical history and problem list.    Review of Systems   Constitutional: Negative.    HENT: Positive for congestion, ear discharge and ear pain.    Respiratory: Negative.  Negative for shortness of breath, wheezing and stridor.    Gastrointestinal: Positive for nausea.   Musculoskeletal: Positive for back pain.   All other systems reviewed and are negative.      Assessment     Physical Exam  Vitals reviewed.   Constitutional:       Appearance: Normal appearance. She is diaphoretic.   HENT:      Head: Normocephalic.      Right Ear: Tympanic membrane is injected and bulging.      Left Ear: Tympanic membrane is not injected or retracted.      Nose: Congestion and rhinorrhea present.   Cardiovascular:      Rate and Rhythm: Normal rate and regular rhythm.      Pulses: Normal pulses.      Heart sounds: Normal heart sounds. No murmur heard.  Pulmonary:      Effort: Pulmonary effort is normal. No respiratory distress.      Breath sounds: Normal breath sounds. No stridor.   Neurological:      Mental Status: She is alert.         Plan     Her her XR lumbar from 5-2021    Diagnoses and all orders for this visit:    1. Right acute otitis media (Primary)  -     azithromycin (Zithromax) 250 MG tablet; Take 2 tablets the first day, then 1 tablet daily for 4 days.  Dispense: 6 tablet; Refill:  0    2. DDD (degenerative disc disease), lumbar  Comments:  Proceed with MRI due to DDD. Will defer to PCP for Followu p in 2 weeks  Orders:  -     MRI Lumbar Spine Without Contrast; Future    Check CMP due to overuse Advil OTC    Follow up in 2 weeks with Darlin Kirkland

## 2023-01-07 LAB
ALBUMIN SERPL-MCNC: 4.3 G/DL (ref 3.5–5.2)
ALBUMIN/GLOB SERPL: 1.4 G/DL
ALP SERPL-CCNC: 68 U/L (ref 39–117)
ALT SERPL-CCNC: 15 U/L (ref 1–33)
AST SERPL-CCNC: 16 U/L (ref 1–32)
BILIRUB SERPL-MCNC: 0.5 MG/DL (ref 0–1.2)
BUN SERPL-MCNC: 25 MG/DL (ref 8–23)
BUN/CREAT SERPL: 23.4 (ref 7–25)
CALCIUM SERPL-MCNC: 9.8 MG/DL (ref 8.6–10.5)
CHLORIDE SERPL-SCNC: 105 MMOL/L (ref 98–107)
CO2 SERPL-SCNC: 24.8 MMOL/L (ref 22–29)
CREAT SERPL-MCNC: 1.07 MG/DL (ref 0.57–1)
EGFRCR SERPLBLD CKD-EPI 2021: 58.9 ML/MIN/1.73
GLOBULIN SER CALC-MCNC: 3.1 GM/DL
GLUCOSE SERPL-MCNC: 114 MG/DL (ref 65–99)
POTASSIUM SERPL-SCNC: 4.7 MMOL/L (ref 3.5–5.2)
PROT SERPL-MCNC: 7.4 G/DL (ref 6–8.5)
SODIUM SERPL-SCNC: 140 MMOL/L (ref 136–145)

## 2023-01-09 RX ORDER — RIVAROXABAN 20 MG/1
TABLET, FILM COATED ORAL
Qty: 90 TABLET | Refills: 2 | Status: SHIPPED | OUTPATIENT
Start: 2023-01-09

## 2023-01-09 RX ORDER — VALSARTAN AND HYDROCHLOROTHIAZIDE 160; 12.5 MG/1; MG/1
TABLET, FILM COATED ORAL
Qty: 90 TABLET | Refills: 2 | Status: SHIPPED | OUTPATIENT
Start: 2023-01-09

## 2023-01-09 RX ORDER — METOPROLOL SUCCINATE 200 MG/1
200 TABLET, EXTENDED RELEASE ORAL
Qty: 90 TABLET | Refills: 1 | Status: SHIPPED | OUTPATIENT
Start: 2023-01-09

## 2023-01-10 ENCOUNTER — CLINICAL SUPPORT (OUTPATIENT)
Dept: CARDIOLOGY | Facility: CLINIC | Age: 63
End: 2023-01-10
Payer: COMMERCIAL

## 2023-01-10 DIAGNOSIS — I48.11 LONGSTANDING PERSISTENT ATRIAL FIBRILLATION: Primary | ICD-10-CM

## 2023-01-10 PROCEDURE — 93000 ELECTROCARDIOGRAM COMPLETE: CPT | Performed by: NURSE PRACTITIONER

## 2023-01-10 RX ORDER — DILTIAZEM HYDROCHLORIDE 240 MG/1
240 CAPSULE, COATED, EXTENDED RELEASE ORAL DAILY
Qty: 30 CAPSULE | Refills: 11 | Status: SHIPPED | OUTPATIENT
Start: 2023-01-10 | End: 2023-01-26

## 2023-01-10 NOTE — TELEPHONE ENCOUNTER
Rx Refill Note  Requested Prescriptions     Pending Prescriptions Disp Refills   • pitavastatin calcium (Livalo) 2 MG tablet tablet 30 tablet 3     Sig: Take 1 tablet by mouth Every Night.      Last office visit with prescribing clinician: 12/22/2022   Last telemedicine visit with prescribing clinician: 6/16/2023   Next office visit with prescribing clinician: Visit date not found                         Would you like a call back once the refill request has been completed: [] Yes [] No    If the office needs to give you a call back, can they leave a voicemail: [] Yes [] No    Lisa Claros MA  01/10/23, 14:22 EST

## 2023-01-10 NOTE — PROGRESS NOTES
Procedure     ECG 12 Lead    Date/Time: 1/10/2023 2:44 PM  Performed by: Mayte Miranda APRN  Authorized by: Matye Miranda APRN   Comparison: compared with previous ECG from 12/22/2022  Similar to previous ECG  Rhythm: atrial fibrillation  Rate: normal  Conduction: conduction normal  ST Segments: ST segments normal  T Waves: T waves normal  QRS axis: normal    Clinical impression: abnormal EKG        Patient had stop by the office requesting samples.  While here she mentioned that she was still in atrial fibrillation and still had not heard back about a possible cardioversion.  I had her do an EKG which did show that she was indeed still in atrial fibrillation mildly tachycardic at 116.  She has not missed any doses of her Xarelto.  I am going to increase her diltiazem up to 240 mg daily and send a note to Dr. Vu as patient did question whether or not she would be a candidate for another cardioversion.

## 2023-01-11 ENCOUNTER — TELEPHONE (OUTPATIENT)
Dept: CARDIOLOGY | Facility: CLINIC | Age: 63
End: 2023-01-11
Payer: COMMERCIAL

## 2023-01-11 DIAGNOSIS — I48.11 LONGSTANDING PERSISTENT ATRIAL FIBRILLATION: Primary | ICD-10-CM

## 2023-01-11 RX ORDER — AMIODARONE HYDROCHLORIDE 200 MG/1
200 TABLET ORAL DAILY
Qty: 30 TABLET | Refills: 5 | Status: SHIPPED | OUTPATIENT
Start: 2023-01-11 | End: 2023-01-25 | Stop reason: SDUPTHER

## 2023-01-11 NOTE — TELEPHONE ENCOUNTER
----- Message from QUIN Cash sent at 1/10/2023  4:26 PM EST -----  Please call and let her know that yes she still is fast she is in the 1 teens.  She may be going faster or slower at times.  I am going to increase her diltiazem up to 240 mg daily.  I have sent in a new prescription.  Remain on all of her other medications.  I have sent another note to Dr. Vu about possible cardioversion.

## 2023-01-11 NOTE — TELEPHONE ENCOUNTER
Please call and let her know that I think we should try cardioversion again.  I went the cardioversion to be done with me at [facility] with anesthesia early in the morning on a Wednesday or Thursday when I am in the office.  Could also be done a week when I am in the hospital.  I did send in amiodarone 200 mg once daily to be started in order to facilitate getting her back and rhythm.  I want her to go ahead and start this.  She had talked about cardioversion with Donell in the office so  she is aware of what this entails.

## 2023-01-11 NOTE — TELEPHONE ENCOUNTER
Notified patient of results/recommendations. Patient verbalized understanding.    Tanika Nevarez RN  [place] [facility] [place]

## 2023-01-12 ENCOUNTER — TELEPHONE (OUTPATIENT)
Dept: CARDIOLOGY | Facility: CLINIC | Age: 63
End: 2023-01-12
Payer: COMMERCIAL

## 2023-01-12 RX ORDER — EZETIMIBE 10 MG/1
10 TABLET ORAL DAILY
Qty: 90 TABLET | Refills: 3 | Status: SHIPPED | OUTPATIENT
Start: 2023-01-12

## 2023-01-12 NOTE — TELEPHONE ENCOUNTER
----- Message from Mayte Vu MD sent at 1/12/2023 11:52 AM EST -----  I sent in zetia 10mg daily.   ----- Message -----  From: Lisa Claros MA  Sent: 1/12/2023   9:52 AM EST  To: Mayte Vu MD, Fidel Eric MA    Attempted PA for livalo, it is not apart of her approved medications list for her insurance, they want her to try zetia. Reccommendations? Donell is out until 1/18

## 2023-01-17 ENCOUNTER — HOSPITAL ENCOUNTER (OUTPATIENT)
Dept: MRI IMAGING | Facility: HOSPITAL | Age: 63
Discharge: HOME | End: 2023-01-17
Admitting: NURSE PRACTITIONER
Payer: COMMERCIAL

## 2023-01-17 DIAGNOSIS — M51.36 DDD (DEGENERATIVE DISC DISEASE), LUMBAR: ICD-10-CM

## 2023-01-17 PROCEDURE — 72148 MRI LUMBAR SPINE W/O DYE: CPT

## 2023-01-20 ENCOUNTER — OFFICE VISIT (OUTPATIENT)
Dept: INTERNAL MEDICINE | Facility: CLINIC | Age: 63
End: 2023-01-20
Payer: COMMERCIAL

## 2023-01-20 VITALS
WEIGHT: 228 LBS | HEART RATE: 93 BPM | HEIGHT: 67 IN | DIASTOLIC BLOOD PRESSURE: 68 MMHG | TEMPERATURE: 96.9 F | OXYGEN SATURATION: 99 % | SYSTOLIC BLOOD PRESSURE: 132 MMHG | BODY MASS INDEX: 35.79 KG/M2

## 2023-01-20 DIAGNOSIS — M47.816 FACET ARTHROPATHY, LUMBAR: ICD-10-CM

## 2023-01-20 DIAGNOSIS — H92.01 OTALGIA OF RIGHT EAR: Primary | ICD-10-CM

## 2023-01-20 DIAGNOSIS — M48.061 LUMBAR FORAMINAL STENOSIS: ICD-10-CM

## 2023-01-20 DIAGNOSIS — M51.36 DEGENERATIVE DISC DISEASE, LUMBAR: ICD-10-CM

## 2023-01-20 DIAGNOSIS — M48.062 SPINAL STENOSIS OF LUMBAR REGION WITH NEUROGENIC CLAUDICATION: ICD-10-CM

## 2023-01-20 PROCEDURE — 99214 OFFICE O/P EST MOD 30 MIN: CPT | Performed by: NURSE PRACTITIONER

## 2023-01-20 NOTE — PROGRESS NOTES
Subjective   Claudia Gage is a 62 y.o. female presenting today for follow up of   Chief Complaint   Patient presents with   • Earache     2 week f/u, saw jessica for ear infection, finished with antibiotic, pain is improved but still feels odd.    • Back Pain       History of Present Illness     Outpatient Medications Marked as Taking for the 1/20/23 encounter (Office Visit) with Darlin Kirkland APRN   Medication Sig Dispense Refill   • amiodarone (PACERONE) 200 MG tablet Take 1 tablet by mouth Daily. 30 tablet 5   • clobetasol (TEMOVATE) 0.05 % cream Apply  topically to the appropriate area as directed 2 (Two) Times a Day. 60 g 1   • dilTIAZem CD (CARDIZEM CD) 240 MG 24 hr capsule Take 1 capsule by mouth Daily. 30 capsule 11   • ezetimibe (ZETIA) 10 MG tablet Take 1 tablet by mouth Daily. 90 tablet 3   • fexofenadine (ALLEGRA) 60 MG tablet Take 60 mg by mouth Daily.     • fluticasone (FLONASE) 50 MCG/ACT nasal spray 2 sprays into each nostril Daily. 1 each 11   • metFORMIN (GLUCOPHAGE) 500 MG tablet Take 2 tablets by mouth 2 (Two) Times a Day. 360 tablet 0   • metoprolol succinate XL (TOPROL-XL) 200 MG 24 hr tablet Take 1 tablet by mouth every night at bedtime. 90 tablet 1   • Probiotic Product (PROBIOTIC-10 PO) Take  by mouth.     • valsartan-hydrochlorothiazide (DIOVAN-HCT) 160-12.5 MG per tablet TAKE 1 TABLET BY MOUTH EVERY DAY 90 tablet 2   • vitamin C (ASCORBIC ACID) 250 MG tablet Take 250 mg by mouth Daily.     • vitamin D (ERGOCALCIFEROL) 1.25 MG (76627 UT) capsule capsule TAKE 1 CAPSULE BY MOUTH ONE TIME PER WEEK 12 capsule 4   • Xarelto 20 MG tablet TAKE 1 TABLET BY MOUTH EVERY DAY 90 tablet 2   • Zinc 50 MG tablet Take  by mouth.         Pt presents for 2 wk f/u r/t ear pain and back pain. She saw EDNA Dejesus on 01/06. Note reviewed.  Office Visit with Jessica Dejesus APRN (01/06/2023)  Her ear pain is improving.  She has been able to control back pain w/ intermittent Tylenol. She has had PT in the past  "w/ good results.      The following portions of the patient's history were reviewed and updated as appropriate: allergies, current medications, past family history, past medical history, past social history, past surgical history and problem list.        Objective   Vitals:    01/20/23 0945   BP: 132/68   BP Location: Left arm   Pulse: 93   Temp: 96.9 °F (36.1 °C)   TempSrc: Infrared   SpO2: 99%   Weight: 103 kg (228 lb)   Height: 170.2 cm (67.01\")       BP Readings from Last 3 Encounters:   01/20/23 132/68   01/06/23 128/70   12/22/22 140/78        Wt Readings from Last 3 Encounters:   01/20/23 103 kg (228 lb)   01/17/23 103 kg (226 lb)   01/06/23 103 kg (226 lb)        Body mass index is 35.7 kg/m².  Nursing notes and vitals reviewed.    Physical Exam  Constitutional:       General: She is not in acute distress.     Appearance: She is well-developed.   HENT:      Head: Normocephalic.      Right Ear: Hearing, tympanic membrane, ear canal and external ear normal.   Pulmonary:      Effort: Pulmonary effort is normal.   Neurological:      Mental Status: She is alert.      Sensory: Sensation is intact.      Motor: Motor function is intact.      Coordination: Coordination is intact.      Gait: Gait is intact.   Psychiatric:         Attention and Perception: She is attentive.         Speech: Speech normal.         Recent Results (from the past 672 hour(s))   Comprehensive metabolic panel    Collection Time: 01/06/23  2:11 PM    Specimen: Blood   Result Value Ref Range    Glucose 114 (H) 65 - 99 mg/dL    BUN 25 (H) 8 - 23 mg/dL    Creatinine 1.07 (H) 0.57 - 1.00 mg/dL    EGFR Result 58.9 (L) >60.0 mL/min/1.73    BUN/Creatinine Ratio 23.4 7.0 - 25.0    Sodium 140 136 - 145 mmol/L    Potassium 4.7 3.5 - 5.2 mmol/L    Chloride 105 98 - 107 mmol/L    Total CO2 24.8 22.0 - 29.0 mmol/L    Calcium 9.8 8.6 - 10.5 mg/dL    Total Protein 7.4 6.0 - 8.5 g/dL    Albumin 4.3 3.5 - 5.2 g/dL    Globulin 3.1 gm/dL    A/G Ratio 1.4 g/dL    " Total Bilirubin 0.5 0.0 - 1.2 mg/dL    Alkaline Phosphatase 68 39 - 117 U/L    AST (SGOT) 16 1 - 32 U/L    ALT (SGPT) 15 1 - 33 U/L     MRI Lumbar Spine Without Contrast    Result Date: 1/18/2023  1.  Degenerative changes of the lumbar spine superimposed on congenitally narrowed spinal canal due to short pedicles. Combination of features causes sometimes severe thecal sac narrowing, namely at L3-L4 and L4-5 with lesser degrees of degenerative change at other levels. There are sometimes severe foraminal narrowings. Details are as above. Signer Name: SHAI RAMÍREZ MD  Signed: 1/18/2023 9:33 AM  Workstation Name: [facility]  [facility]      Assessment & Plan   Diagnoses and all orders for this visit:    1. Otalgia of right ear (Primary)    2. Degenerative disc disease, lumbar  -     Ambulatory Referral to Physical Therapy Evaluate and treat    3. Lumbar foraminal stenosis  -     Ambulatory Referral to Physical Therapy Evaluate and treat    4. Facet arthropathy, lumbar  -     Ambulatory Referral to Physical Therapy Evaluate and treat    5. Spinal stenosis of lumbar region with neurogenic claudication  -     Ambulatory Referral to Physical Therapy Evaluate and treat        Counseled re Ibuprofen use.  Discussed Neurosurgical consult but pt declined at this time.   Discussed Pain Management consult but pt declined at this time.      Medications, including side effects, were discussed with the patient. Patient verbalized understanding.  The plan of care was discussed. All questions were answered. Patient verbalized understanding.

## 2023-01-25 ENCOUNTER — HOSPITAL ENCOUNTER (OUTPATIENT)
Dept: POSTOP/PACU | Facility: HOSPITAL | Age: 63
Discharge: HOME | End: 2023-01-25
Payer: COMMERCIAL

## 2023-01-25 ENCOUNTER — ANESTHESIA (OUTPATIENT)
Dept: POSTOP/PACU | Facility: HOSPITAL | Age: 63
End: 2023-01-25
Payer: COMMERCIAL

## 2023-01-25 ENCOUNTER — ANESTHESIA EVENT (OUTPATIENT)
Dept: POSTOP/PACU | Facility: HOSPITAL | Age: 63
End: 2023-01-25
Payer: COMMERCIAL

## 2023-01-25 VITALS
RESPIRATION RATE: 16 BRPM | HEART RATE: 65 BPM | DIASTOLIC BLOOD PRESSURE: 85 MMHG | SYSTOLIC BLOOD PRESSURE: 131 MMHG | TEMPERATURE: 98.3 F | OXYGEN SATURATION: 100 %

## 2023-01-25 VITALS — SYSTOLIC BLOOD PRESSURE: 122 MMHG | OXYGEN SATURATION: 94 % | DIASTOLIC BLOOD PRESSURE: 73 MMHG | HEART RATE: 71 BPM

## 2023-01-25 DIAGNOSIS — I48.11 LONGSTANDING PERSISTENT ATRIAL FIBRILLATION: ICD-10-CM

## 2023-01-25 LAB
ANION GAP SERPL CALCULATED.3IONS-SCNC: 9.2 MMOL/L (ref 5–15)
BUN SERPL-MCNC: 19 MG/DL (ref 8–23)
BUN/CREAT SERPL: 19.2 (ref 7–25)
CALCIUM SPEC-SCNC: 9.6 MG/DL (ref 8.6–10.5)
CHLORIDE SERPL-SCNC: 106 MMOL/L (ref 98–107)
CO2 SERPL-SCNC: 24.8 MMOL/L (ref 22–29)
CREAT SERPL-MCNC: 0.99 MG/DL (ref 0.57–1)
EGFRCR SERPLBLD CKD-EPI 2021: 64.6 ML/MIN/1.73
GLUCOSE SERPL-MCNC: 115 MG/DL (ref 65–99)
POTASSIUM SERPL-SCNC: 4.4 MMOL/L (ref 3.5–5.2)
QT INTERVAL: 419 MS
QT INTERVAL: 444 MS
SODIUM SERPL-SCNC: 140 MMOL/L (ref 136–145)

## 2023-01-25 PROCEDURE — 93010 ELECTROCARDIOGRAM REPORT: CPT | Performed by: INTERNAL MEDICINE

## 2023-01-25 PROCEDURE — 92960 CARDIOVERSION ELECTRIC EXT: CPT

## 2023-01-25 PROCEDURE — 80048 BASIC METABOLIC PNL TOTAL CA: CPT | Performed by: INTERNAL MEDICINE

## 2023-01-25 PROCEDURE — 92960 CARDIOVERSION ELECTRIC EXT: CPT | Performed by: INTERNAL MEDICINE

## 2023-01-25 PROCEDURE — 25010000002 PROPOFOL 10 MG/ML EMULSION: Performed by: NURSE ANESTHETIST, CERTIFIED REGISTERED

## 2023-01-25 PROCEDURE — 93005 ELECTROCARDIOGRAM TRACING: CPT | Performed by: INTERNAL MEDICINE

## 2023-01-25 PROCEDURE — S0260 H&P FOR SURGERY: HCPCS | Performed by: INTERNAL MEDICINE

## 2023-01-25 RX ORDER — FLUMAZENIL 0.1 MG/ML
0.2 INJECTION INTRAVENOUS AS NEEDED
Status: DISCONTINUED | OUTPATIENT
Start: 2023-01-25 | End: 2023-01-26 | Stop reason: HOSPADM

## 2023-01-25 RX ORDER — HYDRALAZINE HYDROCHLORIDE 20 MG/ML
5 INJECTION INTRAMUSCULAR; INTRAVENOUS
Status: DISCONTINUED | OUTPATIENT
Start: 2023-01-25 | End: 2023-01-26 | Stop reason: HOSPADM

## 2023-01-25 RX ORDER — HYDROMORPHONE HYDROCHLORIDE 1 MG/ML
0.5 INJECTION, SOLUTION INTRAMUSCULAR; INTRAVENOUS; SUBCUTANEOUS
Status: DISCONTINUED | OUTPATIENT
Start: 2023-01-25 | End: 2023-01-26 | Stop reason: HOSPADM

## 2023-01-25 RX ORDER — SODIUM CHLORIDE, SODIUM LACTATE, POTASSIUM CHLORIDE, CALCIUM CHLORIDE 600; 310; 30; 20 MG/100ML; MG/100ML; MG/100ML; MG/100ML
INJECTION, SOLUTION INTRAVENOUS CONTINUOUS PRN
Status: DISCONTINUED | OUTPATIENT
Start: 2023-01-25 | End: 2023-01-25 | Stop reason: SURG

## 2023-01-25 RX ORDER — DIPHENHYDRAMINE HCL 25 MG
25 CAPSULE ORAL
Status: DISCONTINUED | OUTPATIENT
Start: 2023-01-25 | End: 2023-01-26 | Stop reason: HOSPADM

## 2023-01-25 RX ORDER — ONDANSETRON 2 MG/ML
4 INJECTION INTRAMUSCULAR; INTRAVENOUS ONCE AS NEEDED
Status: DISCONTINUED | OUTPATIENT
Start: 2023-01-25 | End: 2023-01-26 | Stop reason: HOSPADM

## 2023-01-25 RX ORDER — HYDROCODONE BITARTRATE AND ACETAMINOPHEN 7.5; 325 MG/1; MG/1
1 TABLET ORAL ONCE AS NEEDED
Status: DISCONTINUED | OUTPATIENT
Start: 2023-01-25 | End: 2023-01-26 | Stop reason: HOSPADM

## 2023-01-25 RX ORDER — PROMETHAZINE HYDROCHLORIDE 25 MG/1
25 TABLET ORAL ONCE AS NEEDED
Status: DISCONTINUED | OUTPATIENT
Start: 2023-01-25 | End: 2023-01-26 | Stop reason: HOSPADM

## 2023-01-25 RX ORDER — AMIODARONE HYDROCHLORIDE 200 MG/1
100 TABLET ORAL DAILY
Qty: 30 TABLET | Refills: 5 | Status: SHIPPED | OUTPATIENT
Start: 2023-01-25

## 2023-01-25 RX ORDER — OXYCODONE AND ACETAMINOPHEN 7.5; 325 MG/1; MG/1
1 TABLET ORAL EVERY 4 HOURS PRN
Status: DISCONTINUED | OUTPATIENT
Start: 2023-01-25 | End: 2023-01-26 | Stop reason: HOSPADM

## 2023-01-25 RX ORDER — LABETALOL HYDROCHLORIDE 5 MG/ML
5 INJECTION, SOLUTION INTRAVENOUS
Status: DISCONTINUED | OUTPATIENT
Start: 2023-01-25 | End: 2023-01-26 | Stop reason: HOSPADM

## 2023-01-25 RX ORDER — PROMETHAZINE HYDROCHLORIDE 25 MG/1
25 SUPPOSITORY RECTAL ONCE AS NEEDED
Status: DISCONTINUED | OUTPATIENT
Start: 2023-01-25 | End: 2023-01-26 | Stop reason: HOSPADM

## 2023-01-25 RX ORDER — DIPHENHYDRAMINE HYDROCHLORIDE 50 MG/ML
12.5 INJECTION INTRAMUSCULAR; INTRAVENOUS
Status: DISCONTINUED | OUTPATIENT
Start: 2023-01-25 | End: 2023-01-26 | Stop reason: HOSPADM

## 2023-01-25 RX ORDER — EPHEDRINE SULFATE 50 MG/ML
5 INJECTION, SOLUTION INTRAVENOUS ONCE AS NEEDED
Status: DISCONTINUED | OUTPATIENT
Start: 2023-01-25 | End: 2023-01-26 | Stop reason: HOSPADM

## 2023-01-25 RX ORDER — FENTANYL CITRATE 50 UG/ML
50 INJECTION, SOLUTION INTRAMUSCULAR; INTRAVENOUS
Status: DISCONTINUED | OUTPATIENT
Start: 2023-01-25 | End: 2023-01-26 | Stop reason: HOSPADM

## 2023-01-25 RX ORDER — NALOXONE HCL 0.4 MG/ML
0.2 VIAL (ML) INJECTION AS NEEDED
Status: DISCONTINUED | OUTPATIENT
Start: 2023-01-25 | End: 2023-01-26 | Stop reason: HOSPADM

## 2023-01-25 RX ORDER — PROPOFOL 10 MG/ML
VIAL (ML) INTRAVENOUS AS NEEDED
Status: DISCONTINUED | OUTPATIENT
Start: 2023-01-25 | End: 2023-01-25 | Stop reason: SURG

## 2023-01-25 RX ADMIN — SODIUM CHLORIDE, POTASSIUM CHLORIDE, SODIUM LACTATE AND CALCIUM CHLORIDE: 600; 310; 30; 20 INJECTION, SOLUTION INTRAVENOUS at 06:39

## 2023-01-25 RX ADMIN — PROPOFOL 150 MG: 10 INJECTION, EMULSION INTRAVENOUS at 07:19

## 2023-01-25 NOTE — ANESTHESIA POSTPROCEDURE EVALUATION
Patient: Claudia Gage    Procedure Summary     Date: 01/25/23 Room / Location: [facility] PACU    Anesthesia Start: 0710 Anesthesia Stop: 0722    Procedure: CARDIOVERSION EXTERNAL IN CARDIOLOGY DEPARTMENT Diagnosis:       Longstanding persistent atrial fibrillation (HCC)      (a fib)    Scheduled Providers: Mayte Vu MD Provider: Jeremi Bach MD    Anesthesia Type: MAC ASA Status: 3          Anesthesia Type: MAC    Vitals  Vitals Value Taken Time   /56 01/25/23 0723   Temp     Pulse 70 01/25/23 0723   Resp 16 01/25/23 0723   SpO2 98 % 01/25/23 0723           Post Anesthesia Care and Evaluation    Patient location during evaluation: PACU  Patient participation: complete - patient participated  Level of consciousness: awake and alert  Pain management: adequate    Airway patency: patent  Anesthetic complications: No anesthetic complications  PONV Status: controlled  Cardiovascular status: acceptable and hemodynamically stable  Respiratory status: acceptable  Hydration status: acceptable    Comments: /56 (BP Location: Left arm, Patient Position: Lying)   Pulse 70   Temp 36.8 °C (98.3 °F) (Oral)   Resp 16   SpO2 98%

## 2023-01-25 NOTE — H&P
Date of Hospital Visit: [unfilled]ENC@  Encounter Provider: Mayte Vu MD  Place of Service: [facility] CARDIOLOGY  Patient Name: Claudia Gage  :1960  Referral Provider: Mayte Vu MD    Chief complaint    Atrial fibrillation    History of Present Illness    She has a history of hypertension, hyperlipidemia, atrial fibrillation, obstructive sleep apnea for which she uses a CPAP. She has recurrent ear infections in the right ear.  She has a left thyroid nodule.      She does not smoke. She is  and works part-time and has four children who are grown. She has alcohol about once or twice per year. 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       Her mother  with coronary artery disease and she had hypertension and diabetes mellitus.      echocardiogram done 3/10/2020 showed ejection fraction 69%, normal diastolic function, normal cardiac chamber sizes, aortic valve calcification but no significant insufficiency or stenosis.       Labs on 2022 show glucose 108, otherwise normal CMP, total cholesterol 246, HDL 42, , VLDL 35, triglycerides 190.  She had a cardioversion done 2022 and went back into normal sinus rhythm but is back in atrial fibrillation today.    She has fatigue and chest pain.  She has no difficulty breathing.  She does feel her heart racing at times but she is had no dizziness or syncope.  She has no fevers, chills or cough.    Past Medical History:   Diagnosis Date   • Atrial fibrillation (HCC)    • Chronic rhinitis 10/15/2019   • Hyperlipidemia    • Hypertension    • Left thyroid nodule 2016   • Sleep apnea    • Type 2 diabetes mellitus without complication, without long-term current use of insulin (HCC) 2019       Past Surgical History:   Procedure Laterality Date   • APPENDECTOMY     •  SECTION  1998   • COLONOSCOPY  2004    [facility]    • COLONOSCOPY W/  POLYPECTOMY N/A 11/13/2021    Procedure: COLONOSCOPY WITH POLYPECTOMY;  Surgeon: Lawrence Toussaint MD;  Location: [facility];  Service: Gastroenterology;  Laterality: N/A;  cecal polyp (cold snare)  descending colon polyp  sigmoid colon polyp  rectal polyps x2  diverticulosis       (Not in a hospital admission)      Current Meds  Scheduled Meds:   Continuous Infusions:   PRN Meds:.•  diphenhydrAMINE  •  diphenhydrAMINE  •  ePHEDrine  •  fentanyl  •  flumazenil  •  hydrALAZINE  •  HYDROcodone-acetaminophen  •  HYDROmorphone  •  labetalol  •  naloxone  •  ondansetron  •  oxyCODONE-acetaminophen  •  promethazine **OR** promethazine    Allergies as of 01/25/2023 - Reviewed 01/25/2023   Allergen Reaction Noted   • Atorvastatin Myalgia 01/27/2022   • Lisinopril Cough 08/16/2016   • Rosuvastatin Myalgia 07/15/2022   • Sulfa antibiotics Nausea And Vomiting 12/20/2013   • Latex Rash 12/20/2013       Social History     Socioeconomic History   • Marital status:    Tobacco Use   • Smoking status: Never   • Smokeless tobacco: Never   Substance and Sexual Activity   • Alcohol use: Yes     Comment: socially   • Drug use: No   • Sexual activity: Never       Family History   Problem Relation Age of Onset   • Heart disease Mother    • Hypertension Mother    • Diabetes Mother    • Hypertension Father    • Melanoma Father    • Colon polyps Father    • Colon cancer Father    • Breast cancer Paternal Aunt        REVIEW OF SYSTEMS:   12 point ROS was performed and is negative except as outlined in HPI         Objective:   Temp:  [98.3 °F (36.8 °C)] 98.3 °F (36.8 °C)  Heart Rate:  [73] 73  Resp:  [16] 16  BP: (145)/(96) 145/96  There is no height or weight on file to calculate BMI.    Vitals:    01/25/23 0601   BP: 145/96   Pulse: 73   Resp: 16   Temp: 98.3 °F (36.8 °C)   SpO2: 99%       General Appearance:    Alert, cooperative, in no acute distress   Head:    Normocephalic, without obvious abnormality, atraumatic   Eyes:             Lids and lashes normal, conjunctivae and sclerae normal, no   icterus, no pallor, corneas clear   Ears:    Ears appear intact with no abnormalities noted   Throat:   No oral lesions, no thrush, oral mucosa moist   Neck:   No adenopathy, supple, trachea midline, no thyromegaly, no   carotid bruit, no JVD   Back:     No kyphosis present, no scoliosis present, no skin lesions, erythema or scars, no tenderness to palpation, range of motion normal   Lungs:     Clear to auscultation,respirations regular, even and unlabored    Heart:    irregular rhythm and normal rate, normal S1 and S2, no murmur, no gallop, no rub, no click   Chest Wall:    No abnormalities observed   Abdomen:     Normal bowel sounds, no masses, no organomegaly, soft, nontender, nondistended, no guarding, no rebound  tenderness   Extremities:   Moves all extremities well, no edema, no cyanosis, no redness   Pulses:   Pulses palpable and equal bilaterally. Normal radial, carotid, dorsalis pedis and posterior tibial pulses bilaterally.    Skin:  Psychiatric:   No bleeding, bruising or rash    Alert and oriented x 3, normal mood and affect                 Lab Review:      Results from last 7 days   Lab Units 01/25/23  0619   SODIUM mmol/L 140   POTASSIUM mmol/L 4.4   CHLORIDE mmol/L 106   CO2 mmol/L 24.8   BUN mg/dL 19   CREATININE mg/dL 0.99   CALCIUM mg/dL 9.6   GLUCOSE mg/dL 115*         @LABRCNTbnp@              @LABRCNTIP(chol,trig,hdl,ldl)    I personally viewed and interpreted the patient's EKG/Telemetry data  )  Patient Active Problem List   Diagnosis   • HTN (hypertension)   • HLD (hyperlipidemia)   • Left thyroid nodule   • Longstanding persistent atrial fibrillation (HCC)   • Goiter   • Vitamin D deficiency   • Obstructive sleep apnea syndrome   • Chronic rhinitis   • Type 2 diabetes mellitus without complication, without long-term current use of insulin (HCC)   • Diarrhea of presumed infectious origin   • Encounter for screening for  malignant neoplasm of colon   • Urinary frequency   • Cystitis   • DDD (degenerative disc disease), lumbar     Assessment and Plan:    1. Persistent atrial fibrillation, on Xarelto 20 mg daily, on diltiazem and Toprol-XL.  2. Hypertension. Controlled.   3. Hyperlipidemia.  Could not tolerate atorvastatin or rosuvastatin.  4. Obstructive sleep apnea. On CPAP.   5. Recurrent right ear infections.   6. Obesity and sedentary lifestyle.   7. Family history of coronary artery of coronary artery disease in her mother.   8.  Diabetes mellitus type 2.    CV today.  Risk and benefits explained to the patient and she is amenable to proceed.    Mayte Vu MD  01/25/23  07:14 EST.  Time spent in reviewing chart, discussion and examination:

## 2023-01-26 ENCOUNTER — TELEPHONE (OUTPATIENT)
Dept: CARDIOLOGY | Facility: CLINIC | Age: 63
End: 2023-01-26
Payer: COMMERCIAL

## 2023-01-26 RX ORDER — DILTIAZEM HYDROCHLORIDE 240 MG/1
240 CAPSULE, COATED, EXTENDED RELEASE ORAL NIGHTLY
Qty: 30 CAPSULE | Refills: 11
Start: 2023-01-26

## 2023-01-26 NOTE — TELEPHONE ENCOUNTER
Notified patient of recommendations. Patient verbalized understanding.    Tanika Nevarez RN  Triage [facility]

## 2023-01-26 NOTE — TELEPHONE ENCOUNTER
Patient had cardioversion yesterday and said that after taking her diltiazem and valsartan-hctz this morning she has felt dizzy and lightheaded. She checked her BP while we were on the phone twice and got 151/73 and 146/77 HR 71. Below are her medications:    Amiodarone 100mg HS  diltiazem 240mg daily  Metoprolol 200mg HS  Valsartan-hctz 160-12.5mg daily  xarelto 20mg daily    Tanika Nevarez RN  Triage [facility]

## 2023-02-07 DIAGNOSIS — E11.9 TYPE 2 DIABETES MELLITUS WITHOUT COMPLICATION, WITHOUT LONG-TERM CURRENT USE OF INSULIN: ICD-10-CM

## 2023-04-03 DIAGNOSIS — E11.9 TYPE 2 DIABETES MELLITUS WITHOUT COMPLICATION, WITHOUT LONG-TERM CURRENT USE OF INSULIN: ICD-10-CM

## 2023-04-03 NOTE — TELEPHONE ENCOUNTER
Rx Refill Note  Requested Prescriptions     Pending Prescriptions Disp Refills   • metFORMIN (GLUCOPHAGE) 500 MG tablet 360 tablet 0     Sig: Take 2 tablets by mouth 2 (Two) Times a Day.      Last office visit with prescribing clinician: 1/20/2023   Last telemedicine visit with prescribing clinician: 4/11/2023   Next office visit with prescribing clinician: 4/20/2023                         Would you like a call back once the refill request has been completed: [] Yes [] No    If the office needs to give you a call back, can they leave a voicemail: [] Yes [] No    Michelle Thompson MA  04/03/23, 16:14 EDT

## 2023-04-11 ENCOUNTER — TRANSCRIBE ORDERS (OUTPATIENT)
Dept: CARDIOLOGY | Facility: CLINIC | Age: 63
End: 2023-04-11
Payer: COMMERCIAL

## 2023-04-11 ENCOUNTER — OFFICE VISIT (OUTPATIENT)
Dept: CARDIOLOGY | Facility: CLINIC | Age: 63
End: 2023-04-11
Payer: COMMERCIAL

## 2023-04-11 VITALS
HEART RATE: 79 BPM | HEIGHT: 67 IN | OXYGEN SATURATION: 97 % | SYSTOLIC BLOOD PRESSURE: 130 MMHG | WEIGHT: 229 LBS | BODY MASS INDEX: 35.94 KG/M2 | RESPIRATION RATE: 16 BRPM | DIASTOLIC BLOOD PRESSURE: 70 MMHG

## 2023-04-11 DIAGNOSIS — E78.2 MIXED HYPERLIPIDEMIA: ICD-10-CM

## 2023-04-11 DIAGNOSIS — I10 PRIMARY HYPERTENSION: Primary | ICD-10-CM

## 2023-04-11 DIAGNOSIS — Z01.810 PRE-OPERATIVE CARDIOVASCULAR EXAMINATION: Primary | ICD-10-CM

## 2023-04-11 DIAGNOSIS — Z13.6 SCREENING FOR ISCHEMIC HEART DISEASE: ICD-10-CM

## 2023-04-11 DIAGNOSIS — I48.11 LONGSTANDING PERSISTENT ATRIAL FIBRILLATION: ICD-10-CM

## 2023-04-11 PROCEDURE — 99214 OFFICE O/P EST MOD 30 MIN: CPT | Performed by: NURSE PRACTITIONER

## 2023-04-11 PROCEDURE — 93000 ELECTROCARDIOGRAM COMPLETE: CPT | Performed by: NURSE PRACTITIONER

## 2023-04-11 RX ORDER — AMIODARONE HYDROCHLORIDE 200 MG/1
200 TABLET ORAL DAILY
Qty: 30 TABLET | Refills: 5 | Status: SHIPPED | OUTPATIENT
Start: 2023-04-11

## 2023-04-11 NOTE — PROGRESS NOTES
Date of Office Visit: 2023  Encounter Provider: QUIN Cordero  Place of Service: [facility] CARDIOLOGY  Patient Name: Claudia Gage  :1960  Primary Cardiologist: Dr. Vu    CC:  3 month follow up    Dear Darlin    HPI: Claudia Gage is a pleasant 62 y.o. female who presents 2023 for cardiac follow up. I have reviewed her past medical records including notes, labs and testing I preparation for today's visit.    She has a history of hypertension, hyperlipidemia, atrial fibrillation, obstructive sleep apnea for which she uses a CPAP. She has recurrent ear infections in the right ear.  She has a left thyroid nodule.      She does not smoke. She is  and works part-time and has four children who are grown. She has alcohol about once or twice per year. 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       Her mother  with coronary artery disease and she had hypertension and diabetes mellitus.      echocardiogram done 3/10/2020 showed ejection fraction 69%, normal diastolic function, normal cardiac chamber sizes, aortic valve calcification but no significant insufficiency or stenosis.       Labs on 2022 show glucose 108, otherwise normal CMP, total cholesterol 246, HDL 42, , VLDL 35, triglycerides 190.  She had a cardioversion done 2022 and went back into normal sinus rhythm but is back in atrial fibrillation today.     She saw Dr. Vu in 2022.  She had just gotten back from an [employer] cruise.  She and her family went and had a very hard time.  She is back in atrial fibrillation today.  She thought she felt her heart intermittently racing.  She is had no dizziness or syncope.  She is using her CPAP machine.  She has no orthopnea or PND.  She has no exertional dyspnea.  She has no chest pain or pressure.  She went off of rosuvastatin because it caused severe muscle pain.  She started her on pravastatin.     I saw her  12/2022 for 6-month follow-up.  She was unable to tolerate the pravastatin as well.  She denies any shortness of breath, lower extremity edema, dizziness or lightheadedness.  She has not had any syncope or presyncopal episodes.  She denies any chest pain, chest pressure or fatigue.  She will occasionally feel palpitations.  She is taking her medications as directed.  She had labs performed 9/30/2022 that showed a CMP with a glucose of 111.  Normal electrolytes and liver function testing.  Her hemoglobin A1c was 6.2.  TSH 1.640.  Lipid panel showed total cholesterol 208, HDL 48,  and triglycerides 265.  25 hydroxy vitamin D was 47.5.  CBC unremarkable. She cannot always tell when she is in atrial fibrillation and thinks she may be going in and out of it.  She states she does feel tired a lot and wonders if it is because of the irregular heart rate. She states she would be interested in trying another cardioversion.    On 1/10/2023, Patient had stopped by the office requesting samples.  While here she mentioned that she was still in atrial fibrillation and still had not heard back about a possible cardioversion.  I had her do an EKG which did show that she was indeed still in atrial fibrillation mildly tachycardic at 116.  She has not missed any doses of her Xarelto.  I am going to increase her diltiazem up to 240 mg daily and send a note to Dr. Vu as patient did question whether or not she would be a candidate for another cardioversion.      on 1/11/2023 Dr. Vu noted Please call and let her know that I think we should try cardioversion again.  I went the cardioversion to be done with me at [facility] with anesthesia early in the morning on a Wednesday or Thursday when I am in the office.  Could also be done a week when I am in the hospital.  I did send in amiodarone 200 mg once daily to be started in order to facilitate getting her back and rhythm.  I want her to go ahead and start this.   She had talked about cardioversion with Donell in the office so  she is aware of what this entails.    He did have cardioversion performed 2023 that was successful.    She returns today for follow-up.  She continues to be fatigued and feels palpitations.  Her EKG does show that she is back in atrial fibrillation with controlled rate in the 70s.  She denies any shortness of breath, lower extremity edema, dizziness or lightheadedness.  She has not had any chest pain or chest pressure.  She states compliance with her CPAP.  She states she can occasionally feel the irregular heart rate but she just gets very fatigued very easily.  She states she had previously talked to Dr. Vu about possible ablation or trying another cardioversion.  Past Medical History:   Diagnosis Date   • Atrial fibrillation    • Chronic rhinitis 10/15/2019   • Hyperlipidemia    • Hypertension    • Left thyroid nodule 2016   • Sleep apnea    • Type 2 diabetes mellitus without complication, without long-term current use of insulin 2019       Past Surgical History:   Procedure Laterality Date   • APPENDECTOMY     •  SECTION  1998   • COLONOSCOPY  2004    [facility]    • COLONOSCOPY W/ POLYPECTOMY N/A 2021    Procedure: COLONOSCOPY WITH POLYPECTOMY;  Surgeon: Lawrence Toussaint MD;  Location: [facility];  Service: Gastroenterology;  Laterality: N/A;  cecal polyp (cold snare)  descending colon polyp  sigmoid colon polyp  rectal polyps x2  diverticulosis       Social History     Socioeconomic History   • Marital status:    Tobacco Use   • Smoking status: Never   • Smokeless tobacco: Never   • Tobacco comments:     Rare caffiene   Substance and Sexual Activity   • Alcohol use: Yes     Comment: socially   • Drug use: No   • Sexual activity: Never       Family History   Problem Relation Age of Onset   • Heart disease Mother    • Hypertension Mother    • Diabetes Mother    •  Hypertension Father    • Melanoma Father    • Colon polyps Father    • Colon cancer Father    • Breast cancer Paternal Aunt        The following portion of the patient's history were reviewed and updated as appropriate: past medical history, past surgical history, past social history, past family history, allergies, current medications, and problem list.    Review of Systems   Constitutional: Positive for malaise/fatigue. Negative for diaphoresis and fever.   HENT: Negative for congestion, hearing loss, hoarse voice, nosebleeds and sore throat.    Eyes: Negative for photophobia, vision loss in left eye, vision loss in right eye and visual disturbance.   Cardiovascular: Positive for palpitations. Negative for chest pain, dyspnea on exertion, irregular heartbeat, leg swelling, near-syncope, orthopnea, paroxysmal nocturnal dyspnea and syncope.   Respiratory: Negative for cough, hemoptysis, shortness of breath, sleep disturbances due to breathing, snoring, sputum production and wheezing.    Endocrine: Negative for cold intolerance, heat intolerance, polydipsia, polyphagia and polyuria.   Hematologic/Lymphatic: Negative for bleeding problem. Does not bruise/bleed easily.   Skin: Negative for color change, dry skin, poor wound healing, rash and suspicious lesions.   Musculoskeletal: Negative for arthritis, back pain, falls, gout, joint pain, joint swelling, muscle cramps, muscle weakness and myalgias.   Gastrointestinal: Negative for bloating, abdominal pain, constipation, diarrhea, dysphagia, melena, nausea and vomiting.   Neurological: Negative for excessive daytime sleepiness, dizziness, headaches, light-headedness, loss of balance, numbness, paresthesias, seizures, vertigo and weakness.   Psychiatric/Behavioral: Negative for depression, memory loss and substance abuse. The patient is not nervous/anxious.        Allergies   Allergen Reactions   • Atorvastatin Myalgia   • Lisinopril Cough   • Rosuvastatin Myalgia   •  "Sulfa Antibiotics Nausea And Vomiting   • Latex Rash         Current Outpatient Medications:   •  amiodarone (PACERONE) 200 MG tablet, Take 1 tablet by mouth Daily., Disp: 30 tablet, Rfl: 5  •  clobetasol (TEMOVATE) 0.05 % cream, Apply  topically to the appropriate area as directed 2 (Two) Times a Day., Disp: 60 g, Rfl: 1  •  dilTIAZem CD (CARDIZEM CD) 240 MG 24 hr capsule, Take 1 capsule by mouth Every Night., Disp: 30 capsule, Rfl: 11  •  ezetimibe (ZETIA) 10 MG tablet, Take 1 tablet by mouth Daily., Disp: 90 tablet, Rfl: 3  •  fexofenadine (ALLEGRA) 60 MG tablet, Take 1 tablet by mouth Daily., Disp: , Rfl:   •  fluticasone (FLONASE) 50 MCG/ACT nasal spray, 2 sprays into each nostril Daily., Disp: 1 each, Rfl: 11  •  metFORMIN (GLUCOPHAGE) 500 MG tablet, Take 2 tablets by mouth 2 (Two) Times a Day., Disp: 360 tablet, Rfl: 1  •  metoprolol succinate XL (TOPROL-XL) 200 MG 24 hr tablet, Take 1 tablet by mouth every night at bedtime., Disp: 90 tablet, Rfl: 1  •  Probiotic Product (PROBIOTIC-10 PO), Take  by mouth., Disp: , Rfl:   •  valsartan-hydrochlorothiazide (DIOVAN-HCT) 160-12.5 MG per tablet, TAKE 1 TABLET BY MOUTH EVERY DAY, Disp: 90 tablet, Rfl: 2  •  vitamin C (ASCORBIC ACID) 250 MG tablet, Take 1 tablet by mouth Daily., Disp: , Rfl:   •  vitamin D (ERGOCALCIFEROL) 1.25 MG (74699 UT) capsule capsule, TAKE 1 CAPSULE BY MOUTH ONE TIME PER WEEK, Disp: 12 capsule, Rfl: 4  •  Xarelto 20 MG tablet, TAKE 1 TABLET BY MOUTH EVERY DAY, Disp: 90 tablet, Rfl: 2  •  Zinc 50 MG tablet, Take  by mouth., Disp: , Rfl:         Objective:     Vitals:    04/11/23 1024   BP: 130/70   Pulse: 79   Resp: 16   SpO2: 97%   Weight: 104 kg (229 lb)   Height: 170.2 cm (67\")     Body mass index is 35.87 kg/m².      Vitals reviewed.   Constitutional:       General: Not in acute distress.     Appearance: Well-developed.   Eyes:      General:         Right eye: No discharge.         Left eye: No discharge.      Conjunctiva/sclera: " Conjunctivae normal.   HENT:      Head: Normocephalic and atraumatic.      Right Ear: External ear normal.      Left Ear: External ear normal.      Nose: Nose normal.   Neck:      Thyroid: No thyromegaly.      Vascular: No JVD.      Trachea: No tracheal deviation.      Lymphadenopathy: No cervical adenopathy.   Pulmonary:      Effort: Pulmonary effort is normal. No respiratory distress.      Breath sounds: Normal breath sounds. No wheezing. No rales.   Chest:      Chest wall: Not tender to palpatation.   Cardiovascular:      Normal rate. Regular rhythm.      No gallop.   Pulses:     Intact distal pulses.   Edema:     Peripheral edema absent.   Abdominal:      General: There is no distension.      Palpations: Abdomen is soft.      Tenderness: There is no abdominal tenderness.   Musculoskeletal: Normal range of motion.         General: No tenderness or deformity.      Cervical back: Normal range of motion and neck supple. Skin:     General: Skin is warm and dry.      Findings: No erythema or rash.   Neurological:      Mental Status: Alert and oriented to person, place, and time.      Coordination: Coordination normal.   Psychiatric:         Attention and Perception: Attention normal.         Behavior: Behavior normal.         Thought Content: Thought content normal.         Judgment: Judgment normal.               ECG 12 Lead    Date/Time: 4/11/2023 10:33 AM  Performed by: Mayte Miranda APRN  Authorized by: Mayte Miranda APRN   Comparison: compared with previous ECG from 1/25/2023  Rhythm: atrial fibrillation  Rate: normal  Conduction: conduction normal  ST Segments: ST segments normal  T Waves: T waves normal  QRS axis: normal    Clinical impression: abnormal EKG              Assessment:       Diagnosis Plan   1. Primary hypertension  ECG 12 Lead      2. Mixed hyperlipidemia  ECG 12 Lead      3. Longstanding persistent atrial fibrillation (HCC)  ECG 12 Lead             Plan:       1. Persistent atrial fibrillation,  on Xarelto 20 mg daily.  She has not missed any doses.  She did have a successful cardioversion performed January 2023 but felt like she was back in atrial fibrillation in mid February.  She is currently on metoprolol  mg at at bedtime.  Diltiazem 240 mg in the morning and amiodarone 100 mg daily.  I am going to increase her amiodarone back up to 200 mg daily.  She would like to try another cardioversion and if that is unsuccessful we will then contemplate ablation.  I will send a note to Dr. Vu.  2. Hypertension. - controlled  3. Hyperlipidemia.  Could not tolerate atorvastatin or rosuvastatin or pravastatin secondary to myalgias.    She did try Livalo but insurance would not cover the cost.  She is currently on Zetia 10 mg daily.    4. Obstructive sleep apnea. On CPAP. States compliance  5. Recurrent right ear infections.   6. Obesity and sedentary lifestyle.   7. Family history of coronary artery of coronary artery disease in her mother.   8.  Diabetes mellitus type 2. -labs reviewed as above.    Increase amiodarone to 200 mg daily.  Note to  about another cardioversion    Addendum- I did hear back from Horace and she stated that patient could have another cardioversion as early as this week if that can be arranged.  We will call and talk to the patient.    As always, it has been a pleasure to participate in your patient's care. Thank you.       Sincerely,       QUIN Cordero      Current Outpatient Medications:   •  amiodarone (PACERONE) 200 MG tablet, Take 1 tablet by mouth Daily., Disp: 30 tablet, Rfl: 5  •  clobetasol (TEMOVATE) 0.05 % cream, Apply  topically to the appropriate area as directed 2 (Two) Times a Day., Disp: 60 g, Rfl: 1  •  dilTIAZem CD (CARDIZEM CD) 240 MG 24 hr capsule, Take 1 capsule by mouth Every Night., Disp: 30 capsule, Rfl: 11  •  ezetimibe (ZETIA) 10 MG tablet, Take 1 tablet by mouth Daily., Disp: 90 tablet, Rfl: 3  •  fexofenadine (ALLEGRA) 60 MG tablet, Take 1  tablet by mouth Daily., Disp: , Rfl:   •  fluticasone (FLONASE) 50 MCG/ACT nasal spray, 2 sprays into each nostril Daily., Disp: 1 each, Rfl: 11  •  metFORMIN (GLUCOPHAGE) 500 MG tablet, Take 2 tablets by mouth 2 (Two) Times a Day., Disp: 360 tablet, Rfl: 1  •  metoprolol succinate XL (TOPROL-XL) 200 MG 24 hr tablet, Take 1 tablet by mouth every night at bedtime., Disp: 90 tablet, Rfl: 1  •  Probiotic Product (PROBIOTIC-10 PO), Take  by mouth., Disp: , Rfl:   •  valsartan-hydrochlorothiazide (DIOVAN-HCT) 160-12.5 MG per tablet, TAKE 1 TABLET BY MOUTH EVERY DAY, Disp: 90 tablet, Rfl: 2  •  vitamin C (ASCORBIC ACID) 250 MG tablet, Take 1 tablet by mouth Daily., Disp: , Rfl:   •  vitamin D (ERGOCALCIFEROL) 1.25 MG (97324 UT) capsule capsule, TAKE 1 CAPSULE BY MOUTH ONE TIME PER WEEK, Disp: 12 capsule, Rfl: 4  •  Xarelto 20 MG tablet, TAKE 1 TABLET BY MOUTH EVERY DAY, Disp: 90 tablet, Rfl: 2  •  Zinc 50 MG tablet, Take  by mouth., Disp: , Rfl:       Dictated utilizing Dragon dictation

## 2023-04-11 NOTE — LETTER
2023       No Recipients    Patient: Claudia Gage   YOB: 1960   Date of Visit: 2023       Dear Dr. Benson Recipients:    Thank you for referring Claudia Gage to me for evaluation. Below are the relevant portions of my assessment and plan of care.    If you have questions, please do not hesitate to call me. I look forward to following Claudia along with you.         Sincerely,        QUIN Cash        CC:   No Recipients    Mayte Miranda APRN  [phone]  Signed    Date of Office Visit: 2023  Encounter Provider: QUIN Cordero  Place of Service: [facility] CARDIOLOGY  Patient Name: Claudia Gage  :1960  Primary Cardiologist: Dr. Vu    CC:  3 month follow up    Dear Darlin    HPI: Claudia Gage is a pleasant 62 y.o. female who presents 2023 for cardiac follow up. I have reviewed her past medical records including notes, labs and testing I preparation for today's visit.    She has a history of hypertension, hyperlipidemia, atrial fibrillation, obstructive sleep apnea for which she uses a CPAP. She has recurrent ear infections in the right ear.  She has a left thyroid nodule.      She does not smoke. She is  and works part-time and has four children who are grown. She has alcohol about once or twice per year. 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       Her mother  with coronary artery disease and she had hypertension and diabetes mellitus.      echocardiogram done 3/10/2020 showed ejection fraction 69%, normal diastolic function, normal cardiac chamber sizes, aortic valve calcification but no significant insufficiency or stenosis.       Labs on 2022 show glucose 108, otherwise normal CMP, total cholesterol 246, HDL 42, , VLDL 35, triglycerides 190.  She had a cardioversion done 2022 and went back into normal sinus rhythm but is back in atrial fibrillation today.     She saw   Horace in July 2022.  She had just gotten back from an [employer] cruise.  She and her family went and had a very hard time.  She is back in atrial fibrillation today.  She thought she felt her heart intermittently racing.  She is had no dizziness or syncope.  She is using her CPAP machine.  She has no orthopnea or PND.  She has no exertional dyspnea.  She has no chest pain or pressure.  She went off of rosuvastatin because it caused severe muscle pain.  She started her on pravastatin.     I saw her 12/2022 for 6-month follow-up.  She was unable to tolerate the pravastatin as well.  She denies any shortness of breath, lower extremity edema, dizziness or lightheadedness.  She has not had any syncope or presyncopal episodes.  She denies any chest pain, chest pressure or fatigue.  She will occasionally feel palpitations.  She is taking her medications as directed.  She had labs performed 9/30/2022 that showed a CMP with a glucose of 111.  Normal electrolytes and liver function testing.  Her hemoglobin A1c was 6.2.  TSH 1.640.  Lipid panel showed total cholesterol 208, HDL 48,  and triglycerides 265.  25 hydroxy vitamin D was 47.5.  CBC unremarkable. She cannot always tell when she is in atrial fibrillation and thinks she may be going in and out of it.  She states she does feel tired a lot and wonders if it is because of the irregular heart rate. She states she would be interested in trying another cardioversion.    On 1/10/2023, Patient had stopped by the office requesting samples.  While here she mentioned that she was still in atrial fibrillation and still had not heard back about a possible cardioversion.  I had her do an EKG which did show that she was indeed still in atrial fibrillation mildly tachycardic at 116.  She has not missed any doses of her Xarelto.  I am going to increase her diltiazem up to 240 mg daily and send a note to Dr. Vu as patient did question whether or not she would be a  candidate for another cardioversion.      on 2023 Dr. Vu noted Please call and let her know that I think we should try cardioversion again.  I went the cardioversion to be done with me at [facility] with anesthesia early in the morning on a Wednesday or Thursday when I am in the office.  Could also be done a week when I am in the hospital.  I did send in amiodarone 200 mg once daily to be started in order to facilitate getting her back and rhythm.  I want her to go ahead and start this.  She had talked about cardioversion with Donell in the office so  she is aware of what this entails.    He did have cardioversion performed 2023 that was successful.    She returns today for follow-up.  She continues to be fatigued and feels palpitations.  Her EKG does show that she is back in atrial fibrillation with controlled rate in the 70s.  She denies any shortness of breath, lower extremity edema, dizziness or lightheadedness.  She has not had any chest pain or chest pressure.  She states compliance with her CPAP.  She states she can occasionally feel the irregular heart rate but she just gets very fatigued very easily.  She states she had previously talked to Dr. Vu about possible ablation or trying another cardioversion.  Past Medical History:   Diagnosis Date   • Atrial fibrillation    • Chronic rhinitis 10/15/2019   • Hyperlipidemia    • Hypertension    • Left thyroid nodule 2016   • Sleep apnea    • Type 2 diabetes mellitus without complication, without long-term current use of insulin 2019       Past Surgical History:   Procedure Laterality Date   • APPENDECTOMY     •  SECTION  1998   • COLONOSCOPY  2004    [facility]    • COLONOSCOPY W/ POLYPECTOMY N/A 2021    Procedure: COLONOSCOPY WITH POLYPECTOMY;  Surgeon: Lawrence Toussaint MD;  Location: [facility];  Service: Gastroenterology;  Laterality: N/A;  cecal polyp (cold  snare)  descending colon polyp  sigmoid colon polyp  rectal polyps x2  diverticulosis       Social History     Socioeconomic History   • Marital status:    Tobacco Use   • Smoking status: Never   • Smokeless tobacco: Never   • Tobacco comments:     Rare caffiene   Substance and Sexual Activity   • Alcohol use: Yes     Comment: socially   • Drug use: No   • Sexual activity: Never       Family History   Problem Relation Age of Onset   • Heart disease Mother    • Hypertension Mother    • Diabetes Mother    • Hypertension Father    • Melanoma Father    • Colon polyps Father    • Colon cancer Father    • Breast cancer Paternal Aunt        The following portion of the patient's history were reviewed and updated as appropriate: past medical history, past surgical history, past social history, past family history, allergies, current medications, and problem list.    Review of Systems   Constitutional: Positive for malaise/fatigue. Negative for diaphoresis and fever.   HENT: Negative for congestion, hearing loss, hoarse voice, nosebleeds and sore throat.    Eyes: Negative for photophobia, vision loss in left eye, vision loss in right eye and visual disturbance.   Cardiovascular: Positive for palpitations. Negative for chest pain, dyspnea on exertion, irregular heartbeat, leg swelling, near-syncope, orthopnea, paroxysmal nocturnal dyspnea and syncope.   Respiratory: Negative for cough, hemoptysis, shortness of breath, sleep disturbances due to breathing, snoring, sputum production and wheezing.    Endocrine: Negative for cold intolerance, heat intolerance, polydipsia, polyphagia and polyuria.   Hematologic/Lymphatic: Negative for bleeding problem. Does not bruise/bleed easily.   Skin: Negative for color change, dry skin, poor wound healing, rash and suspicious lesions.   Musculoskeletal: Negative for arthritis, back pain, falls, gout, joint pain, joint swelling, muscle cramps, muscle weakness and myalgias.    Gastrointestinal: Negative for bloating, abdominal pain, constipation, diarrhea, dysphagia, melena, nausea and vomiting.   Neurological: Negative for excessive daytime sleepiness, dizziness, headaches, light-headedness, loss of balance, numbness, paresthesias, seizures, vertigo and weakness.   Psychiatric/Behavioral: Negative for depression, memory loss and substance abuse. The patient is not nervous/anxious.        Allergies   Allergen Reactions   • Atorvastatin Myalgia   • Lisinopril Cough   • Rosuvastatin Myalgia   • Sulfa Antibiotics Nausea And Vomiting   • Latex Rash         Current Outpatient Medications:   •  amiodarone (PACERONE) 200 MG tablet, Take 0.5 tablets by mouth Daily., Disp: 30 tablet, Rfl: 5  •  clobetasol (TEMOVATE) 0.05 % cream, Apply  topically to the appropriate area as directed 2 (Two) Times a Day., Disp: 60 g, Rfl: 1  •  dilTIAZem CD (CARDIZEM CD) 240 MG 24 hr capsule, Take 1 capsule by mouth Every Night., Disp: 30 capsule, Rfl: 11  •  ezetimibe (ZETIA) 10 MG tablet, Take 1 tablet by mouth Daily., Disp: 90 tablet, Rfl: 3  •  fexofenadine (ALLEGRA) 60 MG tablet, Take 1 tablet by mouth Daily., Disp: , Rfl:   •  fluticasone (FLONASE) 50 MCG/ACT nasal spray, 2 sprays into each nostril Daily., Disp: 1 each, Rfl: 11  •  metFORMIN (GLUCOPHAGE) 500 MG tablet, Take 2 tablets by mouth 2 (Two) Times a Day., Disp: 360 tablet, Rfl: 1  •  metoprolol succinate XL (TOPROL-XL) 200 MG 24 hr tablet, Take 1 tablet by mouth every night at bedtime., Disp: 90 tablet, Rfl: 1  •  Probiotic Product (PROBIOTIC-10 PO), Take  by mouth., Disp: , Rfl:   •  valsartan-hydrochlorothiazide (DIOVAN-HCT) 160-12.5 MG per tablet, TAKE 1 TABLET BY MOUTH EVERY DAY, Disp: 90 tablet, Rfl: 2  •  vitamin C (ASCORBIC ACID) 250 MG tablet, Take 1 tablet by mouth Daily., Disp: , Rfl:   •  vitamin D (ERGOCALCIFEROL) 1.25 MG (05528 UT) capsule capsule, TAKE 1 CAPSULE BY MOUTH ONE TIME PER WEEK, Disp: 12 capsule, Rfl: 4  •  Xarelto 20 MG  tablet, TAKE 1 TABLET BY MOUTH EVERY DAY, Disp: 90 tablet, Rfl: 2  •  Zinc 50 MG tablet, Take  by mouth., Disp: , Rfl:        Objective:     There were no vitals filed for this visit.  There is no height or weight on file to calculate BMI.      Vitals reviewed.   Constitutional:       General: Not in acute distress.     Appearance: Well-developed.   Eyes:      General:         Right eye: No discharge.         Left eye: No discharge.      Conjunctiva/sclera: Conjunctivae normal.   HENT:      Head: Normocephalic and atraumatic.      Right Ear: External ear normal.      Left Ear: External ear normal.      Nose: Nose normal.   Neck:      Thyroid: No thyromegaly.      Vascular: No JVD.      Trachea: No tracheal deviation.      Lymphadenopathy: No cervical adenopathy.   Pulmonary:      Effort: Pulmonary effort is normal. No respiratory distress.      Breath sounds: Normal breath sounds. No wheezing. No rales.   Chest:      Chest wall: Not tender to palpatation.   Cardiovascular:      Normal rate. Regular rhythm.      No gallop.   Pulses:     Intact distal pulses.   Edema:     Peripheral edema absent.   Abdominal:      General: There is no distension.      Palpations: Abdomen is soft.      Tenderness: There is no abdominal tenderness.   Musculoskeletal: Normal range of motion.         General: No tenderness or deformity.      Cervical back: Normal range of motion and neck supple. Skin:     General: Skin is warm and dry.      Findings: No erythema or rash.   Neurological:      Mental Status: Alert and oriented to person, place, and time.      Coordination: Coordination normal.   Psychiatric:         Attention and Perception: Attention normal.         Behavior: Behavior normal.         Thought Content: Thought content normal.         Judgment: Judgment normal.               ECG 12 Lead    Date/Time: 4/11/2023 10:33 AM  Performed by: Mayte Miranda APRN  Authorized by: Mayte Miranda APRN   Comparison: compared with previous  ECG from 1/25/2023  Rhythm: atrial fibrillation  Rate: normal  Conduction: conduction normal  ST Segments: ST segments normal  T Waves: T waves normal  QRS axis: normal    Clinical impression: abnormal EKG              Assessment:       Diagnosis Plan   1. Primary hypertension        2. Mixed hyperlipidemia        3. Longstanding persistent atrial fibrillation (HCC)               Plan:       1. Persistent atrial fibrillation, on Xarelto 20 mg daily.  She has not missed any doses.  She did have a successful cardioversion performed January 2023 but felt like she was back in atrial fibrillation in mid February.  She is currently on metoprolol  mg at at bedtime.  Diltiazem 240 mg in the morning and amiodarone 100 mg daily.  I am going to increase her amiodarone back up to 200 mg daily.  She would like to try another cardioversion and if that is unsuccessful we will then contemplate ablation.  I will send a note to Dr. Vu.  2. Hypertension. - controlled  3. Hyperlipidemia.  Could not tolerate atorvastatin or rosuvastatin or pravastatin secondary to myalgias.    She did try Livalo but insurance would not cover the cost.  She is currently on Zetia 10 mg daily.    4. Obstructive sleep apnea. On CPAP. States compliance  5. Recurrent right ear infections.   6. Obesity and sedentary lifestyle.   7. Family history of coronary artery of coronary artery disease in her mother.   8.  Diabetes mellitus type 2. -labs reviewed as above.    Increase amiodarone to 200 mg daily.  Note to RM about another cardioversion    As always, it has been a pleasure to participate in your patient's care. Thank you.       Sincerely,       QUIN Cordero      Current Outpatient Medications:   •  amiodarone (PACERONE) 200 MG tablet, Take 1 tablet by mouth Daily., Disp: 30 tablet, Rfl: 5  •  clobetasol (TEMOVATE) 0.05 % cream, Apply  topically to the appropriate area as directed 2 (Two) Times a Day., Disp: 60 g, Rfl: 1  •  dilTIAZem CD  (CARDIZEM CD) 240 MG 24 hr capsule, Take 1 capsule by mouth Every Night., Disp: 30 capsule, Rfl: 11  •  ezetimibe (ZETIA) 10 MG tablet, Take 1 tablet by mouth Daily., Disp: 90 tablet, Rfl: 3  •  fexofenadine (ALLEGRA) 60 MG tablet, Take 1 tablet by mouth Daily., Disp: , Rfl:   •  fluticasone (FLONASE) 50 MCG/ACT nasal spray, 2 sprays into each nostril Daily., Disp: 1 each, Rfl: 11  •  metFORMIN (GLUCOPHAGE) 500 MG tablet, Take 2 tablets by mouth 2 (Two) Times a Day., Disp: 360 tablet, Rfl: 1  •  metoprolol succinate XL (TOPROL-XL) 200 MG 24 hr tablet, Take 1 tablet by mouth every night at bedtime., Disp: 90 tablet, Rfl: 1  •  Probiotic Product (PROBIOTIC-10 PO), Take  by mouth., Disp: , Rfl:   •  valsartan-hydrochlorothiazide (DIOVAN-HCT) 160-12.5 MG per tablet, TAKE 1 TABLET BY MOUTH EVERY DAY, Disp: 90 tablet, Rfl: 2  •  vitamin C (ASCORBIC ACID) 250 MG tablet, Take 1 tablet by mouth Daily., Disp: , Rfl:   •  vitamin D (ERGOCALCIFEROL) 1.25 MG (16009 UT) capsule capsule, TAKE 1 CAPSULE BY MOUTH ONE TIME PER WEEK, Disp: 12 capsule, Rfl: 4  •  Xarelto 20 MG tablet, TAKE 1 TABLET BY MOUTH EVERY DAY, Disp: 90 tablet, Rfl: 2  •  Zinc 50 MG tablet, Take  by mouth., Disp: , Rfl:       Dictated utilizing Dragon dictation

## 2023-04-12 ENCOUNTER — LAB (OUTPATIENT)
Dept: LAB | Facility: HOSPITAL | Age: 63
End: 2023-04-12
Payer: COMMERCIAL

## 2023-04-12 DIAGNOSIS — Z13.6 SCREENING FOR ISCHEMIC HEART DISEASE: ICD-10-CM

## 2023-04-12 DIAGNOSIS — Z01.810 PRE-OPERATIVE CARDIOVASCULAR EXAMINATION: ICD-10-CM

## 2023-04-12 LAB
ANION GAP SERPL CALCULATED.3IONS-SCNC: 10 MMOL/L (ref 5–15)
BASOPHILS # BLD AUTO: 0.06 10*3/MM3 (ref 0–0.2)
BASOPHILS NFR BLD AUTO: 0.9 % (ref 0–1.5)
BUN SERPL-MCNC: 20 MG/DL (ref 8–23)
BUN/CREAT SERPL: 17.7 (ref 7–25)
CALCIUM SPEC-SCNC: 9.2 MG/DL (ref 8.6–10.5)
CHLORIDE SERPL-SCNC: 104 MMOL/L (ref 98–107)
CO2 SERPL-SCNC: 26 MMOL/L (ref 22–29)
CREAT SERPL-MCNC: 1.13 MG/DL (ref 0.57–1)
DEPRECATED RDW RBC AUTO: 39.2 FL (ref 37–54)
EGFRCR SERPLBLD CKD-EPI 2021: 55.1 ML/MIN/1.73
EOSINOPHIL # BLD AUTO: 0.25 10*3/MM3 (ref 0–0.4)
EOSINOPHIL NFR BLD AUTO: 3.8 % (ref 0.3–6.2)
ERYTHROCYTE [DISTWIDTH] IN BLOOD BY AUTOMATED COUNT: 12.5 % (ref 12.3–15.4)
GLUCOSE SERPL-MCNC: 127 MG/DL (ref 65–99)
HCT VFR BLD AUTO: 40.9 % (ref 34–46.6)
HGB BLD-MCNC: 13.3 G/DL (ref 12–15.9)
IMM GRANULOCYTES # BLD AUTO: 0.01 10*3/MM3 (ref 0–0.05)
IMM GRANULOCYTES NFR BLD AUTO: 0.2 % (ref 0–0.5)
LYMPHOCYTES # BLD AUTO: 2.36 10*3/MM3 (ref 0.7–3.1)
LYMPHOCYTES NFR BLD AUTO: 36 % (ref 19.6–45.3)
MCH RBC QN AUTO: 27.6 PG (ref 26.6–33)
MCHC RBC AUTO-ENTMCNC: 32.5 G/DL (ref 31.5–35.7)
MCV RBC AUTO: 84.9 FL (ref 79–97)
MONOCYTES # BLD AUTO: 0.76 10*3/MM3 (ref 0.1–0.9)
MONOCYTES NFR BLD AUTO: 11.6 % (ref 5–12)
NEUTROPHILS NFR BLD AUTO: 3.11 10*3/MM3 (ref 1.7–7)
NEUTROPHILS NFR BLD AUTO: 47.5 % (ref 42.7–76)
NRBC BLD AUTO-RTO: 0 /100 WBC (ref 0–0.2)
PLATELET # BLD AUTO: 336 10*3/MM3 (ref 140–450)
PMV BLD AUTO: 9.7 FL (ref 6–12)
POTASSIUM SERPL-SCNC: 5.1 MMOL/L (ref 3.5–5.2)
RBC # BLD AUTO: 4.82 10*6/MM3 (ref 3.77–5.28)
SODIUM SERPL-SCNC: 140 MMOL/L (ref 136–145)
WBC NRBC COR # BLD: 6.55 10*3/MM3 (ref 3.4–10.8)

## 2023-04-12 PROCEDURE — 80048 BASIC METABOLIC PNL TOTAL CA: CPT

## 2023-04-12 PROCEDURE — 85025 COMPLETE CBC W/AUTO DIFF WBC: CPT

## 2023-04-12 PROCEDURE — 36415 COLL VENOUS BLD VENIPUNCTURE: CPT

## 2023-04-14 ENCOUNTER — HOSPITAL ENCOUNTER (OUTPATIENT)
Dept: POSTOP/PACU | Facility: HOSPITAL | Age: 63
Discharge: HOME | End: 2023-04-14
Payer: COMMERCIAL

## 2023-04-14 ENCOUNTER — ANESTHESIA EVENT (OUTPATIENT)
Dept: POSTOP/PACU | Facility: HOSPITAL | Age: 63
End: 2023-04-14
Payer: COMMERCIAL

## 2023-04-14 ENCOUNTER — ANESTHESIA (OUTPATIENT)
Dept: POSTOP/PACU | Facility: HOSPITAL | Age: 63
End: 2023-04-14
Payer: COMMERCIAL

## 2023-04-14 VITALS
DIASTOLIC BLOOD PRESSURE: 90 MMHG | OXYGEN SATURATION: 93 % | RESPIRATION RATE: 16 BRPM | HEART RATE: 64 BPM | TEMPERATURE: 98.5 F | SYSTOLIC BLOOD PRESSURE: 121 MMHG

## 2023-04-14 VITALS — SYSTOLIC BLOOD PRESSURE: 135 MMHG | DIASTOLIC BLOOD PRESSURE: 85 MMHG | OXYGEN SATURATION: 100 % | HEART RATE: 66 BPM

## 2023-04-14 DIAGNOSIS — I48.11 LONGSTANDING PERSISTENT ATRIAL FIBRILLATION: ICD-10-CM

## 2023-04-14 LAB
ANION GAP SERPL CALCULATED.3IONS-SCNC: 10.3 MMOL/L (ref 5–15)
BUN SERPL-MCNC: 19 MG/DL (ref 8–23)
BUN/CREAT SERPL: 18.8 (ref 7–25)
CALCIUM SPEC-SCNC: 9.8 MG/DL (ref 8.6–10.5)
CHLORIDE SERPL-SCNC: 107 MMOL/L (ref 98–107)
CO2 SERPL-SCNC: 23.7 MMOL/L (ref 22–29)
CREAT SERPL-MCNC: 1.01 MG/DL (ref 0.57–1)
EGFRCR SERPLBLD CKD-EPI 2021: 63.1 ML/MIN/1.73
GLUCOSE SERPL-MCNC: 114 MG/DL (ref 65–99)
POTASSIUM SERPL-SCNC: 4.5 MMOL/L (ref 3.5–5.2)
QT INTERVAL: 431 MS
QT INTERVAL: 457 MS
SODIUM SERPL-SCNC: 141 MMOL/L (ref 136–145)

## 2023-04-14 PROCEDURE — 93005 ELECTROCARDIOGRAM TRACING: CPT | Performed by: INTERNAL MEDICINE

## 2023-04-14 PROCEDURE — 80048 BASIC METABOLIC PNL TOTAL CA: CPT | Performed by: INTERNAL MEDICINE

## 2023-04-14 PROCEDURE — 92960 CARDIOVERSION ELECTRIC EXT: CPT

## 2023-04-14 PROCEDURE — 25010000002 PROPOFOL 10 MG/ML EMULSION: Performed by: NURSE ANESTHETIST, CERTIFIED REGISTERED

## 2023-04-14 RX ORDER — SODIUM CHLORIDE 9 MG/ML
INJECTION, SOLUTION INTRAVENOUS CONTINUOUS PRN
Status: DISCONTINUED | OUTPATIENT
Start: 2023-04-14 | End: 2023-04-14 | Stop reason: SURG

## 2023-04-14 RX ORDER — PROPOFOL 10 MG/ML
VIAL (ML) INTRAVENOUS AS NEEDED
Status: DISCONTINUED | OUTPATIENT
Start: 2023-04-14 | End: 2023-04-14 | Stop reason: SURG

## 2023-04-14 RX ADMIN — SODIUM CHLORIDE: 9 INJECTION, SOLUTION INTRAVENOUS at 07:30

## 2023-04-14 RX ADMIN — PROPOFOL 50 MG: 10 INJECTION, EMULSION INTRAVENOUS at 07:39

## 2023-04-14 RX ADMIN — PROPOFOL 50 MG: 10 INJECTION, EMULSION INTRAVENOUS at 07:40

## 2023-04-14 NOTE — ADDENDUM NOTE
Addendum  created 04/14/23 0928 by Vijay Vang MD    Attestation recorded in Intraprocedure, Intraprocedure Attestations filed

## 2023-04-14 NOTE — ANESTHESIA PREPROCEDURE EVALUATION
Anesthesia Evaluation     NPO Solid Status: > 8 hours             Airway   Mallampati: III  Dental          Pulmonary    (+) sleep apnea on CPAP,   (-) asthma, not a smoker    ROS comment: Positive SIMIN screen/Diagnosis, 2 or more mitigating factors used (non-supine position, no narcotics)    Cardiovascular     (+) hypertension, dysrhythmias Atrial Fib, hyperlipidemia,   (-) CAD, angina, WIGGINS      Neuro/Psych  GI/Hepatic/Renal/Endo    (+) obesity,   diabetes mellitus type 2, thyroid problem     Musculoskeletal     Abdominal    Substance History      OB/GYN          Other                        Anesthesia Plan    ASA 3     general       Anesthetic plan, risks, benefits, and alternatives have been provided, discussed and informed consent has been obtained with: patient.        CODE STATUS:

## 2023-04-14 NOTE — PROGRESS NOTES
Date of Hospital Visit: [unfilled]ENC@  Encounter Provider: Mayte Vu MD  Place of Service: [facility] CARDIOLOGY  Patient Name: Claudia Gage  :1960  Referral Provider: Mayte Miranda APRN    Chief complaint    History of Present Illness    She has a history of hypertension, hyperlipidemia, atrial fibrillation, obstructive sleep apnea for which she uses a CPAP. She has recurrent ear infections in the right ear.  She has a left thyroid nodule.      She does not smoke. She is  and works part-time and has four children who are grown. She has alcohol about once or twice per year. 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       Her mother  with coronary artery disease and she had hypertension and diabetes mellitus.      echocardiogram done 3/10/2020 showed ejection fraction 69%, normal diastolic function, normal cardiac chamber sizes, aortic valve calcification but no significant insufficiency or stenosis.       Labs on 2022 show glucose 108, otherwise normal CMP, total cholesterol 246, HDL 42, , VLDL 35, triglycerides 190.  She had a cardioversion done 2022 and went back into normal sinus rhythm but is back in atrial fibrillation today.     She was in atrial fibrillation and having more short windedness and fatigue.  She was placed on amiodarone in the office and had not missed any doses of Xarelto and prepared then to do a cardioversion.       Past Medical History:   Diagnosis Date   • Atrial fibrillation    • Chronic rhinitis 10/15/2019   • Hyperlipidemia    • Hypertension    • Left thyroid nodule 2016   • Sleep apnea    • Type 2 diabetes mellitus without complication, without long-term current use of insulin 2019       Past Surgical History:   Procedure Laterality Date   • APPENDECTOMY     •  SECTION  1998   • COLONOSCOPY  2004    [facility]    • COLONOSCOPY W/ POLYPECTOMY N/A  11/13/2021    Procedure: COLONOSCOPY WITH POLYPECTOMY;  Surgeon: Lawrence Toussaint MD;  Location: [facility];  Service: Gastroenterology;  Laterality: N/A;  cecal polyp (cold snare)  descending colon polyp  sigmoid colon polyp  rectal polyps x2  diverticulosis       (Not in a hospital admission)      Current Meds  Scheduled Meds:  Continuous Infusions:No current facility-administered medications for this encounter.    PRN Meds:.    Allergies as of 04/14/2023 - Reviewed 04/14/2023   Allergen Reaction Noted   • Atorvastatin Myalgia 01/27/2022   • Lisinopril Cough 08/16/2016   • Rosuvastatin Myalgia 07/15/2022   • Sulfa antibiotics Nausea And Vomiting 12/20/2013   • Latex Rash 12/20/2013       Social History     Socioeconomic History   • Marital status:    Tobacco Use   • Smoking status: Never   • Smokeless tobacco: Never   • Tobacco comments:     Rare caffiene   Substance and Sexual Activity   • Alcohol use: Yes     Comment: socially   • Drug use: No   • Sexual activity: Never       Family History   Problem Relation Age of Onset   • Heart disease Mother    • Hypertension Mother    • Diabetes Mother    • Hypertension Father    • Melanoma Father    • Colon polyps Father    • Colon cancer Father    • Breast cancer Paternal Aunt        REVIEW OF SYSTEMS:   12 point ROS was performed and is negative except as outlined in HPI        Objective:   Temp:  [98.2 °F (36.8 °C)] 98.2 °F (36.8 °C)  Heart Rate:  [77] 77  Resp:  [16] 16  BP: (143)/(90) 143/90  There is no height or weight on file to calculate BMI.    Vitals:    04/14/23 0555   BP: 143/90   Pulse: 77   Resp: 16   Temp: 98.2 °F (36.8 °C)   SpO2: 96%       General Appearance:    Alert, cooperative, in no acute distress   Head:    Normocephalic, without obvious abnormality, atraumatic   Eyes:            Lids and lashes normal, conjunctivae and sclerae normal, no   icterus, no pallor, corneas clear   Ears:    Ears appear intact with no abnormalities noted    Throat:   No oral lesions, no thrush, oral mucosa moist   Neck:   No adenopathy, supple, trachea midline, no thyromegaly, no   carotid bruit, no JVD   Back:     No kyphosis present, no scoliosis present, no skin lesions, erythema or scars, no tenderness to  palpation, range of motion normal   Lungs:     Clear to auscultation,respirations regular, even and unlabored    Heart:    irregular rhythm and normal rate, normal S1 and S2, no murmur, no gallop, no rub, no click   Chest Wall:    No abnormalities observed   Abdomen:     Normal bowel sounds, no masses, no organomegaly, soft, nontender, nondistended, no guarding, no rebound  tenderness   Extremities:   Moves all extremities well, no edema, no cyanosis, no redness   Pulses:   Pulses palpable and equal bilaterally. Normal radial, carotid, dorsalis pedis and posterior tibial pulses bilaterally.    Skin:  Psychiatric:   No bleeding, bruising or rash    Alert and oriented x 3, normal mood and affect                 Lab Review:      Results from last 7 days   Lab Units 04/14/23  0627 04/12/23  0840 04/11/23  0959   SODIUM mmol/L 141   < > 141   POTASSIUM mmol/L 4.5   < > 5.1   CHLORIDE mmol/L 107   < > 102   TOTAL CO2 mmol/L  --   --  29.0   CO2 mmol/L 23.7   < >  --    BUN mg/dL 19   < > 17   CREATININE mg/dL 1.01*   < > 1.18*   CALCIUM mg/dL 9.8   < > 10.2   BILIRUBIN mg/dL  --   --  0.6   ALK PHOS U/L  --   --  60   ALT (SGPT) U/L  --   --  32   AST (SGOT) U/L  --   --  22   GLUCOSE mg/dL 114*   < > 116*    < > = values in this interval not displayed.         @[id number]@  Results from last 7 days   Lab Units 04/12/23  0840   WBC 10*3/mm3 6.55   HEMOGLOBIN g/dL 13.3   HEMATOCRIT % 40.9   PLATELETS 10*3/mm3 336             I personally viewed and interpreted the patient's EKG/Telemetry data  )  * No active hospital problems. *    Assessment and Plan:    1. Persistent atrial fibrillation, on Xarelto 20 mg daily, on diltiazem and Toprol-XL.  2.  Hypertension. Controlled.   3. Hyperlipidemia.  Could not tolerate atorvastatin or rosuvastatin.  4. Obstructive sleep apnea. On CPAP.   5. Recurrent right ear infections.   6. Obesity and sedentary lifestyle.   7. Family history of coronary artery of coronary artery disease in her mother.   8.  Diabetes mellitus type 2.    CV today.     Mayte Vu MD  04/14/23  07:15 EDT.  Time spent in reviewing chart, discussion and examination:

## 2023-04-14 NOTE — ANESTHESIA POSTPROCEDURE EVALUATION
Patient: Claudia Gage    Procedure Summary     Date: 04/14/23 Room / Location: [facility] PACU    Anesthesia Start: 0735 Anesthesia Stop: 0743    Procedure: CARDIOVERSION EXTERNAL IN CARDIOLOGY DEPARTMENT Diagnosis:       Longstanding persistent atrial fibrillation      (symptomatic atrial fibrillation)    Scheduled Providers: Mayte Vu MD Provider:     Anesthesia Type: general ASA Status: 3          Anesthesia Type: general    Vitals  Vitals Value Taken Time   /68 04/14/23 0801   Temp     Pulse 62 04/14/23 0806   Resp 20 04/14/23 0755   SpO2 96 % 04/14/23 0806   Vitals shown include unvalidated device data.        Post Anesthesia Care and Evaluation    Patient location during evaluation: bedside  Patient participation: complete - patient participated  Level of consciousness: responsive to light touch, responsive to verbal stimuli and sleepy but conscious  Pain score: 0  Pain management: adequate    Airway patency: patent  Anesthetic complications: No anesthetic complications  PONV Status: none  Cardiovascular status: acceptable and hemodynamically stable  Respiratory status: acceptable  Hydration status: acceptable

## 2023-04-14 NOTE — PROCEDURES
Patient came to the PACU after an overnight fast.  Informed written consent was obtained.  She underwent moderate conscious sedation with propofol per anesthesia.  She then underwent biphasic cardioversion, 200 J and converted from atrial fibrillation back into normal sinus rhythm after 1 attempt.    She tolerated procedure well and will go home later today.    Spoke with  Chris.

## 2023-04-18 RX ORDER — METOPROLOL SUCCINATE 200 MG/1
TABLET, EXTENDED RELEASE ORAL
Qty: 90 TABLET | Refills: 1 | Status: SHIPPED | OUTPATIENT
Start: 2023-04-18

## 2023-05-02 ENCOUNTER — OFFICE VISIT (OUTPATIENT)
Dept: INTERNAL MEDICINE | Facility: CLINIC | Age: 63
End: 2023-05-02
Payer: COMMERCIAL

## 2023-05-02 VITALS
TEMPERATURE: 96.7 F | WEIGHT: 231.4 LBS | SYSTOLIC BLOOD PRESSURE: 118 MMHG | DIASTOLIC BLOOD PRESSURE: 82 MMHG | OXYGEN SATURATION: 97 % | BODY MASS INDEX: 36.24 KG/M2 | HEART RATE: 82 BPM

## 2023-05-02 DIAGNOSIS — I10 PRIMARY HYPERTENSION: Primary | ICD-10-CM

## 2023-05-02 DIAGNOSIS — E11.9 TYPE 2 DIABETES MELLITUS WITHOUT COMPLICATION, WITHOUT LONG-TERM CURRENT USE OF INSULIN: ICD-10-CM

## 2023-05-02 DIAGNOSIS — I48.11 LONGSTANDING PERSISTENT ATRIAL FIBRILLATION: ICD-10-CM

## 2023-05-02 DIAGNOSIS — E78.2 MIXED HYPERLIPIDEMIA: ICD-10-CM

## 2023-05-02 NOTE — PROGRESS NOTES
Subjective   Claudia Gage is a 62 y.o. female presenting today for follow up of   Chief Complaint   Patient presents with   • Follow-up     Blood work   • Hypertension   • Hyperlipidemia   • Diabetes       History of Present Illness     Outpatient Medications Marked as Taking for the 5/2/23 encounter (Office Visit) with Darlin Kirkland APRN   Medication Sig Dispense Refill   • amiodarone (PACERONE) 200 MG tablet Take 1 tablet by mouth Daily. 30 tablet 5   • clobetasol (TEMOVATE) 0.05 % cream Apply  topically to the appropriate area as directed 2 (Two) Times a Day. 60 g 1   • dilTIAZem CD (CARDIZEM CD) 240 MG 24 hr capsule Take 1 capsule by mouth Every Night. 30 capsule 11   • ezetimibe (ZETIA) 10 MG tablet Take 1 tablet by mouth Daily. 90 tablet 3   • fexofenadine (ALLEGRA) 60 MG tablet Take 1 tablet by mouth Daily.     • fluticasone (FLONASE) 50 MCG/ACT nasal spray 2 sprays into each nostril Daily. 1 each 11   • metFORMIN (GLUCOPHAGE) 500 MG tablet Take 2 tablets by mouth 2 (Two) Times a Day. 360 tablet 1   • metoprolol succinate XL (TOPROL-XL) 200 MG 24 hr tablet TAKE 1 TABLET BY MOUTH EVERYDAY AT BEDTIME 90 tablet 1   • Probiotic Product (PROBIOTIC-10 PO) Take  by mouth.     • valsartan-hydrochlorothiazide (DIOVAN-HCT) 160-12.5 MG per tablet TAKE 1 TABLET BY MOUTH EVERY DAY 90 tablet 2   • vitamin C (ASCORBIC ACID) 250 MG tablet Take 1 tablet by mouth Daily.     • vitamin D (ERGOCALCIFEROL) 1.25 MG (46452 UT) capsule capsule TAKE 1 CAPSULE BY MOUTH ONE TIME PER WEEK 12 capsule 4   • Xarelto 20 MG tablet TAKE 1 TABLET BY MOUTH EVERY DAY 90 tablet 2   • Zinc 50 MG tablet Take  by mouth.         Presents for 6mo f/u r/t afib, HTN, HLD, and DM. She had myalgias w/ several different statins. Cardiology changed her to Zetia. She has not had an eye exam.      The following portions of the patient's history were reviewed and updated as appropriate: allergies, current medications, past family history, past medical  history, past social history, past surgical history and problem list.    Review of Systems   Respiratory: Negative for cough and shortness of breath.    Cardiovascular: Negative for chest pain and palpitations.   Neurological: Negative for dizziness and headache.       Objective   Vitals:    05/02/23 1244   BP: 118/82   BP Location: Left arm   Patient Position: Sitting   Cuff Size: Adult   Pulse: 82   Temp: 96.7 °F (35.9 °C)   TempSrc: Temporal   SpO2: 97%   Weight: 105 kg (231 lb 6.4 oz)       BP Readings from Last 3 Encounters:   05/02/23 118/82   04/14/23 121/90   04/14/23 135/85        Wt Readings from Last 3 Encounters:   05/02/23 105 kg (231 lb 6.4 oz)   04/11/23 104 kg (229 lb)   01/20/23 103 kg (228 lb)        Body mass index is 36.24 kg/m².  Nursing notes and vitals reviewed.    Physical Exam  Constitutional:       General: She is not in acute distress.     Appearance: She is well-developed.   Neck:      Thyroid: No thyroid mass or thyromegaly.   Cardiovascular:      Rate and Rhythm: Regular rhythm.      Heart sounds: S1 normal and S2 normal.   Pulmonary:      Effort: Pulmonary effort is normal.      Breath sounds: Normal breath sounds.   Musculoskeletal:      Cervical back: Neck supple.   Lymphadenopathy:      Cervical: No cervical adenopathy.   Neurological:      Mental Status: She is alert and oriented to person, place, and time.   Psychiatric:         Attention and Perception: She is attentive.         Behavior: Behavior normal.         Thought Content: Thought content normal.         Recent Results (from the past 672 hour(s))   Comprehensive Metabolic Panel    Collection Time: 04/11/23  9:59 AM    Specimen: Blood   Result Value Ref Range    Glucose 116 (H) 65 - 99 mg/dL    BUN 17 8 - 23 mg/dL    Creatinine 1.18 (H) 0.57 - 1.00 mg/dL    EGFR Result 52.3 (L) >60.0 mL/min/1.73    BUN/Creatinine Ratio 14.4 7.0 - 25.0    Sodium 141 136 - 145 mmol/L    Potassium 5.1 3.5 - 5.2 mmol/L    Chloride 102 98 - 107  mmol/L    Total CO2 29.0 22.0 - 29.0 mmol/L    Calcium 10.2 8.6 - 10.5 mg/dL    Total Protein 7.2 6.0 - 8.5 g/dL    Albumin 4.7 3.5 - 5.2 g/dL    Globulin 2.5 gm/dL    A/G Ratio 1.9 g/dL    Total Bilirubin 0.6 0.0 - 1.2 mg/dL    Alkaline Phosphatase 60 39 - 117 U/L    AST (SGOT) 22 1 - 32 U/L    ALT (SGPT) 32 1 - 33 U/L   Hemoglobin A1c    Collection Time: 04/11/23  9:59 AM    Specimen: Blood   Result Value Ref Range    Hemoglobin A1C 6.40 (H) 4.80 - 5.60 %   Lipid Panel With / Chol / HDL Ratio    Collection Time: 04/11/23  9:59 AM    Specimen: Blood   Result Value Ref Range    Total Cholesterol 240 (H) 0 - 200 mg/dL    Triglycerides 157 (H) 0 - 150 mg/dL    HDL Cholesterol 58 40 - 60 mg/dL    VLDL Cholesterol Jean 28 5 - 40 mg/dL    LDL Chol Calc (NIH) 154 (H) 0 - 100 mg/dL    Chol/HDL Ratio 4.14    Basic Metabolic Panel    Collection Time: 04/12/23  8:40 AM    Specimen: Blood   Result Value Ref Range    Glucose 127 (H) 65 - 99 mg/dL    BUN 20 8 - 23 mg/dL    Creatinine 1.13 (H) 0.57 - 1.00 mg/dL    Sodium 140 136 - 145 mmol/L    Potassium 5.1 3.5 - 5.2 mmol/L    Chloride 104 98 - 107 mmol/L    CO2 26.0 22.0 - 29.0 mmol/L    Calcium 9.2 8.6 - 10.5 mg/dL    BUN/Creatinine Ratio 17.7 7.0 - 25.0    Anion Gap 10.0 5.0 - 15.0 mmol/L    eGFR 55.1 (L) >60.0 mL/min/1.73   CBC Auto Differential    Collection Time: 04/12/23  8:40 AM    Specimen: Blood   Result Value Ref Range    WBC 6.55 3.40 - 10.80 10*3/mm3    RBC 4.82 3.77 - 5.28 10*6/mm3    Hemoglobin 13.3 12.0 - 15.9 g/dL    Hematocrit 40.9 34.0 - 46.6 %    MCV 84.9 79.0 - 97.0 fL    MCH 27.6 26.6 - 33.0 pg    MCHC 32.5 31.5 - 35.7 g/dL    RDW 12.5 12.3 - 15.4 %    RDW-SD 39.2 37.0 - 54.0 fl    MPV 9.7 6.0 - 12.0 fL    Platelets 336 140 - 450 10*3/mm3    Neutrophil % 47.5 42.7 - 76.0 %    Lymphocyte % 36.0 19.6 - 45.3 %    Monocyte % 11.6 5.0 - 12.0 %    Eosinophil % 3.8 0.3 - 6.2 %    Basophil % 0.9 0.0 - 1.5 %    Immature Grans % 0.2 0.0 - 0.5 %    Neutrophils,  Absolute 3.11 1.70 - 7.00 10*3/mm3    Lymphocytes, Absolute 2.36 0.70 - 3.10 10*3/mm3    Monocytes, Absolute 0.76 0.10 - 0.90 10*3/mm3    Eosinophils, Absolute 0.25 0.00 - 0.40 10*3/mm3    Basophils, Absolute 0.06 0.00 - 0.20 10*3/mm3    Immature Grans, Absolute 0.01 0.00 - 0.05 10*3/mm3    nRBC 0.0 0.0 - 0.2 /100 WBC   Basic Metabolic Panel    Collection Time: 04/14/23  6:27 AM    Specimen: Hand, Right; Blood   Result Value Ref Range    Glucose 114 (H) 65 - 99 mg/dL    BUN 19 8 - 23 mg/dL    Creatinine 1.01 (H) 0.57 - 1.00 mg/dL    Sodium 141 136 - 145 mmol/L    Potassium 4.5 3.5 - 5.2 mmol/L    Chloride 107 98 - 107 mmol/L    CO2 23.7 22.0 - 29.0 mmol/L    Calcium 9.8 8.6 - 10.5 mg/dL    BUN/Creatinine Ratio 18.8 7.0 - 25.0    Anion Gap 10.3 5.0 - 15.0 mmol/L    eGFR 63.1 >60.0 mL/min/1.73   ECG 12 Lead Other; pre cardioversion    Collection Time: 04/14/23  6:31 AM   Result Value Ref Range    QT Interval 431 ms   ECG 12 Lead Rhythm Change    Collection Time: 04/14/23  7:52 AM   Result Value Ref Range    QT Interval 457 ms         Assessment & Plan   Diagnoses and all orders for this visit:    1. Primary hypertension (Primary)  -     CBC (No Diff); Future  -     Comprehensive Metabolic Panel; Future    2. Mixed hyperlipidemia  -     Comprehensive Metabolic Panel; Future  -     Lipid Panel With / Chol / HDL Ratio; Future    3. Longstanding persistent atrial fibrillation (HCC)    4. Type 2 diabetes mellitus without complication, without long-term current use of insulin  -     Comprehensive Metabolic Panel; Future  -     Hemoglobin A1c; Future  -     TSH; Future  -     Urinalysis without microscopic (no culture) - Urine, Clean Catch; Future  -     Microalbumin / Creatinine Urine Ratio - Urine, Clean Catch; Future        1. Controlled, no change    2. Uncontrolled. Restart low-dose pravastatin. Cont Zetia.    3. Cont Xarelto.    4. Controlled, no med change.  Schedule eye exam.  Patient counseled regarding therapeutic  lifestyle changes including improved nutrition, increased exercise, and weight loss.        Medications, including side effects, were discussed with the patient. Patient verbalized understanding.  The plan of care was discussed. All questions were answered. Patient verbalized understanding.      Return in about 6 months (around 11/2/2023) for fasting labs one week prior to, Annual physical.

## 2023-05-04 DIAGNOSIS — E11.9 TYPE 2 DIABETES MELLITUS WITHOUT COMPLICATION, WITHOUT LONG-TERM CURRENT USE OF INSULIN: ICD-10-CM

## 2023-05-04 NOTE — TELEPHONE ENCOUNTER
Rx Refill Note  Requested Prescriptions     Pending Prescriptions Disp Refills   • metFORMIN (GLUCOPHAGE) 500 MG tablet 360 tablet 1     Sig: Take 2 tablets by mouth 2 (Two) Times a Day.      Last office visit with prescribing clinician: 5/2/2023   Last telemedicine visit with prescribing clinician: 10/31/2023   Next office visit with prescribing clinician: 11/7/2023                         Would you like a call back once the refill request has been completed: [] Yes [] No    If the office needs to give you a call back, can they leave a voicemail: [] Yes [] No    Claudine Wynne MA  05/04/23, 10:45 EDT

## 2023-06-15 ENCOUNTER — TELEPHONE (OUTPATIENT)
Dept: INTERNAL MEDICINE | Facility: CLINIC | Age: 63
End: 2023-06-15
Payer: COMMERCIAL

## 2023-06-15 RX ORDER — PEPPERMINT OIL 90 MG
2 CAPSULE, DELAYED, AND EXTENDED RELEASE ORAL 3 TIMES DAILY PRN
Qty: 180 CAPSULE | Refills: 11 | Status: SHIPPED | OUTPATIENT
Start: 2023-06-15

## 2023-06-15 NOTE — TELEPHONE ENCOUNTER
I called and spoke with patient and informed her of this, she said that the pharmacy ran a test claim through and it was covered, would you be okay with sending it in for her?

## 2023-06-15 NOTE — TELEPHONE ENCOUNTER
Received a fax from [facility] on [address] requesting a prescription for IBGARD 90 mg capsule. The fax states that the patient is requesting a prescription for this and is requesting a prescription due to the cost. Would you be okay with sending this in for her?

## 2023-06-16 ENCOUNTER — OFFICE VISIT (OUTPATIENT)
Dept: CARDIOLOGY | Facility: CLINIC | Age: 63
End: 2023-06-16
Payer: COMMERCIAL

## 2023-06-16 VITALS
RESPIRATION RATE: 16 BRPM | BODY MASS INDEX: 35.31 KG/M2 | WEIGHT: 225 LBS | OXYGEN SATURATION: 99 % | HEIGHT: 67 IN | DIASTOLIC BLOOD PRESSURE: 90 MMHG | HEART RATE: 86 BPM | SYSTOLIC BLOOD PRESSURE: 150 MMHG

## 2023-06-16 DIAGNOSIS — E78.2 MIXED HYPERLIPIDEMIA: ICD-10-CM

## 2023-06-16 DIAGNOSIS — I48.11 LONGSTANDING PERSISTENT ATRIAL FIBRILLATION: Primary | ICD-10-CM

## 2023-06-16 DIAGNOSIS — I10 PRIMARY HYPERTENSION: ICD-10-CM

## 2023-06-16 DIAGNOSIS — E11.9 TYPE 2 DIABETES MELLITUS WITHOUT COMPLICATION, WITHOUT LONG-TERM CURRENT USE OF INSULIN: ICD-10-CM

## 2023-06-16 RX ORDER — PRAVASTATIN SODIUM 20 MG
20 TABLET ORAL NIGHTLY
Qty: 90 TABLET | Refills: 3 | Status: SHIPPED | OUTPATIENT
Start: 2023-06-16

## 2023-06-16 RX ORDER — VALSARTAN 320 MG/1
320 TABLET ORAL DAILY
Qty: 90 TABLET | Refills: 3 | Status: SHIPPED | OUTPATIENT
Start: 2023-06-16

## 2023-06-16 NOTE — PROGRESS NOTES
Date of Office Visit: 2023  Encounter Provider: Mayte Vu MD  Place of Service: [facility] CARDIOLOGY  Patient Name: Claudia Gage  :1960      Patient ID:  Claudia Gage is a 62 y.o. female is here for  followup for atrial fibrillation.        History of Present Illness    She has a history of hypertension, hyperlipidemia, atrial fibrillation, obstructive sleep apnea for which she uses a CPAP. She has recurrent ear infections in the right ear.  She has a left thyroid nodule.      She does not smoke. She is  and works part-time and has four children who are grown. She has alcohol about once or twice per year. 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       Her mother  with coronary artery disease and she had hypertension and diabetes mellitus.      echocardiogram done 3/10/2020 showed ejection fraction 69%, normal diastolic function, normal cardiac chamber sizes, aortic valve calcification but no significant insufficiency or stenosis.       Labs on 2022 show glucose 108, otherwise normal CMP, total cholesterol 246, HDL 42, , VLDL 35, triglycerides 190.  She had a cardioversion done 2022 and went back into normal sinus rhythm but is back in atrial fibrillation today.     She just got back from an [email or web address].  She and her family went.  She went back into atrial fibrillation.  We did cardiovert her on 2023 and she went back into normal sinus rhythm but today she is back in atrial fibrillation and she noted that she felt like she was going in and out a couple weeks ago.  She is now on low-dose amiodarone.  She missed 1 dose of Xarelto last night.  She has had no dizziness or syncope.  She had some mild lower extremity edema and her blood pressures been a little bit high.  She uses no salt.  Her energy levels been stable.  They just got back from a road trip last night where she went to [place] in [place].  They  leave on  to drive to [place] and will be gone until .  They are going to see baseball games.  She does not feel her heart pounding but can tell to regular.  She has no chest pain or pressure no difficulty breathing.  She has no orthopnea or PND.  She has no fevers, chills or cough.  Labs on 2023 showed normal BMP, normal CBC.  Lipids on 2023 showed total cholesterol 240, HR 58, LDL 54, triglycerides 157, VLDL 28, hemoglobin A1c 6.4%.    Past Medical History:   Diagnosis Date   • Atrial fibrillation    • Chronic rhinitis 10/15/2019   • Hyperlipidemia    • Hypertension    • Left thyroid nodule 2016   • Sleep apnea    • Type 2 diabetes mellitus without complication, without long-term current use of insulin 2019         Past Surgical History:   Procedure Laterality Date   • APPENDECTOMY     •  SECTION  1998   • COLONOSCOPY  2004    [facility]    • COLONOSCOPY W/ POLYPECTOMY N/A 2021    Procedure: COLONOSCOPY WITH POLYPECTOMY;  Surgeon: Lawrence Toussaint MD;  Location: [facility];  Service: Gastroenterology;  Laterality: N/A;  cecal polyp (cold snare)  descending colon polyp  sigmoid colon polyp  rectal polyps x2  diverticulosis       Current Outpatient Medications on File Prior to Visit   Medication Sig Dispense Refill   • amiodarone (PACERONE) 200 MG tablet Take 1 tablet by mouth Daily. 30 tablet 5   • clobetasol (TEMOVATE) 0.05 % cream Apply  topically to the appropriate area as directed 2 (Two) Times a Day. 60 g 1   • dilTIAZem CD (CARDIZEM CD) 240 MG 24 hr capsule Take 1 capsule by mouth Every Night. 30 capsule 11   • ezetimibe (ZETIA) 10 MG tablet Take 1 tablet by mouth Daily. 90 tablet 3   • fexofenadine (ALLEGRA) 60 MG tablet Take 1 tablet by mouth Daily.     • fluticasone (FLONASE) 50 MCG/ACT nasal spray 2 sprays into each nostril Daily. 1 each 11   • metFORMIN (GLUCOPHAGE) 500 MG tablet Take 2 tablets by mouth 2 (Two)  "Times a Day. 360 tablet 1   • metoprolol succinate XL (TOPROL-XL) 200 MG 24 hr tablet TAKE 1 TABLET BY MOUTH EVERYDAY AT BEDTIME 90 tablet 1   • Peppermint Oil (IBgard) 90 MG capsule controlled-release Take 2 capsules by mouth 3 (Three) Times a Day As Needed (30-90 minutes before your meal w/ water). 180 capsule 11   • Probiotic Product (PROBIOTIC-10 PO) Take  by mouth.     • valsartan-hydrochlorothiazide (DIOVAN-HCT) 160-12.5 MG per tablet TAKE 1 TABLET BY MOUTH EVERY DAY 90 tablet 2   • vitamin C (ASCORBIC ACID) 250 MG tablet Take 1 tablet by mouth Daily.     • vitamin D (ERGOCALCIFEROL) 1.25 MG (79421 UT) capsule capsule TAKE 1 CAPSULE BY MOUTH ONE TIME PER WEEK 12 capsule 4   • Xarelto 20 MG tablet TAKE 1 TABLET BY MOUTH EVERY DAY 90 tablet 2   • Zinc 50 MG tablet Take  by mouth.       No current facility-administered medications on file prior to visit.       Social History     Socioeconomic History   • Marital status:    Tobacco Use   • Smoking status: Never   • Smokeless tobacco: Never   • Tobacco comments:     Rare caffiene   Substance and Sexual Activity   • Alcohol use: Yes     Comment: socially   • Drug use: No   • Sexual activity: Never           ROS    Procedures    ECG 12 Lead    Date/Time: 6/16/2023 10:08 AM  Performed by: Mayte Vu MD  Authorized by: Mayte Vu MD   Comparison: compared with previous ECG   Comparison to previous ECG: Now a fib  Rhythm: atrial fibrillation  T flattening: all    Clinical impression: abnormal EKG                Objective:      Vitals:    06/16/23 1007   BP: 150/90   Pulse: 86   Resp: 16   SpO2: 99%   Weight: 102 kg (225 lb)   Height: 170.2 cm (67\")     Body mass index is 35.24 kg/m².    Vitals reviewed.   Constitutional:       General: Not in acute distress.     Appearance: Well-developed. Not diaphoretic.   Eyes:      General: No scleral icterus.     Conjunctiva/sclera: Conjunctivae normal.   HENT:      Head: Normocephalic and atraumatic. "   Neck:      Thyroid: No thyromegaly.      Vascular: No carotid bruit or JVD.      Lymphadenopathy: No cervical adenopathy.   Pulmonary:      Effort: Pulmonary effort is normal. No respiratory distress.      Breath sounds: Normal breath sounds. No wheezing. No rhonchi. No rales.   Chest:      Chest wall: Not tender to palpatation.   Cardiovascular:      Normal rate. Irregularly irregular rhythm.      Murmurs: There is no murmur.      No gallop.   Pulses:     Intact distal pulses.   Edema:     Peripheral edema absent.   Abdominal:      General: Bowel sounds are normal. There is no distension or abdominal bruit.      Palpations: Abdomen is soft. There is no abdominal mass.      Tenderness: There is no abdominal tenderness.   Musculoskeletal:         General: No deformity.      Extremities: No clubbing present.     Cervical back: Neck supple. Skin:     General: Skin is warm and dry. There is no cyanosis.      Coloration: Skin is not pale.      Findings: No rash.   Neurological:      Mental Status: Alert and oriented to person, place, and time.      Cranial Nerves: No cranial nerve deficit.   Psychiatric:         Judgment: Judgment normal.         Lab Review:       Assessment:      Diagnosis Plan   1. Longstanding persistent atrial fibrillation        2. Primary hypertension        3. Mixed hyperlipidemia        4. Type 2 diabetes mellitus without complication, without long-term current use of insulin             1. Paroxysmal atrial fibrillation, on Xarelto 20 mg daily, on diltiazem, amiodarone and Toprol-XL.  May need cardioversion after her next visit if she still has atrial fibrillation.  Remain on amiodarone at this point.  2. Hypertension.  Blood pressure is high.  Advised no salt, hydration with water, stop HCTZ in favor of increasing valsartan to 320 mg daily.  Remain on all other medications.  3. Hyperlipidemia.  Could not tolerate atorvastatin or rosuvastatin.  Is tolerating pravastatin 20 mg daily with Zetia.   She has some mild myalgias but they are tolerable.  4. Obstructive sleep apnea. On CPAP.   5. Recurrent right ear infections.   6. Obesity and sedentary lifestyle.   7. Family history of coronary artery of coronary artery disease in her mother.   8. Diabetes mellitus type 2.       Plan:       See Lita at the beginning of August, no other testing at this time.  She may need to cardioversion again after that if she still in atrial fibrillation.

## 2023-07-11 NOTE — ANESTHESIA PREPROCEDURE EVALUATION
Anesthesia Evaluation     Patient summary reviewed and Nursing notes reviewed   no history of anesthetic complications:               Airway   Mallampati: II  TM distance: >3 FB  Dental - normal exam     Pulmonary    (+) sleep apnea,   Cardiovascular   Exercise tolerance: good (4-7 METS)    (+) hypertension, dysrhythmias Atrial Fib, hyperlipidemia,       Neuro/Psych- negative ROS  GI/Hepatic/Renal/Endo    (+) obesity,   diabetes mellitus, thyroid problem thyroid nodules    Musculoskeletal     Abdominal    Substance History      OB/GYN          Other   arthritis,                      Anesthesia Plan    ASA 3     MAC   total IV anesthesia  (I have reviewed the patient's history with the patient and the chart, including all pertinent laboratory results and imaging. I have explained the risks of anesthesia including but not limited to dental damage, corneal abrasion, nerve injury, MI, stroke, and death. Questions asked and answered. Anesthetic plan discussed with patient and team as indicated. Patient expressed understanding of the above.  )    Anesthetic plan, risks, benefits, and alternatives have been provided, discussed and informed consent has been obtained with: patient.    Plan discussed with CRNA.        CODE STATUS:        Ivermectin Pregnancy And Lactation Text: This medication is Pregnancy Category C and it isn't known if it is safe during pregnancy. It is also excreted in breast milk.

## 2023-08-04 ENCOUNTER — OFFICE VISIT (OUTPATIENT)
Dept: CARDIOLOGY | Facility: CLINIC | Age: 63
End: 2023-08-04
Payer: COMMERCIAL

## 2023-08-04 VITALS
OXYGEN SATURATION: 97 % | WEIGHT: 231 LBS | BODY MASS INDEX: 36.26 KG/M2 | HEIGHT: 67 IN | DIASTOLIC BLOOD PRESSURE: 80 MMHG | HEART RATE: 68 BPM | RESPIRATION RATE: 16 BRPM | SYSTOLIC BLOOD PRESSURE: 120 MMHG

## 2023-08-04 DIAGNOSIS — E11.9 TYPE 2 DIABETES MELLITUS WITHOUT COMPLICATION, WITHOUT LONG-TERM CURRENT USE OF INSULIN: ICD-10-CM

## 2023-08-04 DIAGNOSIS — I48.11 LONGSTANDING PERSISTENT ATRIAL FIBRILLATION: ICD-10-CM

## 2023-08-04 DIAGNOSIS — E04.1 LEFT THYROID NODULE: ICD-10-CM

## 2023-08-04 DIAGNOSIS — G47.33 OBSTRUCTIVE SLEEP APNEA SYNDROME: ICD-10-CM

## 2023-08-04 DIAGNOSIS — I48.92 ATRIAL FLUTTER WITH CONTROLLED RESPONSE: Primary | ICD-10-CM

## 2023-08-04 DIAGNOSIS — I10 PRIMARY HYPERTENSION: ICD-10-CM

## 2023-08-04 DIAGNOSIS — E78.2 MIXED HYPERLIPIDEMIA: ICD-10-CM

## 2023-09-06 DIAGNOSIS — E11.9 TYPE 2 DIABETES MELLITUS WITHOUT COMPLICATION, WITHOUT LONG-TERM CURRENT USE OF INSULIN: ICD-10-CM

## 2023-09-07 ENCOUNTER — TELEPHONE (OUTPATIENT)
Dept: CARDIOLOGY | Facility: CLINIC | Age: 63
End: 2023-09-07
Payer: COMMERCIAL

## 2023-09-07 RX ORDER — ERGOCALCIFEROL 1.25 MG/1
CAPSULE ORAL
Qty: 12 CAPSULE | Refills: 3 | Status: SHIPPED | OUTPATIENT
Start: 2023-09-07

## 2023-09-07 RX ORDER — AMIODARONE HYDROCHLORIDE 200 MG/1
TABLET ORAL
Qty: 90 TABLET | Refills: 0 | Status: SHIPPED | OUTPATIENT
Start: 2023-09-07

## 2023-09-07 RX ORDER — RIVAROXABAN 20 MG/1
TABLET, FILM COATED ORAL
Qty: 90 TABLET | Refills: 1 | Status: SHIPPED | OUTPATIENT
Start: 2023-09-07

## 2023-09-07 NOTE — TELEPHONE ENCOUNTER
Lita saw patient at the beginning of August.  She was setting her up for a cardioversion and I am not sure that this has been scheduled.  Can you please check on this and see what is going on.  Thank you

## 2023-09-07 NOTE — TELEPHONE ENCOUNTER
PT should still have medication    Rx Refill Note  Requested Prescriptions     Pending Prescriptions Disp Refills    metFORMIN (GLUCOPHAGE) 500 MG tablet [Pharmacy Med Name: METFORMIN  MG TABLET] 360 tablet 1     Sig: TAKE 2 TABLETS BY MOUTH TWICE A DAY    vitamin D (ERGOCALCIFEROL) 1.25 MG (77975 UT) capsule capsule [Pharmacy Med Name: VITAMIN D2 1.25MG(50,000 UNIT)] 12 capsule 1     Sig: TAKE 1 CAPSULE BY MOUTH ONE TIME PER WEEK      Last office visit with prescribing clinician: 5/2/2023   Last telemedicine visit with prescribing clinician: Visit date not found   Next office visit with prescribing clinician: 11/7/2023                         Would you like a call back once the refill request has been completed: [] Yes [] No    If the office needs to give you a call back, can they leave a voicemail: [] Yes [] No    Bud Salazar, PCT  09/07/23, 08:24 EDT

## 2023-10-09 NOTE — DISCHARGE INSTRUCTIONS
Electrical Cardioversion  Electrical cardioversion is the delivery of a jolt of electricity to restore a normal rhythm to the heart. A rhythm that is too fast or is not regular keeps the heart from pumping well. In this procedure, sticky patches or metal paddles are placed on the chest to deliver electricity to the heart from a device.  This procedure may be done in an emergency if:  · There is low or no blood pressure as a result of the heart rhythm.  · Normal rhythm must be restored as fast as possible to protect the brain and heart from further damage.  · It may save a life.  This may also be a scheduled procedure for irregular or fast heart rhythms that are not immediately life-threatening.  What are the risks?  Generally, this is a safe procedure. However, problems may occur, including:  · Allergic reactions to medicines.  · A blood clot that breaks free and travels to other parts of your body (a stroke).    · The possible return of an abnormal heart rhythm within hours or days after the procedure.  · Your heart stopping (cardiac arrest). This is rare.  .  Follow these instructions at home:  · Do not drive for 24 hours if you were given a sedative during your procedure.  · Take over-the-counter and prescription medicines only as told by your health care provider.  · Ask your health care provider how to check your pulse. Check it often.  · Rest for 24 hours after the procedure or as told by your health care provider.  · Avoid or limit your caffeine use as told by your health care provider.  · Keep all follow-up visits as told by your health care provider. This is important.  Contact a health care provider if:  · You feel like your heart is beating too quickly or your pulse is not regular.  · You have a serious muscle cramp that does not go away.  Get help right away if:  · You have discomfort in your chest.  · You are dizzy or you feel faint.  · You have trouble breathing or you are short of breath.  · Your  speech is slurred.  · You have trouble moving an arm or leg on one side of your body.  · Your fingers or toes turn cold or blue.      Call 911 if:     · You have any symptoms of a stroke.  Remember BE FAST  · B-balance. Sudden trouble walking or loss of balance.  · E-eyes.  Sudden changes in how you see or a sudden onset of a very bad headache.   · F-face. Sudden weakness or loss of feeling of the face or facial droop on one side.   · A-arms Sudden weakness or numbness in one arm.  One arm drifts down if they are both held out in front of you. This happens suddenly and usually on one side of the body.  · S-speech.  Sudden trouble speaking, slurred speech or trouble understanding what people are saying.   · T-time Time to call emergency services.  Write down the symptoms and the time they started.       This information is not intended to replace advice given to you by your health care provider. Make sure you discuss any questions you have with your health care provider.                   Concentration Of Solution Injected (Mg/Ml): 10.0

## 2023-10-13 ENCOUNTER — TELEPHONE (OUTPATIENT)
Dept: CARDIOLOGY | Facility: CLINIC | Age: 63
End: 2023-10-13

## 2023-10-13 NOTE — TELEPHONE ENCOUNTER
Caller: Claudia Gage    Relationship: Self    Best call back number: [phone]     What is the best time to reach you: ANYTIME    Who are you requesting to speak with (clinical staff, provider,  specific staff member): ANYONE    What was the call regarding: PATIENT NEEDS TO CANCEL HER CARDIOVERSION ON 10.18.23    Is it okay if the provider responds through MyChart: NO

## 2023-10-18 DIAGNOSIS — E11.9 TYPE 2 DIABETES MELLITUS WITHOUT COMPLICATION, WITHOUT LONG-TERM CURRENT USE OF INSULIN: ICD-10-CM

## 2023-10-18 DIAGNOSIS — I10 PRIMARY HYPERTENSION: ICD-10-CM

## 2023-10-18 DIAGNOSIS — E78.2 MIXED HYPERLIPIDEMIA: ICD-10-CM

## 2023-12-08 RX ORDER — METOPROLOL SUCCINATE 200 MG/1
200 TABLET, EXTENDED RELEASE ORAL
Qty: 90 TABLET | Refills: 1 | Status: SHIPPED | OUTPATIENT
Start: 2023-12-08

## 2023-12-08 NOTE — TELEPHONE ENCOUNTER
She needs follow-up.     Can you schedule her to see me or other NP in January 2024?    Thanks!  QUIN Greer

## 2024-01-04 RX ORDER — EZETIMIBE 10 MG/1
10 TABLET ORAL DAILY
Qty: 90 TABLET | Refills: 2 | Status: SHIPPED | OUTPATIENT
Start: 2024-01-04

## 2024-01-05 RX ORDER — AMIODARONE HYDROCHLORIDE 200 MG/1
TABLET ORAL
Qty: 90 TABLET | Refills: 0 | Status: SHIPPED | OUTPATIENT
Start: 2024-01-05

## 2024-02-02 RX ORDER — DILTIAZEM HYDROCHLORIDE 240 MG/1
240 CAPSULE, COATED, EXTENDED RELEASE ORAL NIGHTLY
Qty: 90 CAPSULE | Refills: 3
Start: 2024-02-02

## 2024-02-02 RX ORDER — EZETIMIBE 10 MG/1
10 TABLET ORAL DAILY
Qty: 90 TABLET | Refills: 3 | Status: SHIPPED | OUTPATIENT
Start: 2024-02-02

## 2024-02-26 ENCOUNTER — TELEPHONE (OUTPATIENT)
Dept: CARDIOLOGY | Facility: CLINIC | Age: 64
End: 2024-02-26
Payer: COMMERCIAL

## 2024-02-26 RX ORDER — DILTIAZEM HYDROCHLORIDE 240 MG/1
240 CAPSULE, COATED, EXTENDED RELEASE ORAL NIGHTLY
Qty: 90 CAPSULE | Refills: 2 | Status: SHIPPED | OUTPATIENT
Start: 2024-02-26

## 2024-02-26 NOTE — TELEPHONE ENCOUNTER
Patient needs a refill on Xarelto 20mp sent to [facility] She is completely out, she is coming by the office to get samples tho

## 2024-04-16 ENCOUNTER — TELEPHONE (OUTPATIENT)
Dept: INTERNAL MEDICINE | Facility: CLINIC | Age: 64
End: 2024-04-16
Payer: COMMERCIAL

## 2024-04-16 NOTE — TELEPHONE ENCOUNTER
Hub staff attempted to follow warm transfer process and was unsuccessful     Caller: Claudia Gage    Relationship to patient: Self    Best call back number: [phone]     Patient is needing: PATIENT IS NEEDING TO SCHEDULE LAB WORK     PLEASE ADVISE

## 2024-04-23 ENCOUNTER — OFFICE VISIT (OUTPATIENT)
Dept: CARDIOLOGY | Facility: CLINIC | Age: 64
End: 2024-04-23
Payer: COMMERCIAL

## 2024-04-23 VITALS
DIASTOLIC BLOOD PRESSURE: 70 MMHG | HEIGHT: 67 IN | HEART RATE: 57 BPM | SYSTOLIC BLOOD PRESSURE: 144 MMHG | WEIGHT: 231.5 LBS | BODY MASS INDEX: 36.34 KG/M2

## 2024-04-23 DIAGNOSIS — E78.2 MIXED HYPERLIPIDEMIA: ICD-10-CM

## 2024-04-23 DIAGNOSIS — I10 PRIMARY HYPERTENSION: ICD-10-CM

## 2024-04-23 DIAGNOSIS — I48.92 ATRIAL FLUTTER WITH CONTROLLED RESPONSE: Primary | ICD-10-CM

## 2024-04-23 DIAGNOSIS — G47.33 OBSTRUCTIVE SLEEP APNEA SYNDROME: ICD-10-CM

## 2024-04-23 DIAGNOSIS — I48.11 LONGSTANDING PERSISTENT ATRIAL FIBRILLATION: ICD-10-CM

## 2024-04-23 PROCEDURE — 93000 ELECTROCARDIOGRAM COMPLETE: CPT | Performed by: PHYSICIAN ASSISTANT

## 2024-04-23 PROCEDURE — 99214 OFFICE O/P EST MOD 30 MIN: CPT | Performed by: PHYSICIAN ASSISTANT

## 2024-04-23 NOTE — Clinical Note
She is placed on amiodarone June 2023 from getting labs and chest x-ray today.  She will be continuous this with her Cardizem? She doesn't follow up for another year

## 2024-04-23 NOTE — PROGRESS NOTES
CARDIOLOGY        Patient Name: Claudia Gage  :1960  Age: 63 y.o.  Primary Cardiologist: Mayte Vu MD  Encounter Provider:  Roverto Rose PA-C    Date of Service: 24        CHIEF COMPLAINT / REASON FOR OFFICE VISIT     1 year follow-up    HISTORY OF PRESENT ILLNESS       HPI  Claudia Gage is a 63 y.o. female who presents today for 1 year follow-up.     Pt has a  history significant for atrial flutter, atrial fibrillation, hypertension, hyperlipidemia, SIMIN, and diabetes presents for 1 year follow-up.  Patient was last seen on 2023 where she was in flutter at that appointment.  Patient had decreased edema and was tired more quickly.  Patient was set up for a cardioversion but this was not done.    Patient had changes in insurance and this is why she did not have it done.  Patient has intermittent episodes of palpitations but does not really know if she is in A-fib/.  Patient overall has been doing well since her last office visit.  Patient denies any chest pain, shortness of breath, palpitations, lightheadedness, edema to her legs, orthopnea, or any bleeding issues.  Patient does not exercise regularly.  Patient tries to watch what she eats.      Below is a summary of pertinent cardiology findings:  She is , has 4 children, and works part-time in a nursery at [employer].  She does not smoke, has alcohol once or twice a year, and does not use drugs.  Family history includes her mother who  of coronary artery disease; she also had hypertension and diabetes.  2020 echocardiogram showed EF 69%, normal diastolic function, normal chamber sizes, aortic valve calcification with 2023 out insufficiency or stenosis.    2022 she had cardioversion for A-fib and converted to sinus rhythm.  She then converted back into A-fib when she went on [employer] in 2023.  She was cardioverted on 2023 and went back into sinus rhythm.    "2023 she was seen when she was back in [facility] and started on low-dose amiodarone.  She had been started on Xarelto but had missed 1 dose.  She was continued on her medication.    The following portions of the patient's history were reviewed and updated as appropriate: allergies, current medications, past family history, past medical history, past social history, past surgical history and problem list.      VITAL SIGNS     Visit Vitals  /70 (BP Location: Right arm)   Pulse 57   Ht 170.2 cm (67\")   Wt 105 kg (231 lb 8 oz)   BMI 36.26 kg/m²       @[email or web address]  Wt Readings from Last 3 Encounters:   04/23/24 105 kg (231 lb 8 oz)   08/04/23 105 kg (231 lb)   06/16/23 102 kg (225 lb)     Body mass index is 36.26 kg/m².        PHYSICAL EXAMINATION     Constitutional:       General: Awake. Not in acute distress.     Appearance: Healthy appearance. Not in distress.   Pulmonary:      Effort: Pulmonary effort is normal.      Breath sounds: Normal breath sounds.   Cardiovascular:      Normal rate. Regular rhythm.      Murmurs: There is no murmur.   Pulses:     Intact distal pulses.   Edema:     Peripheral edema absent.   Skin:     General: Skin is warm.   Neurological:      Mental Status: Alert.   Psychiatric:         Behavior: Behavior is cooperative.           REVIEWED DATA       ECG 12 Lead    Date/Time: 4/23/2024 9:45 AM  Performed by: Roverto Rose PA-C    Authorized by: Roverto Rose PA-C  Comparison: compared with previous ECG   Similar to previous ECG  Rhythm: sinus rhythm  Rate: normal  BPM: 57  QRS axis: normal    Clinical impression: normal ECG        Cardiac Procedures:      Monitor on 12/27/2021  Interpretation Summary    An abnormal monitor study.  Atrial fibrillation is present throughout  Average heart rate 97 bpm, range  bpm  No significant pauses are seen.     Transthoracic echo on 3/10/2020  Interpretation Summary    Left ventricular systolic function is normal. Calculated " "EF = 69.0%. Estimated EF was in agreement with the calculated EF. Normal left ventricular cavity size and wall thickness noted. All left ventricular wall segments contract normally. Left ventricular diastolic function is normal.  The aortic valve is not well visualized. The aortic valve is abnormal in structure. There is calcification of the aortic valve.No significant aortic valve regurgitation is present. No aortic valve stenosis is present.       Lab Results   Component Value Date     04/14/2023     04/12/2023    K 4.5 04/14/2023    K 5.1 04/12/2023     04/14/2023     04/12/2023    CO2 23.7 04/14/2023    CO2 26.0 04/12/2023    BUN 19 04/14/2023    BUN 20 04/12/2023    CREATININE 1.01 (H) 04/14/2023    CREATININE 1.13 (H) 04/12/2023    EGFRIFNONA 62 01/27/2022    EGFRIFNONA 53 (L) 12/21/2021    EGFRIFAFRI 75 07/27/2021    EGFRIFAFRI 72 05/06/2021    GLUCOSE 114 (H) 04/14/2023    GLUCOSE 127 (H) 04/12/2023    CALCIUM 9.8 04/14/2023    CALCIUM 9.2 04/12/2023    PROTENTOTREF 7.2 04/11/2023    PROTENTOTREF 7.4 01/06/2023    ALBUMIN 4.7 04/11/2023    ALBUMIN 4.3 01/06/2023    BILITOT 0.6 04/11/2023    BILITOT 0.5 01/06/2023    AST 22 04/11/2023    AST 16 01/06/2023    ALT 32 04/11/2023    ALT 15 01/06/2023     Lab Results   Component Value Date    WBC 6.55 04/12/2023    WBC 5.76 09/30/2022    HGB 13.3 04/12/2023    HGB 13.5 09/30/2022    HCT 40.9 04/12/2023    HCT 41.6 09/30/2022    MCV 84.9 04/12/2023    MCV 87.0 09/30/2022     04/12/2023     09/30/2022     No results found for: \"PROBNP\", \"BNP\"  No results found for: \"CKTOTAL\", \"CKMB\", \"CKMBINDEX\", \"TROPONINI\", \"TROPONINT\"  Lab Results   Component Value Date    TSH 1.640 09/30/2022    TSH 1.770 08/17/2022             ASSESSMENT & PLAN     Diagnoses and all orders for this visit:    Atrial flutter:  Her heart rate is controlled on 200 mg amiodarone daily and 240 mg diltiazem daily.  She is anticoagulated with 20 mg Xarelto for " [id number] score of 3.  Patient is in normal sinus rhythm today.    Persistent atrial fibrillation: She had cardioversion for A-fib in January 2022 and eventually converted back into A-fib.  She had second cardioversion in April 2023 but then converted back into A-fib by June 2023.  Pt is in NSR.     Hypertension: Elevated in office today.  Patient understands to limit her sodium intake along with exercise.  Patient to continue monitoring    Hyperlipidemia: This is treated with Zetia and pravastatin.  Lipids not at goal when checked in April 2023.    Type 2 diabetes: This is diet controlled and hemoglobin A1c at goal in April 2023.    Obstructive sleep apnea: She is compliant with CPAP.      Patient doing well overall without any issues.  Patient feels well.  I will order labs and chest xray due to use of amiodarone use. Patient to have follow-up and 1 year with Dr. Vu.  Patient to call the office with any questions or concerns.     Return in about 1 year (around 4/23/2025) for Dr. Vu.    Future Appointments         Provider Department Center    5/21/2024 3:00 PM Darlin Kirkland APRN [facility]                MEDICATIONS         Discharge Medications            Accurate as of April 23, 2024  9:51 AM. If you have any questions, ask your nurse or doctor.                Continue These Medications        Instructions Start Date   amiodarone 200 MG tablet  Commonly known as: PACERONE   TAKE 1 TABLET BY MOUTH EVERY DAY      clobetasol propionate 0.05 % cream  Commonly known as: TEMOVATE   Topical, 2 Times Daily      dilTIAZem  MG 24 hr capsule  Commonly known as: CARDIZEM CD   240 mg, Oral, Nightly      ezetimibe 10 MG tablet  Commonly known as: ZETIA   10 mg, Oral, Daily      fexofenadine 60 MG tablet  Commonly known as: ALLEGRA   60 mg, Oral, Daily      fluticasone 50 MCG/ACT nasal spray  Commonly known as: FLONASE   2 sprays, Nasal, Daily      metFORMIN  500 MG tablet  Commonly known as: GLUCOPHAGE   TAKE 2 TABLETS BY MOUTH TWICE A DAY      metoprolol succinate  MG 24 hr tablet  Commonly known as: TOPROL-XL   200 mg, Oral, Every Night at Bedtime      pravastatin 20 MG tablet  Commonly known as: Pravachol   20 mg, Oral, Nightly      PROBIOTIC-10 PO   Oral      rivaroxaban 20 MG tablet  Commonly known as: Xarelto   20 mg, Oral, Daily      valsartan 320 MG tablet  Commonly known as: DIOVAN   320 mg, Oral, Daily      vitamin C 250 MG tablet  Commonly known as: ASCORBIC ACID   250 mg, Oral, Daily      vitamin D 1.25 MG (38623 UT) capsule capsule  Commonly known as: ERGOCALCIFEROL   TAKE 1 CAPSULE BY MOUTH ONE TIME PER WEEK      Zinc 50 MG tablet   Oral                   **Dragon Disclaimer:   Much of this encounter note is an electronic transcription/translation of spoken language to printed text. The electronic translation of spoken language may permit erroneous, or at times, nonsensical words or phrases to be inadvertently transcribed. Although I have reviewed the note for such errors, some may still exist.

## 2024-05-08 ENCOUNTER — HOSPITAL ENCOUNTER (OUTPATIENT)
Dept: GENERAL RADIOLOGY | Facility: HOSPITAL | Age: 64
Discharge: HOME | End: 2024-05-08
Payer: COMMERCIAL

## 2024-05-08 ENCOUNTER — LAB (OUTPATIENT)
Dept: LAB | Facility: HOSPITAL | Age: 64
End: 2024-05-08
Payer: COMMERCIAL

## 2024-05-08 DIAGNOSIS — E78.2 MIXED HYPERLIPIDEMIA: ICD-10-CM

## 2024-05-08 DIAGNOSIS — I48.11 LONGSTANDING PERSISTENT ATRIAL FIBRILLATION: ICD-10-CM

## 2024-05-08 DIAGNOSIS — I10 PRIMARY HYPERTENSION: ICD-10-CM

## 2024-05-08 DIAGNOSIS — I48.92 ATRIAL FLUTTER WITH CONTROLLED RESPONSE: ICD-10-CM

## 2024-05-08 DIAGNOSIS — G47.33 OBSTRUCTIVE SLEEP APNEA SYNDROME: ICD-10-CM

## 2024-05-08 LAB
ALBUMIN SERPL-MCNC: 4.3 G/DL (ref 3.5–5.2)
ALBUMIN/GLOB SERPL: 1.4 G/DL
ALP SERPL-CCNC: 65 U/L (ref 39–117)
ALT SERPL W P-5'-P-CCNC: 18 U/L (ref 1–33)
ANION GAP SERPL CALCULATED.3IONS-SCNC: 14.4 MMOL/L (ref 5–15)
AST SERPL-CCNC: 20 U/L (ref 1–32)
BILIRUB SERPL-MCNC: 0.4 MG/DL (ref 0–1.2)
BUN SERPL-MCNC: 26 MG/DL (ref 8–23)
BUN/CREAT SERPL: 16.8 (ref 7–25)
CALCIUM SPEC-SCNC: 9.6 MG/DL (ref 8.6–10.5)
CHLORIDE SERPL-SCNC: 102 MMOL/L (ref 98–107)
CO2 SERPL-SCNC: 23.6 MMOL/L (ref 22–29)
CREAT SERPL-MCNC: 1.55 MG/DL (ref 0.57–1)
EGFRCR SERPLBLD CKD-EPI 2021: 37.5 ML/MIN/1.73
GLOBULIN UR ELPH-MCNC: 3 GM/DL
GLUCOSE SERPL-MCNC: 101 MG/DL (ref 65–99)
POTASSIUM SERPL-SCNC: 4.5 MMOL/L (ref 3.5–5.2)
PROT SERPL-MCNC: 7.3 G/DL (ref 6–8.5)
SODIUM SERPL-SCNC: 140 MMOL/L (ref 136–145)
TSH SERPL DL<=0.05 MIU/L-ACNC: 1.11 UIU/ML (ref 0.27–4.2)

## 2024-05-08 PROCEDURE — 71046 X-RAY EXAM CHEST 2 VIEWS: CPT

## 2024-05-08 PROCEDURE — 84443 ASSAY THYROID STIM HORMONE: CPT

## 2024-05-08 PROCEDURE — 80053 COMPREHEN METABOLIC PANEL: CPT

## 2024-05-08 PROCEDURE — 36415 COLL VENOUS BLD VENIPUNCTURE: CPT

## 2024-05-09 ENCOUNTER — TELEPHONE (OUTPATIENT)
Dept: CARDIOLOGY | Facility: CLINIC | Age: 64
End: 2024-05-09
Payer: COMMERCIAL

## 2024-05-29 RX ORDER — DILTIAZEM HYDROCHLORIDE 240 MG/1
240 CAPSULE, COATED, EXTENDED RELEASE ORAL NIGHTLY
Qty: 90 CAPSULE | Refills: 2 | Status: SHIPPED | OUTPATIENT
Start: 2024-05-29

## 2024-05-29 RX ORDER — VALSARTAN 320 MG/1
320 TABLET ORAL DAILY
Qty: 90 TABLET | Refills: 3 | Status: SHIPPED | OUTPATIENT
Start: 2024-05-29

## 2024-06-07 ENCOUNTER — TELEPHONE (OUTPATIENT)
Dept: INTERNAL MEDICINE | Facility: CLINIC | Age: 64
End: 2024-06-07

## 2024-06-07 DIAGNOSIS — E78.2 MIXED HYPERLIPIDEMIA: ICD-10-CM

## 2024-06-07 DIAGNOSIS — I10 PRIMARY HYPERTENSION: Primary | ICD-10-CM

## 2024-06-07 DIAGNOSIS — E11.9 TYPE 2 DIABETES MELLITUS WITHOUT COMPLICATION, WITHOUT LONG-TERM CURRENT USE OF INSULIN: ICD-10-CM

## 2024-06-07 NOTE — TELEPHONE ENCOUNTER
Caller: Claudia Gage    Relationship: Self    Best call back number: [phone]    What orders are you requesting (i.e. lab or imaging): LABS FOR PHYSICAL     In what timeframe would the patient need to come in: PHYSICAL SCHEDULED 8-1-24       Additional notes: PLEASE CALL TO SCHEDULE AFTER ORDERS ARE ENTERED

## 2024-06-27 RX ORDER — AMIODARONE HYDROCHLORIDE 200 MG/1
200 TABLET ORAL DAILY
Qty: 90 TABLET | Refills: 1 | Status: SHIPPED | OUTPATIENT
Start: 2024-06-27

## 2024-06-27 RX ORDER — RIVAROXABAN 20 MG/1
20 TABLET, FILM COATED ORAL DAILY
Qty: 90 TABLET | Refills: 1 | Status: SHIPPED | OUTPATIENT
Start: 2024-06-27

## 2024-06-27 NOTE — TELEPHONE ENCOUNTER
Failed protocol    NOV-04/28/25-RM  LOV-04/23/24-NM    Atrial flutter:  Her heart rate is controlled on 200 mg amiodarone daily and 240 mg diltiazem daily.  She is anticoagulated with 20 mg Xarelto for [id number] score of 3.  Patient is in normal sinus rhythm today.     Persistent atrial fibrillation: She had cardioversion for A-fib in January 2022 and eventually converted back into A-fib.  She had second cardioversion in April 2023 but then converted back into A-fib by June 2023.  Pt is in NSR.      Hypertension: Elevated in office today.  Patient understands to limit her sodium intake along with exercise.  Patient to continue monitoring     Hyperlipidemia: This is treated with Zetia and pravastatin.  Lipids not at goal when checked in April 2023.     Type 2 diabetes: This is diet controlled and hemoglobin A1c at goal in April 2023.     Obstructive sleep apnea: She is compliant with CPAP.        Patient doing well overall without any issues.  Patient feels well.  I will order labs and chest xray due to use of amiodarone use. Patient to have follow-up and 1 year with Dr. Vu.  Patient to call the office with any questions or concerns.

## 2024-07-11 ENCOUNTER — TELEPHONE (OUTPATIENT)
Dept: CARDIOLOGY | Facility: CLINIC | Age: 64
End: 2024-07-11
Payer: COMMERCIAL

## 2024-07-11 RX ORDER — AMIODARONE HYDROCHLORIDE 200 MG/1
200 TABLET ORAL DAILY
Qty: 90 TABLET | Refills: 1 | Status: SHIPPED | OUTPATIENT
Start: 2024-07-11

## 2024-07-15 RX ORDER — METOPROLOL SUCCINATE 200 MG/1
200 TABLET, EXTENDED RELEASE ORAL
Qty: 90 TABLET | Refills: 2 | Status: SHIPPED | OUTPATIENT
Start: 2024-07-15

## 2024-07-29 ENCOUNTER — TELEPHONE (OUTPATIENT)
Dept: INTERNAL MEDICINE | Facility: CLINIC | Age: 64
End: 2024-07-29
Payer: COMMERCIAL

## 2024-07-29 NOTE — TELEPHONE ENCOUNTER
Caller: Claudia Gage    Relationship: Self    Best call back number: [phone]     What orders are you requesting (i.e. lab or imaging): LABS    In what timeframe would the patient need to come in: THIS WEEK    Where will you receive your lab/imaging services: IN HOUSE    Additional notes: PATIENT REQUESTS CALL BACK TO SCHEDULE LABS.  LABS ORDERS ARE IN.

## 2024-07-30 NOTE — TELEPHONE ENCOUNTER
OK FOR HUB TO READ.  LVM for patient with date and time of lab appt and to call back to confirm or reschedule.

## 2024-08-01 NOTE — TELEPHONE ENCOUNTER
Patient requesting refills on the attached meds.    NOV-04/28/25-RM  LOV-04/23/24-NM    Atrial flutter:  Her heart rate is controlled on 200 mg amiodarone daily and 240 mg diltiazem daily.  She is anticoagulated with 20 mg Xarelto for [id number] score of 3.  Patient is in normal sinus rhythm today.     Persistent atrial fibrillation: She had cardioversion for A-fib in January 2022 and eventually converted back into A-fib.  She had second cardioversion in April 2023 but then converted back into A-fib by June 2023.  Pt is in NSR.      Hypertension: Elevated in office today.  Patient understands to limit her sodium intake along with exercise.  Patient to continue monitoring     Hyperlipidemia: This is treated with Zetia and pravastatin.  Lipids not at goal when checked in April 2023.     Type 2 diabetes: This is diet controlled and hemoglobin A1c at goal in April 2023.     Obstructive sleep apnea: She is compliant with CPAP.        Patient doing well overall without any issues.  Patient feels well.  I will order labs and chest xray due to use of amiodarone use. Patient to have follow-up and 1 year with Dr. Vu.  Patient to call the office with any questions or concerns.

## 2024-08-02 ENCOUNTER — TELEPHONE (OUTPATIENT)
Dept: INTERNAL MEDICINE | Facility: CLINIC | Age: 64
End: 2024-08-02

## 2024-08-02 RX ORDER — AMIODARONE HYDROCHLORIDE 200 MG/1
200 TABLET ORAL DAILY
Qty: 90 TABLET | Refills: 1 | Status: SHIPPED | OUTPATIENT
Start: 2024-08-02

## 2024-08-02 RX ORDER — VALSARTAN 320 MG/1
320 TABLET ORAL DAILY
Qty: 90 TABLET | Refills: 1 | Status: SHIPPED | OUTPATIENT
Start: 2024-08-02

## 2024-08-02 RX ORDER — PRAVASTATIN SODIUM 20 MG
20 TABLET ORAL NIGHTLY
Qty: 90 TABLET | Refills: 3 | Status: SHIPPED | OUTPATIENT
Start: 2024-08-02

## 2024-08-02 NOTE — TELEPHONE ENCOUNTER
Hub staff attempted to follow warm transfer process and was unsuccessful     Caller: Claudia Gage    Relationship to patient: Self    Best call back number: [phone]     Patient is needing: TO SCHEDULE AN APPOINTMENT FOR LABS

## 2024-08-05 RX ORDER — DILTIAZEM HYDROCHLORIDE 240 MG/1
240 CAPSULE, COATED, EXTENDED RELEASE ORAL NIGHTLY
Qty: 90 CAPSULE | Refills: 1 | Status: SHIPPED | OUTPATIENT
Start: 2024-08-05

## 2024-08-05 RX ORDER — EZETIMIBE 10 MG/1
10 TABLET ORAL DAILY
Qty: 90 TABLET | Refills: 1 | Status: SHIPPED | OUTPATIENT
Start: 2024-08-05

## 2024-08-05 RX ORDER — METOPROLOL SUCCINATE 200 MG/1
200 TABLET, EXTENDED RELEASE ORAL
Qty: 90 TABLET | Refills: 1 | Status: SHIPPED | OUTPATIENT
Start: 2024-08-05

## 2024-08-05 RX ORDER — ERGOCALCIFEROL 1.25 MG/1
50000 CAPSULE ORAL
Qty: 12 CAPSULE | Refills: 1 | Status: SHIPPED | OUTPATIENT
Start: 2024-08-05

## 2024-09-17 ENCOUNTER — OFFICE VISIT (OUTPATIENT)
Dept: INTERNAL MEDICINE | Facility: CLINIC | Age: 64
End: 2024-09-17
Payer: COMMERCIAL

## 2024-09-17 VITALS
SYSTOLIC BLOOD PRESSURE: 138 MMHG | WEIGHT: 233.4 LBS | OXYGEN SATURATION: 97 % | HEIGHT: 67 IN | HEART RATE: 57 BPM | DIASTOLIC BLOOD PRESSURE: 80 MMHG | TEMPERATURE: 97.7 F | BODY MASS INDEX: 36.63 KG/M2

## 2024-09-17 DIAGNOSIS — Z23 NEED FOR TDAP VACCINATION: ICD-10-CM

## 2024-09-17 DIAGNOSIS — R31.21 ASYMPTOMATIC MICROSCOPIC HEMATURIA: ICD-10-CM

## 2024-09-17 DIAGNOSIS — E11.9 ENCOUNTER FOR DIABETIC FOOT EXAM: ICD-10-CM

## 2024-09-17 DIAGNOSIS — E11.65 TYPE 2 DIABETES MELLITUS WITH HYPERGLYCEMIA, WITHOUT LONG-TERM CURRENT USE OF INSULIN: ICD-10-CM

## 2024-09-17 DIAGNOSIS — Z12.31 ENCOUNTER FOR SCREENING MAMMOGRAM FOR MALIGNANT NEOPLASM OF BREAST: ICD-10-CM

## 2024-09-17 DIAGNOSIS — Z00.00 ENCOUNTER FOR WELLNESS EXAMINATION IN ADULT: Primary | ICD-10-CM

## 2024-09-17 DIAGNOSIS — E11.22 TYPE 2 DIABETES MELLITUS WITH STAGE 3A CHRONIC KIDNEY DISEASE, WITHOUT LONG-TERM CURRENT USE OF INSULIN: ICD-10-CM

## 2024-09-17 DIAGNOSIS — I10 PRIMARY HYPERTENSION: ICD-10-CM

## 2024-09-17 DIAGNOSIS — N18.31 TYPE 2 DIABETES MELLITUS WITH STAGE 3A CHRONIC KIDNEY DISEASE, WITHOUT LONG-TERM CURRENT USE OF INSULIN: ICD-10-CM

## 2024-09-17 DIAGNOSIS — E78.2 MIXED HYPERLIPIDEMIA: ICD-10-CM

## 2024-09-17 PROCEDURE — 99214 OFFICE O/P EST MOD 30 MIN: CPT | Performed by: NURSE PRACTITIONER

## 2024-09-17 PROCEDURE — 90715 TDAP VACCINE 7 YRS/> IM: CPT | Performed by: NURSE PRACTITIONER

## 2024-09-17 PROCEDURE — 90471 IMMUNIZATION ADMIN: CPT | Performed by: NURSE PRACTITIONER

## 2024-09-17 PROCEDURE — 99396 PREV VISIT EST AGE 40-64: CPT | Performed by: NURSE PRACTITIONER

## 2024-09-20 LAB
APPEARANCE UR: CLEAR
BACTERIA #/AREA URNS HPF: NORMAL /HPF
BACTERIA UR CULT: ABNORMAL
BACTERIA UR CULT: ABNORMAL
BILIRUB UR QL STRIP: NEGATIVE
CASTS URNS MICRO: NORMAL
COLOR UR: YELLOW
EPI CELLS #/AREA URNS HPF: NORMAL /HPF
GLUCOSE UR QL STRIP: NEGATIVE
HGB UR QL STRIP: ABNORMAL
KETONES UR QL STRIP: NEGATIVE
LEUKOCYTE ESTERASE UR QL STRIP: ABNORMAL
NITRITE UR QL STRIP: NEGATIVE
OTHER ANTIBIOTIC SUSC ISLT: ABNORMAL
PH UR STRIP: 6 [PH] (ref 5–8)
PROT UR QL STRIP: ABNORMAL
RBC #/AREA URNS HPF: NORMAL /HPF
SP GR UR STRIP: 1.02 (ref 1–1.03)
UROBILINOGEN UR STRIP-MCNC: ABNORMAL MG/DL
WBC #/AREA URNS HPF: NORMAL /HPF

## 2024-09-23 RX ORDER — ERGOCALCIFEROL 1.25 MG/1
50000 CAPSULE, LIQUID FILLED ORAL
Qty: 13 CAPSULE | Refills: 3 | Status: SHIPPED | OUTPATIENT
Start: 2024-09-23

## 2024-09-23 RX ORDER — CEPHALEXIN 500 MG/1
500 CAPSULE ORAL 3 TIMES DAILY
Qty: 21 CAPSULE | Refills: 0 | Status: SHIPPED | OUTPATIENT
Start: 2024-09-23 | End: 2024-09-30

## 2024-10-15 ENCOUNTER — HOSPITAL ENCOUNTER (OUTPATIENT)
Dept: MAMMOGRAPHY | Facility: HOSPITAL | Age: 64
Discharge: HOME | End: 2024-10-15
Admitting: NURSE PRACTITIONER
Payer: COMMERCIAL

## 2024-10-15 DIAGNOSIS — Z12.31 ENCOUNTER FOR SCREENING MAMMOGRAM FOR MALIGNANT NEOPLASM OF BREAST: ICD-10-CM

## 2024-10-15 PROCEDURE — 77063 BREAST TOMOSYNTHESIS BI: CPT | Performed by: RADIOLOGY

## 2024-10-15 PROCEDURE — 77063 BREAST TOMOSYNTHESIS BI: CPT

## 2024-10-15 PROCEDURE — 77067 SCR MAMMO BI INCL CAD: CPT

## 2024-10-15 PROCEDURE — 77067 SCR MAMMO BI INCL CAD: CPT | Performed by: RADIOLOGY

## 2024-10-21 ENCOUNTER — TELEPHONE (OUTPATIENT)
Dept: INTERNAL MEDICINE | Facility: CLINIC | Age: 64
End: 2024-10-21

## 2024-10-21 NOTE — TELEPHONE ENCOUNTER
Caller: Claudia Gage    Relationship: Self    Best call back number: [phone]     What was the call regarding: PATIENT SCHEDULED FOR 10/31/24. PATIENT STATED  IS CONCERNED ABOUT BEING OFF HER MEDICATION (Tirzepatide (MOUNJARO) 2.5 MG/0.5ML solution pen-injector pen) FOR A WEEK. PATIENT ATTEMPTED TO SCHEDULE A SOONER APPOINTMENT BUT THERE WERE NONE AVAILABLE. PLEASE ADVISE.       none

## 2024-10-23 DIAGNOSIS — E11.22 TYPE 2 DIABETES MELLITUS WITH STAGE 3A CHRONIC KIDNEY DISEASE, WITHOUT LONG-TERM CURRENT USE OF INSULIN: ICD-10-CM

## 2024-10-23 DIAGNOSIS — E11.65 TYPE 2 DIABETES MELLITUS WITH HYPERGLYCEMIA, WITHOUT LONG-TERM CURRENT USE OF INSULIN: ICD-10-CM

## 2024-10-23 DIAGNOSIS — N18.31 TYPE 2 DIABETES MELLITUS WITH STAGE 3A CHRONIC KIDNEY DISEASE, WITHOUT LONG-TERM CURRENT USE OF INSULIN: ICD-10-CM

## 2024-10-23 NOTE — TELEPHONE ENCOUNTER
PATIENT SCHEDULED FOR 10/31/24. PATIENT STATED  IS CONCERNED ABOUT BEING OFF HER MEDICATION (Tirzepatide (MOUNJARO) 2.5 MG/0.5ML solution pen-injector pen) FOR A WEEK. PATIENT ATTEMPTED TO SCHEDULE A SOONER APPOINTMENT BUT THERE WERE NONE AVAILABLE. PLEASE ADVISE.       Rx Refill Note  Requested Prescriptions     Pending Prescriptions Disp Refills    Tirzepatide (MOUNJARO) 2.5 MG/0.5ML solution pen-injector pen 2 mL 0     Sig: Inject 0.5 mL under the skin into the appropriate area as directed 1 (One) Time Per Week.      Last office visit with prescribing clinician: 9/17/2024   Last telemedicine visit with prescribing clinician: Visit date not found   Next office visit with prescribing clinician: 10/31/2024                         Would you like a call back once the refill request has been completed: [] Yes [] No    If the office needs to give you a call back, can they leave a voicemail: [] Yes [] No    Bud Fung MA  10/23/24, 14:09 EDT

## 2024-10-23 NOTE — TELEPHONE ENCOUNTER
Sent a refill request for month supply to covering providers. PCP out of office this week. Pt has appt 10/31

## 2024-10-31 ENCOUNTER — OFFICE VISIT (OUTPATIENT)
Dept: INTERNAL MEDICINE | Facility: CLINIC | Age: 64
End: 2024-10-31
Payer: COMMERCIAL

## 2024-10-31 VITALS
BODY MASS INDEX: 33.21 KG/M2 | OXYGEN SATURATION: 99 % | WEIGHT: 211.6 LBS | SYSTOLIC BLOOD PRESSURE: 112 MMHG | HEART RATE: 68 BPM | TEMPERATURE: 97.7 F | DIASTOLIC BLOOD PRESSURE: 64 MMHG | HEIGHT: 67 IN

## 2024-10-31 DIAGNOSIS — R11.0 NAUSEA: ICD-10-CM

## 2024-10-31 DIAGNOSIS — I10 PRIMARY HYPERTENSION: ICD-10-CM

## 2024-10-31 DIAGNOSIS — E11.9 TYPE 2 DIABETES MELLITUS WITHOUT COMPLICATION, WITHOUT LONG-TERM CURRENT USE OF INSULIN: Primary | ICD-10-CM

## 2024-10-31 DIAGNOSIS — Z23 NEED FOR INFLUENZA VACCINATION: ICD-10-CM

## 2024-10-31 DIAGNOSIS — E78.2 MIXED HYPERLIPIDEMIA: ICD-10-CM

## 2024-10-31 PROCEDURE — 90471 IMMUNIZATION ADMIN: CPT | Performed by: NURSE PRACTITIONER

## 2024-10-31 PROCEDURE — 99214 OFFICE O/P EST MOD 30 MIN: CPT | Performed by: NURSE PRACTITIONER

## 2024-10-31 PROCEDURE — 90656 IIV3 VACC NO PRSV 0.5 ML IM: CPT | Performed by: NURSE PRACTITIONER

## 2024-10-31 RX ORDER — TIRZEPATIDE 2.5 MG/.5ML
INJECTION, SOLUTION SUBCUTANEOUS
COMMUNITY
Start: 2024-10-23 | End: 2024-10-31 | Stop reason: SDUPTHER

## 2024-10-31 RX ORDER — ONDANSETRON 8 MG/1
8 TABLET, FILM COATED ORAL EVERY 8 HOURS PRN
Qty: 20 TABLET | Refills: 0 | Status: SHIPPED | OUTPATIENT
Start: 2024-10-31

## 2024-10-31 NOTE — PROGRESS NOTES
Chief Complaint   Patient presents with    Hypertension     6 week follow up    Hyperlipidemia    Diabetes       SUBJECTIVE  Claudia Gage is a 64 y.o. female presenting today for follow up of her chronic health conditions.    Presents for chronic disease management  r/t afib, HTN, HLD, and DM. She had myalgias w/ several different statins.     Started Mounjaro 6wks ago. No snacking. Not drinking any soft drinks. With the last 2 injections she has experienced some nausea and vomiting. No change in bowel habits.     Outpatient Medications Marked as Taking for the 10/31/24 encounter (Office Visit) with Darlin Kirkland APRN   Medication Sig Dispense Refill    amiodarone (PACERONE) 200 MG tablet Take 1 tablet by mouth Daily. 90 tablet 1    clobetasol (TEMOVATE) 0.05 % cream Apply  topically to the appropriate area as directed 2 (Two) Times a Day. 60 g 1    dilTIAZem CD (CARDIZEM CD) 240 MG 24 hr capsule Take 1 capsule by mouth Every Night. 90 capsule 1    Evolocumab (REPATHA) solution prefilled syringe injection Inject 1 mL under the skin into the appropriate area as directed Every 14 (Fourteen) Days. 6 mL 1    ezetimibe (ZETIA) 10 MG tablet Take 1 tablet by mouth Daily. 90 tablet 1    fexofenadine (ALLEGRA) 60 MG tablet Take 1 tablet by mouth Daily.      fluticasone (FLONASE) 50 MCG/ACT nasal spray 2 sprays into each nostril Daily. 1 each 11    metFORMIN (GLUCOPHAGE) 500 MG tablet TAKE 2 TABLETS BY MOUTH TWICE A  tablet 3    metoprolol succinate XL (TOPROL-XL) 200 MG 24 hr tablet Take 1 tablet by mouth every night at bedtime. 90 tablet 1    Mounjaro 2.5 MG/0.5ML solution auto-injector ADMINISTER 2.5 MG UNDER THE SKIN 1 TIME EVERY WEEK AS DIRECTED      Peppermint Oil 90 MG capsule controlled-release Take 1 capsule by mouth 2 (Two) Times a Day. 180 capsule 3    pravastatin (Pravachol) 20 MG tablet Take 1 tablet by mouth Every Night. 90 tablet 3    Probiotic Product (PROBIOTIC-10 PO) Take  by mouth.       "rivaroxaban (Xarelto) 20 MG tablet Take 1 tablet by mouth Daily. 90 tablet 1    valsartan (DIOVAN) 320 MG tablet Take 1 tablet by mouth Daily. 90 tablet 1    vitamin C (ASCORBIC ACID) 250 MG tablet Take 1 tablet by mouth Daily.      vitamin D (ERGOCALCIFEROL) 1.25 MG (77606 UT) capsule capsule TAKE 1 CAPSULE BY MOUTH EVERY 7  DAYS 13 capsule 3    Zinc 50 MG tablet Take  by mouth.           The following portions of the patient's history were reviewed and updated as appropriate: allergies, current medications, past family history, past medical history, past social history, past surgical history and problem list.    Review of Systems   Gastrointestinal:  Negative for abdominal pain.       Objective   Vitals:    10/31/24 1356   BP: 112/64   BP Location: Left arm   Patient Position: Sitting   Cuff Size: Large Adult   Pulse: 68   Temp: 97.7 °F (36.5 °C)   TempSrc: Infrared   SpO2: 99%   Weight: 96 kg (211 lb 9.6 oz)   Height: 170.2 cm (67\")       BP Readings from Last 3 Encounters:   10/31/24 112/64   09/17/24 138/80   04/23/24 144/70        Wt Readings from Last 3 Encounters:   10/31/24 96 kg (211 lb 9.6 oz)   09/17/24 106 kg (233 lb 6.4 oz)   04/23/24 105 kg (231 lb 8 oz)        Body mass index is 33.14 kg/m².  Nursing notes and vitals reviewed.    Physical Exam  Constitutional:       General: She is not in acute distress.     Appearance: She is well-developed.   Cardiovascular:      Rate and Rhythm: Regular rhythm.      Heart sounds: S1 normal and S2 normal.   Pulmonary:      Effort: Pulmonary effort is normal.      Breath sounds: Normal breath sounds.   Abdominal:      General: Bowel sounds are normal. There is no distension.      Palpations: There is no hepatomegaly, splenomegaly or mass.      Tenderness: There is no abdominal tenderness.   Neurological:      Mental Status: She is alert and oriented to person, place, and time.   Psychiatric:         Attention and Perception: She is attentive.         Behavior: " Behavior normal.         Thought Content: Thought content normal.           Data Reviewed:  No results found for this or any previous visit (from the past 4 weeks).      Assessment & Plan   Diagnoses and all orders for this visit:    1. Type 2 diabetes mellitus without complication, without long-term current use of insulin (Primary)  -     Tirzepatide 2.5 MG/0.5ML solution auto-injector; Inject 2.5 mg under the skin into the appropriate area as directed 1 (One) Time Per Week.  Dispense: 4 mL; Refill: 1  -     Comprehensive Metabolic Panel; Future  -     Hemoglobin A1c; Future  -     Lipid Panel With / Chol / HDL Ratio; Future    2. Nausea  -     ondansetron (Zofran) 8 MG tablet; Take 1 tablet by mouth Every 8 (Eight) Hours As Needed for Nausea or Vomiting.  Dispense: 20 tablet; Refill: 0    3. Primary hypertension  -     Comprehensive Metabolic Panel; Future    4. Mixed hyperlipidemia  -     Comprehensive Metabolic Panel; Future  -     Lipid Panel With / Chol / HDL Ratio; Future    5. Need for influenza vaccination  -     Fluzone >6mos        #1, 2. Hold at 2.5mg dose and hopefully nausea will subside.    3. Continue current regimen.    4. Continue current regimen.      Medications, including side effects, were discussed with the patient. Patient verbalized understanding.  The plan of care was discussed. All questions were answered. Patient verbalized understanding.      Return in about 6 weeks (around 12/12/2024) for fasting labs one week prior to.

## 2024-11-19 DIAGNOSIS — E11.9 TYPE 2 DIABETES MELLITUS WITHOUT COMPLICATION, WITHOUT LONG-TERM CURRENT USE OF INSULIN: ICD-10-CM

## 2024-11-19 NOTE — TELEPHONE ENCOUNTER
Caller: Claudia Gage    Relationship: Self    Best call back number: [phone]     Requested Prescriptions:   Requested Prescriptions     Pending Prescriptions Disp Refills    Tirzepatide 2.5 MG/0.5ML solution auto-injector 4 mL 1     Sig: Inject 2.5 mg under the skin into the appropriate area as directed 1 (One) Time Per Week.        Pharmacy where request should be sent: [id number] - [place] [address] AT [facility] & [address] - [phone]  - [phone] FX     Last office visit with prescribing clinician: 10/31/2024   Last telemedicine visit with prescribing clinician: Visit date not found   Next office visit with prescribing clinician: 12/9/2024       Would you like a call back once the refill request has been completed: [] Yes [x] No    If the office needs to give you a call back, can they leave a voicemail: [] Yes [x] No    Joey Oh Rep   11/19/24 16:08 EST

## 2024-11-21 DIAGNOSIS — I10 PRIMARY HYPERTENSION: ICD-10-CM

## 2024-11-21 DIAGNOSIS — E11.9 TYPE 2 DIABETES MELLITUS WITHOUT COMPLICATION, WITHOUT LONG-TERM CURRENT USE OF INSULIN: ICD-10-CM

## 2024-11-21 DIAGNOSIS — E78.2 MIXED HYPERLIPIDEMIA: ICD-10-CM

## 2024-11-25 ENCOUNTER — TELEPHONE (OUTPATIENT)
Dept: INTERNAL MEDICINE | Facility: CLINIC | Age: 64
End: 2024-11-25

## 2024-11-25 NOTE — TELEPHONE ENCOUNTER
Caller: Claudia Gage    Relationship: Self    Best call back number:     What is the best time to reach you:     Who are you requesting to speak with (clinical staff, provider,  specific staff member):     Do you know the name of the person who called:     What was the call regarding: PATIENT IS CALLING IN WISHING TO DISCUSS HER PRIOR AUTHORIZATION FOR HER JEANNETTE

## 2024-11-26 NOTE — TELEPHONE ENCOUNTER
Did she change insurance? She has been on Mounjaro. Trulicity has been on national backorder for months.

## 2024-11-26 NOTE — TELEPHONE ENCOUNTER
Spoke with insurance x3 and pharmacy x2. Called and spoke with patient and her  recently retired so her insurance changed. Patient only has 1 card and this did not have any pharmacy info listed on it. Patient provided a provider line from back of card: [phone]. I will call and see if they are able to provide me with updated information

## 2024-11-27 NOTE — TELEPHONE ENCOUNTER
Called and spoke with provider line, was transferred to pharmacy pa department. They were unable to locate patient in system and state patient may have different pharmacy plan than medical. Will call to follow up with patient

## 2024-11-27 NOTE — TELEPHONE ENCOUNTER
Spoke with patient again- she confirmed that she did not receive another pharmacy insurance card and only received a medical ins card when they switched insurances. Patient said she did just have a different prescription filled at [facility] this week. Will call [facility] to check which insurance was billed for this

## 2024-12-02 DIAGNOSIS — E11.9 TYPE 2 DIABETES MELLITUS WITHOUT COMPLICATION, WITHOUT LONG-TERM CURRENT USE OF INSULIN: ICD-10-CM

## 2024-12-04 LAB
ALBUMIN SERPL-MCNC: 4.3 G/DL (ref 3.5–5.2)
ALBUMIN/GLOB SERPL: 1.6 G/DL
ALP SERPL-CCNC: 59 U/L (ref 39–117)
ALT SERPL-CCNC: 28 U/L (ref 1–33)
AST SERPL-CCNC: 18 U/L (ref 1–32)
BILIRUB SERPL-MCNC: 0.5 MG/DL (ref 0–1.2)
BUN SERPL-MCNC: 24 MG/DL (ref 8–23)
BUN/CREAT SERPL: 16.3 (ref 7–25)
CALCIUM SERPL-MCNC: 9.9 MG/DL (ref 8.6–10.5)
CHLORIDE SERPL-SCNC: 104 MMOL/L (ref 98–107)
CHOLEST SERPL-MCNC: 179 MG/DL (ref 0–200)
CHOLEST/HDLC SERPL: 4.16 {RATIO}
CO2 SERPL-SCNC: 26.1 MMOL/L (ref 22–29)
CREAT SERPL-MCNC: 1.47 MG/DL (ref 0.57–1)
EGFRCR SERPLBLD CKD-EPI 2021: 39.7 ML/MIN/1.73
GLOBULIN SER CALC-MCNC: 2.7 GM/DL
GLUCOSE SERPL-MCNC: 117 MG/DL (ref 65–99)
HBA1C MFR BLD: 6.1 % (ref 4.8–5.6)
HDLC SERPL-MCNC: 43 MG/DL (ref 40–60)
LDLC SERPL CALC-MCNC: 107 MG/DL (ref 0–100)
POTASSIUM SERPL-SCNC: 5 MMOL/L (ref 3.5–5.2)
PROT SERPL-MCNC: 7 G/DL (ref 6–8.5)
SODIUM SERPL-SCNC: 138 MMOL/L (ref 136–145)
TRIGL SERPL-MCNC: 163 MG/DL (ref 0–150)
VLDLC SERPL CALC-MCNC: 29 MG/DL (ref 5–40)

## 2024-12-09 ENCOUNTER — OFFICE VISIT (OUTPATIENT)
Dept: INTERNAL MEDICINE | Facility: CLINIC | Age: 64
End: 2024-12-09
Payer: COMMERCIAL

## 2024-12-09 VITALS
HEART RATE: 58 BPM | DIASTOLIC BLOOD PRESSURE: 74 MMHG | TEMPERATURE: 97.8 F | OXYGEN SATURATION: 97 % | BODY MASS INDEX: 32.27 KG/M2 | SYSTOLIC BLOOD PRESSURE: 138 MMHG | WEIGHT: 205.6 LBS | HEIGHT: 67 IN

## 2024-12-09 DIAGNOSIS — E11.9 TYPE 2 DIABETES MELLITUS WITHOUT COMPLICATION, WITHOUT LONG-TERM CURRENT USE OF INSULIN: ICD-10-CM

## 2024-12-09 DIAGNOSIS — E04.9 GOITER: Primary | ICD-10-CM

## 2024-12-09 DIAGNOSIS — N28.9 RENAL INSUFFICIENCY: ICD-10-CM

## 2024-12-09 DIAGNOSIS — I48.11 LONGSTANDING PERSISTENT ATRIAL FIBRILLATION: ICD-10-CM

## 2024-12-09 DIAGNOSIS — I10 PRIMARY HYPERTENSION: ICD-10-CM

## 2024-12-09 DIAGNOSIS — E78.2 MIXED HYPERLIPIDEMIA: ICD-10-CM

## 2024-12-09 DIAGNOSIS — E04.1 LEFT THYROID NODULE: ICD-10-CM

## 2024-12-09 PROBLEM — Z12.11 ENCOUNTER FOR SCREENING FOR MALIGNANT NEOPLASM OF COLON: Status: RESOLVED | Noted: 2021-07-29 | Resolved: 2024-12-09

## 2024-12-09 PROBLEM — R19.7 DIARRHEA OF PRESUMED INFECTIOUS ORIGIN: Status: RESOLVED | Noted: 2021-07-28 | Resolved: 2024-12-09

## 2024-12-09 PROBLEM — N30.90 CYSTITIS: Status: RESOLVED | Noted: 2022-09-09 | Resolved: 2024-12-09

## 2024-12-09 PROBLEM — R35.0 URINARY FREQUENCY: Status: RESOLVED | Noted: 2022-09-09 | Resolved: 2024-12-09

## 2024-12-09 PROCEDURE — 99214 OFFICE O/P EST MOD 30 MIN: CPT | Performed by: NURSE PRACTITIONER

## 2024-12-09 NOTE — ASSESSMENT & PLAN NOTE
- previously f/b Dr. Amin but lost to f/u since 2016  - update US    Orders:    US Thyroid; Future

## 2024-12-09 NOTE — PROGRESS NOTES
Chief Complaint   Patient presents with    Diabetes     6 week follow up       SUBJECTIVE  Claudia Gage is a 64 y.o. female presenting today for follow up of her chronic health conditions.    Presents for chronic disease management r/t afib, HTN, HLD, and DM.      She had to stop Mounjaro 3wks ago d/t insurance.    Outpatient Medications Marked as Taking for the 12/9/24 encounter (Office Visit) with Darlin Kirkland APRN   Medication Sig Dispense Refill    amiodarone (PACERONE) 200 MG tablet Take 1 tablet by mouth Daily. 90 tablet 1    clobetasol (TEMOVATE) 0.05 % cream Apply  topically to the appropriate area as directed 2 (Two) Times a Day. 60 g 1    dilTIAZem CD (CARDIZEM CD) 240 MG 24 hr capsule Take 1 capsule by mouth Every Night. 90 capsule 1    Evolocumab (REPATHA) solution prefilled syringe injection Inject 1 mL under the skin into the appropriate area as directed Every 14 (Fourteen) Days. 6 mL 1    ezetimibe (ZETIA) 10 MG tablet Take 1 tablet by mouth Daily. 90 tablet 1    fexofenadine (ALLEGRA) 60 MG tablet Take 1 tablet by mouth Daily.      fluticasone (FLONASE) 50 MCG/ACT nasal spray 2 sprays into each nostril Daily. 1 each 11    metFORMIN (GLUCOPHAGE) 500 MG tablet TAKE 2 TABLETS BY MOUTH TWICE A  tablet 3    metoprolol succinate XL (TOPROL-XL) 200 MG 24 hr tablet Take 1 tablet by mouth every night at bedtime. 90 tablet 1    ondansetron (Zofran) 8 MG tablet Take 1 tablet by mouth Every 8 (Eight) Hours As Needed for Nausea or Vomiting. 20 tablet 0    Peppermint Oil 90 MG capsule controlled-release Take 1 capsule by mouth 2 (Two) Times a Day. 180 capsule 3    pravastatin (Pravachol) 20 MG tablet Take 1 tablet by mouth Every Night. 90 tablet 3    Probiotic Product (PROBIOTIC-10 PO) Take  by mouth.      rivaroxaban (Xarelto) 20 MG tablet Take 1 tablet by mouth Daily. 90 tablet 1    valsartan (DIOVAN) 320 MG tablet Take 1 tablet by mouth Daily. 90 tablet 1    vitamin C (ASCORBIC ACID) 250 MG  "tablet Take 1 tablet by mouth Daily.      vitamin D (ERGOCALCIFEROL) 1.25 MG (92749 UT) capsule capsule TAKE 1 CAPSULE BY MOUTH EVERY 7  DAYS 13 capsule 3    Zinc 50 MG tablet Take  by mouth.           The following portions of the patient's history were reviewed and updated as appropriate: allergies, current medications, past family history, past medical history, past social history, past surgical history and problem list.        Objective   Vitals:    12/09/24 1536   BP: 138/74   BP Location: Left arm   Patient Position: Sitting   Cuff Size: Large Adult   Pulse: 58   Temp: 97.8 °F (36.6 °C)   TempSrc: Infrared   SpO2: 97%   Weight: 93.3 kg (205 lb 9.6 oz)   Height: 170.2 cm (67\")       BP Readings from Last 3 Encounters:   12/09/24 138/74   10/31/24 112/64   09/17/24 138/80        Wt Readings from Last 3 Encounters:   12/09/24 93.3 kg (205 lb 9.6 oz)   10/31/24 96 kg (211 lb 9.6 oz)   09/17/24 106 kg (233 lb 6.4 oz)        Body mass index is 32.2 kg/m².  Nursing notes and vitals reviewed.    Physical Exam  Constitutional:       General: She is not in acute distress.     Appearance: She is well-developed.   Neck:      Thyroid: Thyromegaly present. No thyroid mass or thyroid tenderness.   Cardiovascular:      Rate and Rhythm: Regular rhythm.      Heart sounds: S1 normal and S2 normal.   Pulmonary:      Effort: Pulmonary effort is normal.      Breath sounds: Normal breath sounds.   Musculoskeletal:      Cervical back: Neck supple.   Lymphadenopathy:      Cervical: No cervical adenopathy.   Neurological:      Mental Status: She is alert and oriented to person, place, and time.   Psychiatric:         Attention and Perception: She is attentive.         Behavior: Behavior normal.         Thought Content: Thought content normal.           Data Reviewed:  Recent Results (from the past 4 weeks)   Lipid Panel With / Chol / HDL Ratio    Collection Time: 12/03/24 10:32 AM    Specimen: Blood   Result Value Ref Range    Total " "Cholesterol 179 0 - 200 mg/dL    Triglycerides 163 (H) 0 - 150 mg/dL    HDL Cholesterol 43 40 - 60 mg/dL    VLDL Cholesterol Jean 29 5 - 40 mg/dL    LDL Chol Calc (NIH) 107 (H) 0 - 100 mg/dL    Chol/HDL Ratio 4.16    Hemoglobin A1c    Collection Time: 12/03/24 10:32 AM    Specimen: Blood   Result Value Ref Range    Hemoglobin A1C 6.10 (H) 4.80 - 5.60 %   Comprehensive Metabolic Panel    Collection Time: 12/03/24 10:32 AM    Specimen: Blood   Result Value Ref Range    Glucose 117 (H) 65 - 99 mg/dL    BUN 24 (H) 8 - 23 mg/dL    Creatinine 1.47 (H) 0.57 - 1.00 mg/dL    EGFR Result 39.7 (L) >60.0 mL/min/1.73    BUN/Creatinine Ratio 16.3 7.0 - 25.0    Sodium 138 136 - 145 mmol/L    Potassium 5.0 3.5 - 5.2 mmol/L    Chloride 104 98 - 107 mmol/L    Total CO2 26.1 22.0 - 29.0 mmol/L    Calcium 9.9 8.6 - 10.5 mg/dL    Total Protein 7.0 6.0 - 8.5 g/dL    Albumin 4.3 3.5 - 5.2 g/dL    Globulin 2.7 gm/dL    A/G Ratio 1.6 g/dL    Total Bilirubin 0.5 0.0 - 1.2 mg/dL    Alkaline Phosphatase 59 39 - 117 U/L    AST (SGOT) 18 1 - 32 U/L    ALT (SGPT) 28 1 - 33 U/L         Assessment & Plan     Assessment & Plan  Goiter  - previously f/b Dr. Amin but lost to f/u since 2016  - update US    Orders:    US Thyroid; Future    Left thyroid nodule  - US-guided FNA 12/2016 \"Benign Follicular Nodule\"    Orders:    US Thyroid; Future    Primary hypertension  - controlled  - continue current regimen         Mixed hyperlipidemia  - improved but still above goal  - continue statin, zetia, and Repatha  - Patient counseled regarding therapeutic lifestyle changes including improved nutrition, increased exercise, and weight loss.           Longstanding persistent atrial fibrillation  - rate controlled  - on DOAC         Type 2 diabetes mellitus without complication, without long-term current use of insulin  - controlled  - continue current regimen         Renal insufficiency  - discussed hydration  - counseled on salt avoidance  - counseled on " avoidance of nephrotoxins  - monitor               Medications, including side effects, were discussed with the patient. Patient verbalized understanding.  The plan of care was discussed. All questions were answered. Patient verbalized understanding.      Return in about 3 months (around 3/9/2025) for fasting labs one week prior to.

## 2024-12-09 NOTE — ASSESSMENT & PLAN NOTE
- improved but still above goal  - continue statin, zetia, and Repatha  - Patient counseled regarding therapeutic lifestyle changes including improved nutrition, increased exercise, and weight loss.

## 2024-12-17 ENCOUNTER — HOSPITAL ENCOUNTER (OUTPATIENT)
Dept: ULTRASOUND IMAGING | Facility: HOSPITAL | Age: 64
Discharge: HOME | End: 2024-12-17
Payer: COMMERCIAL

## 2024-12-17 DIAGNOSIS — E04.9 GOITER: ICD-10-CM

## 2024-12-17 DIAGNOSIS — E04.1 LEFT THYROID NODULE: ICD-10-CM

## 2024-12-30 NOTE — TELEPHONE ENCOUNTER
Refill Protocol Failed, Requesting Diltiazem    LOV w/ NM on 4/23/24   FU w/ RM 04/28/24  Last Labs- 12/03/24 - CMP,Lipid    Please review.    Nicole CASTRO, CMA

## 2024-12-31 RX ORDER — DILTIAZEM HYDROCHLORIDE 240 MG/1
240 CAPSULE, COATED, EXTENDED RELEASE ORAL NIGHTLY
Qty: 90 CAPSULE | Refills: 1 | Status: SHIPPED | OUTPATIENT
Start: 2024-12-31

## 2025-01-20 ENCOUNTER — TELEPHONE (OUTPATIENT)
Dept: INTERNAL MEDICINE | Facility: CLINIC | Age: 65
End: 2025-01-20

## 2025-01-20 DIAGNOSIS — E11.9 TYPE 2 DIABETES MELLITUS WITHOUT COMPLICATION, WITHOUT LONG-TERM CURRENT USE OF INSULIN: ICD-10-CM

## 2025-01-20 RX ORDER — ERGOCALCIFEROL 1.25 MG/1
50000 CAPSULE, LIQUID FILLED ORAL
Qty: 13 CAPSULE | Refills: 3 | Status: SHIPPED | OUTPATIENT
Start: 2025-01-20

## 2025-01-20 NOTE — TELEPHONE ENCOUNTER
Caller: Claudia Gage    Relationship: Self    Best call back number: [phone]     Requested Prescriptions:   Requested Prescriptions     Pending Prescriptions Disp Refills    metFORMIN (GLUCOPHAGE) 500 MG tablet 360 tablet 3     Sig: Take 2 tablets by mouth 2 (Two) Times a Day.    vitamin D (ERGOCALCIFEROL) 1.25 MG (35840 UT) capsule capsule 13 capsule 3     Sig: Take 1 capsule by mouth Every 7 (Seven) Days.        Pharmacy where request should be sent: [address] [phone] [facility] [phone]      Last office visit with prescribing clinician: 12/9/2024   Last telemedicine visit with prescribing clinician: Visit date not found   Next office visit with prescribing clinician: 3/18/2025       Does the patient have less than a 3 day supply:  [] Yes  [x] No

## 2025-01-20 NOTE — TELEPHONE ENCOUNTER
Caller: Claudia Gage    Relationship to patient: Self    Best call back number: [phone]     Patient is needing: A PRIOR AUTHORIZATION ON MOUNJARO

## 2025-01-29 RX ORDER — RIVAROXABAN 20 MG/1
20 TABLET, FILM COATED ORAL DAILY
Qty: 90 TABLET | Refills: 0 | Status: SHIPPED | OUTPATIENT
Start: 2025-01-29

## 2025-02-17 RX ORDER — VALSARTAN 320 MG/1
320 TABLET ORAL DAILY
Qty: 90 TABLET | Refills: 3 | Status: SHIPPED | OUTPATIENT
Start: 2025-02-17

## 2025-02-26 RX ORDER — EZETIMIBE 10 MG/1
10 TABLET ORAL DAILY
Qty: 90 TABLET | Refills: 3 | Status: SHIPPED | OUTPATIENT
Start: 2025-02-26

## 2025-03-04 DIAGNOSIS — I10 PRIMARY HYPERTENSION: ICD-10-CM

## 2025-03-04 DIAGNOSIS — E04.9 GOITER: ICD-10-CM

## 2025-03-04 DIAGNOSIS — N28.9 RENAL INSUFFICIENCY: ICD-10-CM

## 2025-03-04 DIAGNOSIS — E11.9 TYPE 2 DIABETES MELLITUS WITHOUT COMPLICATION, WITHOUT LONG-TERM CURRENT USE OF INSULIN: ICD-10-CM

## 2025-03-04 DIAGNOSIS — E78.2 MIXED HYPERLIPIDEMIA: ICD-10-CM

## 2025-03-11 RX ORDER — AMIODARONE HYDROCHLORIDE 200 MG/1
200 TABLET ORAL DAILY
Qty: 90 TABLET | Refills: 3 | Status: SHIPPED | OUTPATIENT
Start: 2025-03-11

## 2025-03-11 NOTE — TELEPHONE ENCOUNTER
Refill Protocol Failed, Requesting amiodarone    LOV w/ you on 04/23/24   FU w/ RM 04/28/25   Last Labs- 12/03/24    Please review.    Nicole CASTRO CMA

## 2025-03-13 LAB
ALBUMIN SERPL-MCNC: 4.5 G/DL (ref 3.9–4.9)
ALP SERPL-CCNC: 63 IU/L (ref 44–121)
ALT SERPL-CCNC: 33 IU/L (ref 0–32)
AST SERPL-CCNC: 22 IU/L (ref 0–40)
BILIRUB SERPL-MCNC: 0.5 MG/DL (ref 0–1.2)
BUN SERPL-MCNC: 28 MG/DL (ref 8–27)
BUN/CREAT SERPL: 15 (ref 12–28)
CALCIUM SERPL-MCNC: 10.3 MG/DL (ref 8.7–10.3)
CHLORIDE SERPL-SCNC: 100 MMOL/L (ref 96–106)
CHOLEST SERPL-MCNC: 202 MG/DL (ref 100–199)
CHOLEST/HDLC SERPL: 4.7 RATIO (ref 0–4.4)
CO2 SERPL-SCNC: 21 MMOL/L (ref 20–29)
CREAT SERPL-MCNC: 1.85 MG/DL (ref 0.57–1)
EGFRCR SERPLBLD CKD-EPI 2021: 30 ML/MIN/1.73
GLOBULIN SER CALC-MCNC: 3 G/DL (ref 1.5–4.5)
GLUCOSE SERPL-MCNC: 101 MG/DL (ref 70–99)
HBA1C MFR BLD: 5.5 % (ref 4.8–5.6)
HDLC SERPL-MCNC: 43 MG/DL
LDLC SERPL CALC-MCNC: 129 MG/DL (ref 0–99)
POTASSIUM SERPL-SCNC: 4.7 MMOL/L (ref 3.5–5.2)
PROT SERPL-MCNC: 7.5 G/DL (ref 6–8.5)
SODIUM SERPL-SCNC: 139 MMOL/L (ref 134–144)
T4 FREE SERPL-MCNC: 1.87 NG/DL (ref 0.82–1.77)
THYROGLOB AB SERPL-ACNC: <1 IU/ML (ref 0–0.9)
THYROPEROXIDASE AB SERPL-ACNC: 14 IU/ML (ref 0–34)
TRIGL SERPL-MCNC: 170 MG/DL (ref 0–149)
TSH RECEP AB SER-ACNC: <1.1 IU/L (ref 0–1.75)
TSH SERPL DL<=0.005 MIU/L-ACNC: 1.57 UIU/ML (ref 0.45–4.5)
VLDLC SERPL CALC-MCNC: 30 MG/DL (ref 5–40)

## 2025-03-18 ENCOUNTER — OFFICE VISIT (OUTPATIENT)
Dept: INTERNAL MEDICINE | Facility: CLINIC | Age: 65
End: 2025-03-18
Payer: COMMERCIAL

## 2025-03-18 VITALS
BODY MASS INDEX: 28.09 KG/M2 | SYSTOLIC BLOOD PRESSURE: 132 MMHG | OXYGEN SATURATION: 98 % | DIASTOLIC BLOOD PRESSURE: 72 MMHG | WEIGHT: 179 LBS | HEIGHT: 67 IN | TEMPERATURE: 97.7 F | HEART RATE: 56 BPM

## 2025-03-18 DIAGNOSIS — E78.2 MIXED HYPERLIPIDEMIA: ICD-10-CM

## 2025-03-18 DIAGNOSIS — E04.9 GOITER: ICD-10-CM

## 2025-03-18 DIAGNOSIS — I10 PRIMARY HYPERTENSION: Primary | ICD-10-CM

## 2025-03-18 DIAGNOSIS — N18.32 STAGE 3B CHRONIC KIDNEY DISEASE: ICD-10-CM

## 2025-03-18 DIAGNOSIS — E04.1 LEFT THYROID NODULE: ICD-10-CM

## 2025-03-18 DIAGNOSIS — E11.9 TYPE 2 DIABETES MELLITUS WITHOUT COMPLICATION, WITHOUT LONG-TERM CURRENT USE OF INSULIN: ICD-10-CM

## 2025-03-18 RX ORDER — METOPROLOL SUCCINATE 200 MG/1
200 TABLET, EXTENDED RELEASE ORAL
Qty: 90 TABLET | Refills: 3 | Status: SHIPPED | OUTPATIENT
Start: 2025-03-18

## 2025-03-18 NOTE — PATIENT INSTRUCTIONS
Radiology [phone]      Drop Metformin to one tablet twice daily for two weeks and then one tablet once daily for two weeks, then stop completely.

## 2025-03-18 NOTE — TELEPHONE ENCOUNTER
Protocol Failed    NOV - 4/28/2025 (RM)    LOV - 4/23/2025 (NM)        ASSESSMENT & PLAN      Diagnoses and all orders for this visit:     Atrial flutter:  Her heart rate is controlled on 200 mg amiodarone daily and 240 mg diltiazem daily.  She is anticoagulated with 20 mg Xarelto for [id number] score of 3.  Patient is in normal sinus rhythm today.     Persistent atrial fibrillation: She had cardioversion for A-fib in January 2022 and eventually converted back into A-fib.  She had second cardioversion in April 2023 but then converted back into A-fib by June 2023.  Pt is in NSR.      Hypertension: Elevated in office today.  Patient understands to limit her sodium intake along with exercise.  Patient to continue monitoring     Hyperlipidemia: This is treated with Zetia and pravastatin.  Lipids not at goal when checked in April 2023.     Type 2 diabetes: This is diet controlled and hemoglobin A1c at goal in April 2023.     Obstructive sleep apnea: She is compliant with CPAP.        Patient doing well overall without any issues.  Patient feels well.  I will order labs and chest xray due to use of amiodarone use. Patient to have follow-up and 1 year with Dr. Vu.  Patient to call the office with any questions or concerns.      Return in about 1 year (around 4/23/2025) for Dr. Vu.

## 2025-03-18 NOTE — PROGRESS NOTES
Chief Complaint   Patient presents with    Hyperlipidemia     3 month follow up       SUBJECTIVE  Claudia Gage is a 64 y.o. female presenting today for follow up of her chronic health conditions.    These include afib, HTN, HLD, and DM.    Had to cancel appt for thyroid US d/t wait and has not rescheduled.     Outpatient Medications Marked as Taking for the 3/18/25 encounter (Office Visit) with Darlin Kirkland APRN   Medication Sig Dispense Refill    amiodarone (PACERONE) 200 MG tablet TAKE 1 TABLET BY MOUTH DAILY 90 tablet 3    clobetasol (TEMOVATE) 0.05 % cream Apply  topically to the appropriate area as directed 2 (Two) Times a Day. 60 g 1    dilTIAZem CD (CARDIZEM CD) 240 MG 24 hr capsule TAKE 1 CAPSULE BY MOUTH AT NIGHT 90 capsule 1    Evolocumab (REPATHA) solution prefilled syringe injection Inject 1 mL under the skin into the appropriate area as directed Every 14 (Fourteen) Days. 6 mL 1    ezetimibe (ZETIA) 10 MG tablet TAKE 1 TABLET BY MOUTH DAILY 90 tablet 3    fexofenadine (ALLEGRA) 60 MG tablet Take 1 tablet by mouth Daily.      fluticasone (FLONASE) 50 MCG/ACT nasal spray 2 sprays into each nostril Daily. 1 each 11    metFORMIN (GLUCOPHAGE) 500 MG tablet Take 2 tablets by mouth 2 (Two) Times a Day. 360 tablet 3    metoprolol succinate XL (TOPROL-XL) 200 MG 24 hr tablet TAKE 1 TABLET BY MOUTH EVERY  NIGHT AT BEDTIME 90 tablet 3    ondansetron (Zofran) 8 MG tablet Take 1 tablet by mouth Every 8 (Eight) Hours As Needed for Nausea or Vomiting. 20 tablet 0    Peppermint Oil 90 MG capsule controlled-release Take 1 capsule by mouth 2 (Two) Times a Day. 180 capsule 3    pravastatin (Pravachol) 20 MG tablet Take 1 tablet by mouth Every Night. 90 tablet 3    Probiotic Product (PROBIOTIC-10 PO) Take  by mouth.      rivaroxaban (Xarelto) 20 MG tablet TAKE 1 TABLET BY MOUTH DAILY 90 tablet 0    valsartan (DIOVAN) 320 MG tablet TAKE 1 TABLET BY MOUTH DAILY 90 tablet 3    vitamin C (ASCORBIC ACID) 250 MG tablet  "Take 1 tablet by mouth Daily.      vitamin D (ERGOCALCIFEROL) 1.25 MG (43227 UT) capsule capsule Take 1 capsule by mouth Every 7 (Seven) Days. 13 capsule 3    Zinc 50 MG tablet Take  by mouth.           The following portions of the patient's history were reviewed and updated as appropriate: allergies, current medications, past family history, past medical history, past social history, past surgical history and problem list.        Objective   Vitals:    03/18/25 1250   BP: 132/72   BP Location: Left arm   Patient Position: Sitting   Cuff Size: Adult   Pulse: 56   Temp: 97.7 °F (36.5 °C)   TempSrc: Infrared   SpO2: 98%   Weight: 81.2 kg (179 lb)   Height: 170.2 cm (67\")       BP Readings from Last 3 Encounters:   03/18/25 132/72   12/09/24 138/74   10/31/24 112/64        Wt Readings from Last 3 Encounters:   03/18/25 81.2 kg (179 lb)   12/09/24 93.3 kg (205 lb 9.6 oz)   10/31/24 96 kg (211 lb 9.6 oz)        Body mass index is 28.04 kg/m².  Nursing notes and vitals reviewed.    Physical Exam  Constitutional:       General: She is not in acute distress.     Appearance: She is well-developed.   Neck:      Thyroid: Thyroid mass present. No thyromegaly.   Cardiovascular:      Rate and Rhythm: Regular rhythm.      Heart sounds: Murmur heard.   Pulmonary:      Effort: Pulmonary effort is normal.      Breath sounds: Normal breath sounds.   Musculoskeletal:      Cervical back: Neck supple.   Lymphadenopathy:      Cervical: No cervical adenopathy.   Neurological:      Mental Status: She is alert and oriented to person, place, and time.   Psychiatric:         Attention and Perception: She is attentive.         Behavior: Behavior normal.         Thought Content: Thought content normal.           Data Reviewed:  Recent Results (from the past 4 weeks)   Thyrotropin Receptor Antibody    Collection Time: 03/11/25 10:05 AM    Specimen: Blood   Result Value Ref Range    Thyrotropin Receptor Antibody <1.10 0.00 - 1.75 IU/L   Thyroid " Antibodies    Collection Time: 03/11/25 10:05 AM    Specimen: Blood   Result Value Ref Range    Thyroid Peroxidase Antibody 14 0 - 34 IU/mL    Thyroglobulin Ab <1.0 0.0 - 0.9 IU/mL   T4, Free    Collection Time: 03/11/25 10:05 AM    Specimen: Blood   Result Value Ref Range    Free T4 1.87 (H) 0.82 - 1.77 ng/dL   TSH    Collection Time: 03/11/25 10:05 AM    Specimen: Blood   Result Value Ref Range    TSH 1.570 0.450 - 4.500 uIU/mL   Lipid Panel With / Chol / HDL Ratio    Collection Time: 03/11/25 10:05 AM    Specimen: Blood   Result Value Ref Range    Total Cholesterol 202 (H) 100 - 199 mg/dL    Triglycerides 170 (H) 0 - 149 mg/dL    HDL Cholesterol 43 >39 mg/dL    VLDL Cholesterol Jean 30 5 - 40 mg/dL    LDL Chol Calc (NIH) 129 (H) 0 - 99 mg/dL    Chol/HDL Ratio 4.7 (H) 0.0 - 4.4 ratio   Hemoglobin A1c    Collection Time: 03/11/25 10:05 AM    Specimen: Blood   Result Value Ref Range    Hemoglobin A1C 5.5 4.8 - 5.6 %   Comprehensive Metabolic Panel    Collection Time: 03/11/25 10:05 AM    Specimen: Blood   Result Value Ref Range    Glucose 101 (H) 70 - 99 mg/dL    BUN 28 (H) 8 - 27 mg/dL    Creatinine 1.85 (H) 0.57 - 1.00 mg/dL    EGFR Result 30 (L) >59 mL/min/1.73    BUN/Creatinine Ratio 15 12 - 28    Sodium 139 134 - 144 mmol/L    Potassium 4.7 3.5 - 5.2 mmol/L    Chloride 100 96 - 106 mmol/L    Total CO2 21 20 - 29 mmol/L    Calcium 10.3 8.7 - 10.3 mg/dL    Total Protein 7.5 6.0 - 8.5 g/dL    Albumin 4.5 3.9 - 4.9 g/dL    Globulin 3.0 1.5 - 4.5 g/dL    Total Bilirubin 0.5 0.0 - 1.2 mg/dL    Alkaline Phosphatase 63 44 - 121 IU/L    AST (SGOT) 22 0 - 40 IU/L    ALT (SGPT) 33 (H) 0 - 32 IU/L             Assessment & Plan  Primary hypertension  - continue current regimen         Mixed hyperlipidemia  - continue current regimen         Type 2 diabetes mellitus without complication, without long-term current use of insulin  - working hard on diet  - has done very well w/ intentional wgt loss  - stop Metformin          Stage 3b chronic kidney disease  - stop Metformin  - refer for Nephro consult    Orders:    Ambulatory Referral to Nephrology    Goiter         Left thyroid nodule  - advised to reschedule thyroid US             Medications, including side effects, were discussed with the patient. Patient verbalized understanding.  The plan of care was discussed. All questions were answered. Patient verbalized understanding.      Return in about 3 months (around 6/18/2025) for fasting labs one week prior to.

## 2025-04-02 RX ORDER — RIVAROXABAN 20 MG/1
20 TABLET, FILM COATED ORAL DAILY
Qty: 90 TABLET | Refills: 0 | Status: SHIPPED | OUTPATIENT
Start: 2025-04-02

## 2025-04-30 ENCOUNTER — TRANSCRIBE ORDERS (OUTPATIENT)
Dept: ADMINISTRATIVE | Facility: HOSPITAL | Age: 65
End: 2025-04-30
Payer: COMMERCIAL

## 2025-04-30 DIAGNOSIS — N18.32 CKD STAGE 3B, GFR 30-44 ML/MIN: Primary | ICD-10-CM

## 2025-05-13 ENCOUNTER — HOSPITAL ENCOUNTER (OUTPATIENT)
Dept: ULTRASOUND IMAGING | Facility: HOSPITAL | Age: 65
Discharge: HOME | End: 2025-05-13
Payer: COMMERCIAL

## 2025-05-13 DIAGNOSIS — N18.32 CKD STAGE 3B, GFR 30-44 ML/MIN: ICD-10-CM

## 2025-05-13 PROCEDURE — 76536 US EXAM OF HEAD AND NECK: CPT

## 2025-05-13 PROCEDURE — 76775 US EXAM ABDO BACK WALL LIM: CPT

## 2025-05-21 RX ORDER — DILTIAZEM HYDROCHLORIDE 240 MG/1
240 CAPSULE, COATED, EXTENDED RELEASE ORAL NIGHTLY
Qty: 90 CAPSULE | Refills: 0 | Status: SHIPPED | OUTPATIENT
Start: 2025-05-21

## 2025-06-02 RX ORDER — PRAVASTATIN SODIUM 20 MG
20 TABLET ORAL
Qty: 90 TABLET | Refills: 3 | Status: SHIPPED | OUTPATIENT
Start: 2025-06-02

## 2025-06-02 NOTE — TELEPHONE ENCOUNTER
Refill Protocol Failed, Requesting Pravastatin    LOV w/ you on 4/23/24   FU w/ RM on 7/25/25  Last Labs- 3/11/25    Please review.    Nicole CASTRO, CMA

## 2025-06-12 DIAGNOSIS — N18.32 STAGE 3B CHRONIC KIDNEY DISEASE: ICD-10-CM

## 2025-06-12 DIAGNOSIS — E11.9 TYPE 2 DIABETES MELLITUS WITHOUT COMPLICATION, WITHOUT LONG-TERM CURRENT USE OF INSULIN: ICD-10-CM

## 2025-06-12 DIAGNOSIS — E78.2 MIXED HYPERLIPIDEMIA: ICD-10-CM

## 2025-06-12 DIAGNOSIS — I10 PRIMARY HYPERTENSION: ICD-10-CM

## 2025-06-18 RX ORDER — RIVAROXABAN 20 MG/1
20 TABLET, FILM COATED ORAL DAILY
Qty: 90 TABLET | Refills: 3 | Status: SHIPPED | OUTPATIENT
Start: 2025-06-18

## 2025-06-18 NOTE — TELEPHONE ENCOUNTER
Refill Protocol Failed, Requesting Xarelto    LOV w/ NM on 4/23/24   FU w/ RM on 7/25/25  Last Labs- 3/11/25    Please review.    Nicole CASTRO, CMA

## 2025-06-23 ENCOUNTER — LAB (OUTPATIENT)
Dept: LAB | Facility: HOSPITAL | Age: 65
End: 2025-06-23
Payer: COMMERCIAL

## 2025-06-23 ENCOUNTER — TRANSCRIBE ORDERS (OUTPATIENT)
Dept: ADMINISTRATIVE | Facility: HOSPITAL | Age: 65
End: 2025-06-23
Payer: COMMERCIAL

## 2025-06-23 DIAGNOSIS — E78.5 HYPERLIPIDEMIA, UNSPECIFIED HYPERLIPIDEMIA TYPE: ICD-10-CM

## 2025-06-23 DIAGNOSIS — N18.32 CHRONIC KIDNEY DISEASE (CKD) STAGE G3B/A1, MODERATELY DECREASED GLOMERULAR FILTRATION RATE (GFR) BETWEEN 30-44 ML/MIN/1.73 SQUARE METER AND ALBUMINURIA CREATININE RATIO LESS THAN 30 MG/G (CMS/H*: ICD-10-CM

## 2025-06-23 DIAGNOSIS — N18.32 CHRONIC KIDNEY DISEASE (CKD) STAGE G3B/A1, MODERATELY DECREASED GLOMERULAR FILTRATION RATE (GFR) BETWEEN 30-44 ML/MIN/1.73 SQUARE METER AND ALBUMINURIA CREATININE RATIO LESS THAN 30 MG/G (CMS/H*: Primary | ICD-10-CM

## 2025-06-23 LAB
ALBUMIN SERPL-MCNC: 4.1 G/DL (ref 3.5–5.2)
ALBUMIN UR-MCNC: 4.7 MG/DL
ANION GAP SERPL CALCULATED.3IONS-SCNC: 9 MMOL/L (ref 5–15)
BACTERIA UR QL AUTO: ABNORMAL /HPF
BILIRUB UR QL STRIP: NEGATIVE
BUN SERPL-MCNC: 19 MG/DL (ref 8–23)
BUN/CREAT SERPL: 12.1 (ref 7–25)
CALCIUM SPEC-SCNC: 9.8 MG/DL (ref 8.6–10.5)
CHLORIDE SERPL-SCNC: 104 MMOL/L (ref 98–107)
CLARITY UR: CLEAR
CO2 SERPL-SCNC: 26 MMOL/L (ref 22–29)
COLOR UR: YELLOW
CREAT SERPL-MCNC: 1.57 MG/DL (ref 0.57–1)
CREAT UR-MCNC: 129.9 MG/DL
EGFRCR SERPLBLD CKD-EPI 2021: 36.7 ML/MIN/1.73
GLUCOSE SERPL-MCNC: 83 MG/DL (ref 65–99)
GLUCOSE UR STRIP-MCNC: NEGATIVE MG/DL
HGB UR QL STRIP.AUTO: NEGATIVE
HYALINE CASTS UR QL AUTO: ABNORMAL /LPF
KETONES UR QL STRIP: NEGATIVE
LEUKOCYTE ESTERASE UR QL STRIP.AUTO: ABNORMAL
MICROALBUMIN/CREAT UR: 36.2 MG/G (ref 0–29)
NITRITE UR QL STRIP: NEGATIVE
PH UR STRIP.AUTO: 7.5 [PH] (ref 5–8)
PHOSPHATE SERPL-MCNC: 3.9 MG/DL (ref 2.5–4.5)
POTASSIUM SERPL-SCNC: 4.5 MMOL/L (ref 3.5–5.2)
PROT SERPL-MCNC: 7.3 G/DL (ref 6–8.5)
PROT UR QL STRIP: ABNORMAL
RBC # UR STRIP: ABNORMAL /HPF
REF LAB TEST METHOD: ABNORMAL
SODIUM SERPL-SCNC: 139 MMOL/L (ref 136–145)
SP GR UR STRIP: 1.02 (ref 1–1.03)
SQUAMOUS #/AREA URNS HPF: ABNORMAL /HPF
UROBILINOGEN UR QL STRIP: ABNORMAL
WBC # UR STRIP: ABNORMAL /HPF

## 2025-06-23 PROCEDURE — 84155 ASSAY OF PROTEIN SERUM: CPT

## 2025-06-23 PROCEDURE — 81001 URINALYSIS AUTO W/SCOPE: CPT

## 2025-06-23 PROCEDURE — 82043 UR ALBUMIN QUANTITATIVE: CPT

## 2025-06-23 PROCEDURE — 82570 ASSAY OF URINE CREATININE: CPT

## 2025-06-23 PROCEDURE — 80069 RENAL FUNCTION PANEL: CPT

## 2025-06-23 PROCEDURE — 36415 COLL VENOUS BLD VENIPUNCTURE: CPT

## 2025-06-27 ENCOUNTER — TELEPHONE (OUTPATIENT)
Dept: INTERNAL MEDICINE | Facility: CLINIC | Age: 65
End: 2025-06-27

## 2025-06-27 NOTE — TELEPHONE ENCOUNTER
Caller: Claudia Gage    Relationship: Self    Best call back number: [phone]      What was the call regarding: PATIENT TOOK LAB ORDERS DOWNSTAIRS ON 6/23/25 TO HAVE LABS DRAWN FOR ELPIDIO TSAI     PATIENT DOESN'T THINK THOSE LABS WERE DRAWN DUE TO SHE HAD LAB ORDERS FROM DR CHARLES AND THOSE LABS HAVE ALREADY RESULTED     PATIENT STATES SHE COMES IN OFFICE MONDAY 6/30/25 BUT DOESN'T HAVE THE RESULTS FROM THE LABS THAT [facility]SAI WANTED DRAWN     SO QUESTION PATIENT IS WANTING TO KNOW IF SHE NEEDS TO COME IN OR HAVE THE LABS DRAW AND RESCHEDULED 6/30/25 APPOINTMENT

## 2025-07-22 LAB
ALBUMIN SERPL-MCNC: 4.2 G/DL (ref 3.5–5.2)
ALBUMIN/GLOB SERPL: 1.5 G/DL
ALP SERPL-CCNC: 56 U/L (ref 39–117)
ALT SERPL-CCNC: 23 U/L (ref 1–33)
AST SERPL-CCNC: 25 U/L (ref 1–32)
BILIRUB SERPL-MCNC: 0.5 MG/DL (ref 0–1.2)
BUN SERPL-MCNC: 20 MG/DL (ref 8–23)
BUN/CREAT SERPL: 12.7 (ref 7–25)
CALCIUM SERPL-MCNC: 10 MG/DL (ref 8.6–10.5)
CHLORIDE SERPL-SCNC: 106 MMOL/L (ref 98–107)
CHOLEST SERPL-MCNC: 154 MG/DL (ref 0–200)
CHOLEST/HDLC SERPL: 2.85 {RATIO}
CO2 SERPL-SCNC: 27.3 MMOL/L (ref 22–29)
CREAT SERPL-MCNC: 1.58 MG/DL (ref 0.57–1)
EGFRCR SERPLBLD CKD-EPI 2021: 36.4 ML/MIN/1.73
GLOBULIN SER CALC-MCNC: 2.8 GM/DL
GLUCOSE SERPL-MCNC: 107 MG/DL (ref 65–99)
HBA1C MFR BLD: 5.7 % (ref 4.8–5.6)
HDLC SERPL-MCNC: 54 MG/DL (ref 40–60)
LDLC SERPL CALC-MCNC: 84 MG/DL (ref 0–100)
POTASSIUM SERPL-SCNC: 5.5 MMOL/L (ref 3.5–5.2)
PROT SERPL-MCNC: 7 G/DL (ref 6–8.5)
SODIUM SERPL-SCNC: 143 MMOL/L (ref 136–145)
TRIGL SERPL-MCNC: 86 MG/DL (ref 0–150)
VLDLC SERPL CALC-MCNC: 16 MG/DL (ref 5–40)

## 2025-07-29 ENCOUNTER — OFFICE VISIT (OUTPATIENT)
Dept: INTERNAL MEDICINE | Facility: CLINIC | Age: 65
End: 2025-07-29
Payer: COMMERCIAL

## 2025-07-29 VITALS
HEART RATE: 48 BPM | BODY MASS INDEX: 26.71 KG/M2 | OXYGEN SATURATION: 98 % | DIASTOLIC BLOOD PRESSURE: 80 MMHG | SYSTOLIC BLOOD PRESSURE: 130 MMHG | TEMPERATURE: 98 F | HEIGHT: 67 IN | WEIGHT: 170.2 LBS

## 2025-07-29 DIAGNOSIS — E78.2 MIXED HYPERLIPIDEMIA: ICD-10-CM

## 2025-07-29 DIAGNOSIS — E04.1 LEFT THYROID NODULE: ICD-10-CM

## 2025-07-29 DIAGNOSIS — E11.9 TYPE 2 DIABETES MELLITUS WITHOUT COMPLICATION, WITHOUT LONG-TERM CURRENT USE OF INSULIN: ICD-10-CM

## 2025-07-29 DIAGNOSIS — N18.32 STAGE 3B CHRONIC KIDNEY DISEASE: ICD-10-CM

## 2025-07-29 DIAGNOSIS — I10 PRIMARY HYPERTENSION: Primary | ICD-10-CM

## 2025-07-29 DIAGNOSIS — I48.11 LONGSTANDING PERSISTENT ATRIAL FIBRILLATION: ICD-10-CM

## 2025-07-29 PROCEDURE — 99214 OFFICE O/P EST MOD 30 MIN: CPT | Performed by: NURSE PRACTITIONER

## 2025-07-29 NOTE — PROGRESS NOTES
Chief Complaint   Patient presents with    Diabetes     3 month follow up       SUBJECTIVE  Claudia Gage is a 64 y.o. female presenting today for follow up of her chronic health conditions.      These include afib, HTN, HLD, DM, CKD3B, and thyroid nodules.      Outpatient Medications Marked as Taking for the 7/29/25 encounter (Office Visit) with Darlin Kirkland APRN   Medication Sig Dispense Refill    amiodarone (PACERONE) 200 MG tablet TAKE 1 TABLET BY MOUTH DAILY 90 tablet 3    clobetasol (TEMOVATE) 0.05 % cream Apply  topically to the appropriate area as directed 2 (Two) Times a Day. 60 g 1    dilTIAZem CD (CARDIZEM CD) 240 MG 24 hr capsule TAKE 1 CAPSULE BY MOUTH AT NIGHT 90 capsule 0    Evolocumab (REPATHA) solution prefilled syringe injection Inject 1 mL under the skin into the appropriate area as directed Every 14 (Fourteen) Days. 6 mL 1    ezetimibe (ZETIA) 10 MG tablet TAKE 1 TABLET BY MOUTH DAILY 90 tablet 3    fexofenadine (ALLEGRA) 60 MG tablet Take 1 tablet by mouth Daily.      fluticasone (FLONASE) 50 MCG/ACT nasal spray 2 sprays into each nostril Daily. 1 each 11    metoprolol succinate XL (TOPROL-XL) 200 MG 24 hr tablet TAKE 1 TABLET BY MOUTH EVERY  NIGHT AT BEDTIME 90 tablet 3    ondansetron (Zofran) 8 MG tablet Take 1 tablet by mouth Every 8 (Eight) Hours As Needed for Nausea or Vomiting. 20 tablet 0    Peppermint Oil 90 MG capsule controlled-release Take 1 capsule by mouth 2 (Two) Times a Day. 180 capsule 3    pravastatin (PRAVACHOL) 20 MG tablet TAKE 1 TABLET BY MOUTH AT NIGHT 90 tablet 3    Probiotic Product (PROBIOTIC-10 PO) Take  by mouth.      valsartan (DIOVAN) 320 MG tablet TAKE 1 TABLET BY MOUTH DAILY 90 tablet 3    vitamin C (ASCORBIC ACID) 250 MG tablet Take 1 tablet by mouth Daily.      vitamin D (ERGOCALCIFEROL) 1.25 MG (33271 UT) capsule capsule Take 1 capsule by mouth Every 7 (Seven) Days. 13 capsule 3    Xarelto 20 MG tablet TAKE 1 TABLET BY MOUTH DAILY 90 tablet 3    Zinc  "50 MG tablet Take  by mouth.           The following portions of the patient's history were reviewed and updated as appropriate: allergies, current medications, past family history, past medical history, past social history, past surgical history and problem list.    Review of Systems   Respiratory:  Negative for shortness of breath.    Cardiovascular:  Negative for chest pain.   Neurological:  Negative for dizziness and headache.       Objective   Vitals:    07/29/25 0945 07/29/25 1003   BP: 142/80 130/80   BP Location: Left arm    Patient Position: Sitting    Cuff Size: Adult    Pulse: (!) 48    Temp: 98 °F (36.7 °C)    TempSrc: Infrared    SpO2: 98%    Weight: 77.2 kg (170 lb 3.2 oz)    Height: 170.2 cm (67\")        BP Readings from Last 3 Encounters:   07/29/25 130/80   03/18/25 132/72   12/09/24 138/74        Wt Readings from Last 3 Encounters:   07/29/25 77.2 kg (170 lb 3.2 oz)   03/18/25 81.2 kg (179 lb)   12/09/24 93.3 kg (205 lb 9.6 oz)        Body mass index is 26.66 kg/m².  Nursing notes and vitals reviewed.    Physical Exam  Constitutional:       General: She is not in acute distress.     Appearance: She is well-developed.   Neck:      Thyroid: Thyromegaly present. No thyroid mass or thyroid tenderness.   Cardiovascular:      Rate and Rhythm: Rhythm irregular.      Heart sounds: S1 normal and S2 normal.   Pulmonary:      Effort: Pulmonary effort is normal.      Breath sounds: Normal breath sounds.   Musculoskeletal:      Cervical back: Neck supple.      Right lower leg: No edema.      Left lower leg: No edema.   Lymphadenopathy:      Cervical: No cervical adenopathy.   Neurological:      Mental Status: She is alert and oriented to person, place, and time.   Psychiatric:         Attention and Perception: She is attentive.         Behavior: Behavior normal.         Thought Content: Thought content normal.           Data Reviewed:  Recent Results (from the past 4 weeks)   Lipid Panel With / Chol / HDL Ratio "    Collection Time: 07/22/25  9:54 AM    Specimen: Blood   Result Value Ref Range    Total Cholesterol 154 0 - 200 mg/dL    Triglycerides 86 0 - 150 mg/dL    HDL Cholesterol 54 40 - 60 mg/dL    VLDL Cholesterol Jean 16 5 - 40 mg/dL    LDL Chol Calc (NIH) 84 0 - 100 mg/dL    Chol/HDL Ratio 2.85    Hemoglobin A1c    Collection Time: 07/22/25  9:54 AM    Specimen: Blood   Result Value Ref Range    Hemoglobin A1C 5.70 (H) 4.80 - 5.60 %   Comprehensive Metabolic Panel    Collection Time: 07/22/25  9:54 AM    Specimen: Blood   Result Value Ref Range    Glucose 107 (H) 65 - 99 mg/dL    BUN 20.0 8.0 - 23.0 mg/dL    Creatinine 1.58 (H) 0.57 - 1.00 mg/dL    EGFR Result 36.4 (L) >60.0 mL/min/1.73    BUN/Creatinine Ratio 12.7 7.0 - 25.0    Sodium 143 136 - 145 mmol/L    Potassium 5.5 (H) 3.5 - 5.2 mmol/L    Chloride 106 98 - 107 mmol/L    Total CO2 27.3 22.0 - 29.0 mmol/L    Calcium 10.0 8.6 - 10.5 mg/dL    Total Protein 7.0 6.0 - 8.5 g/dL    Albumin 4.2 3.5 - 5.2 g/dL    Globulin 2.8 gm/dL    A/G Ratio 1.5 g/dL    Total Bilirubin 0.5 0.0 - 1.2 mg/dL    Alkaline Phosphatase 56 39 - 117 U/L    AST (SGOT) 25 1 - 32 U/L    ALT (SGPT) 23 1 - 33 U/L       US Thyroid (05/13/2025 14:40)   Impression:     1. Multiple bilateral thyroid nodules consistent with changes of multinodular goiter.Within the mid left lobe there is a 1 cm TI-RADS 4 nodule which should be followed in 12 months with repeat ultrasound.     TI-RADS: TR4 - Size between 1 cm and 1.5 cm. Recommend follow up thyroid ultrasound at years 1, 2, 3 and 5 of surveillance.       Nephrology notes dated 04/28/2025, 06/30/2025.      Assessment and Plan:       Primary hypertension  - continue current regimen         Mixed hyperlipidemia  - continue current regimen         Longstanding persistent atrial fibrillation  - on DOCA  - rate controlled       Type 2 diabetes mellitus without complication, without long-term current use of insulin  - currently controlled w/ diet alone          Left thyroid nodule  - Us in one year       Stage 3b chronic kidney disease  - stable  - following w/ Nephrology             Medications, including side effects, were discussed with the patient. Patient verbalized understanding.  The plan of care was discussed. All questions were answered. Patient verbalized understanding.      Return in about 3 months (around 10/29/2025) for fasting labs one week prior to, Annual physical.

## 2025-08-14 RX ORDER — DILTIAZEM HYDROCHLORIDE 240 MG/1
240 CAPSULE, EXTENDED RELEASE ORAL NIGHTLY
Qty: 90 CAPSULE | Refills: 3 | OUTPATIENT
Start: 2025-08-14

## 2025-08-15 ENCOUNTER — OFFICE VISIT (OUTPATIENT)
Dept: CARDIOLOGY | Facility: CLINIC | Age: 65
End: 2025-08-15
Payer: COMMERCIAL

## 2025-08-15 VITALS
BODY MASS INDEX: 26.68 KG/M2 | WEIGHT: 170 LBS | HEIGHT: 67 IN | SYSTOLIC BLOOD PRESSURE: 110 MMHG | DIASTOLIC BLOOD PRESSURE: 64 MMHG

## 2025-08-15 DIAGNOSIS — G47.33 OBSTRUCTIVE SLEEP APNEA SYNDROME: ICD-10-CM

## 2025-08-15 DIAGNOSIS — E11.9 TYPE 2 DIABETES MELLITUS WITHOUT COMPLICATION, WITHOUT LONG-TERM CURRENT USE OF INSULIN: ICD-10-CM

## 2025-08-15 DIAGNOSIS — I48.0 PAF (PAROXYSMAL ATRIAL FIBRILLATION): Primary | ICD-10-CM

## 2025-08-15 DIAGNOSIS — N18.32 STAGE 3B CHRONIC KIDNEY DISEASE: ICD-10-CM

## 2025-08-15 DIAGNOSIS — E78.2 MIXED HYPERLIPIDEMIA: ICD-10-CM

## 2025-08-15 DIAGNOSIS — I10 PRIMARY HYPERTENSION: ICD-10-CM

## 2025-08-15 RX ORDER — AMIODARONE HYDROCHLORIDE 200 MG/1
100 TABLET ORAL DAILY
Qty: 45 TABLET | Refills: 3 | Status: SHIPPED | OUTPATIENT
Start: 2025-08-15

## 2025-08-29 RX ORDER — DILTIAZEM HYDROCHLORIDE 240 MG/1
240 CAPSULE, EXTENDED RELEASE ORAL NIGHTLY
Qty: 90 CAPSULE | Refills: 3 | OUTPATIENT
Start: 2025-08-29

## (undated) DEVICE — SYR LL 3CC

## (undated) DEVICE — SNAR POLYP SENSATION STDOVL 27 240 BX40

## (undated) DEVICE — VIAL FORMALIN CAP 10P 40ML

## (undated) DEVICE — SPNG GZ WOVN 4X4IN 12PLY 10/BX STRL

## (undated) DEVICE — BW-412T DISP COMBO CLEANING BRUSH: Brand: SINGLE USE COMBINATION CLEANING BRUSH

## (undated) DEVICE — THE SINGLE USE ETRAP – POLYP TRAP IS USED FOR SUCTION RETRIEVAL OF ENDOSCOPICALLY REMOVED POLYPS.: Brand: ETRAP

## (undated) DEVICE — KT ORCA ORCAPOD DISP STRL

## (undated) DEVICE — JACKT LAB F/R KNIT CUFF/COLR XLG BLU

## (undated) DEVICE — FRCP BX RADJAW4 NDL 2.8 240CM LG OG BX40

## (undated) DEVICE — SUCTION CANISTER, 1000CC,SAFELINER: Brand: DEROYAL

## (undated) DEVICE — GLV SURG SENSICARE PI MIC PF SZ7.5 LF STRL

## (undated) DEVICE — ADAPT CLN BIOGUARD AIR/H2O DISP

## (undated) DEVICE — Device